# Patient Record
Sex: FEMALE | Race: WHITE | NOT HISPANIC OR LATINO | Employment: UNEMPLOYED | URBAN - METROPOLITAN AREA
[De-identification: names, ages, dates, MRNs, and addresses within clinical notes are randomized per-mention and may not be internally consistent; named-entity substitution may affect disease eponyms.]

---

## 2020-09-12 ENCOUNTER — COMMUNICATION - HEALTHEAST (OUTPATIENT)
Dept: SCHEDULING | Facility: CLINIC | Age: 35
End: 2020-09-12

## 2020-09-15 ENCOUNTER — COMMUNICATION - HEALTHEAST (OUTPATIENT)
Dept: SCHEDULING | Facility: CLINIC | Age: 35
End: 2020-09-15

## 2020-09-24 ENCOUNTER — TRANSCRIBE ORDERS (OUTPATIENT)
Dept: OTHER | Age: 35
End: 2020-09-24

## 2020-09-29 ENCOUNTER — TELEPHONE (OUTPATIENT)
Dept: GASTROENTEROLOGY | Facility: CLINIC | Age: 35
End: 2020-09-29

## 2020-09-29 NOTE — TELEPHONE ENCOUNTER
[facility]     Referring provider:  Yohana Garcai     Referred to: [facility]     Provider Requested:  Russell     Referral Received: 9/29/2020     Records received: in Care Everywhere     Images received: none    Evaluation for: recurrent pancreatitis      Clinical History (per RN review):     Admission [facility] pancreatitis 9/10-9/14/2020 (lipase 1,451 9/10), ER visit 9/16 for abd pain  Fabiola Raymond is a 35 y.o. female with history of alcoholism and previous pancreatitis presented for evaluation of abdominal pain and nausea found to have pancreatitis and elevated LFTs     #Acute alcoholic pancreatitis  Bile ducts looked okay on CT scan and ultrasound.   Treated conservatively with n.p.o. and IV fluid. Symptomatically much better and now tolerating lowfat diet and on oral pain meds.  Advised alcohol cessation     #Probable alcoholic hepatitis   #Fatty liver   Significant transaminitis with conjugated and unconjugated hyperbilirubinemia.  Suspect this is probably alcoholic hepatitis.  As above bile ducts looked okay on imaging.  She does have a fatty liver.  She may be drinking more than she discloses.  Labs have been improving since admission. Needs to stop drinking and I told her this.       Relapsed on alcohol. Hospitalized at [facility] on 6/14/2020- 6/18/2020 due to alcohol induced pancreatitis:  Acute pancreatitis with moderate peripancreatic inflammation and fluid. No   organized fluid collections/pseudocysts, parenchymal necrosis, or other   complication.      CT CAP 9/10/2020    IMPRESSION:     1.  Diffuse peripancreatic inflammatory stranding consistent with acute pancreatitis. No findings to suggest necrotizing pancreatitis or peripancreatic fluid collection.  2.  Severe hepatic steatosis.        MD review date: 9/29/2020  MD Decision for clinic consultation/Orders:            Referral updates/Patient contacted:

## 2020-09-29 NOTE — TELEPHONE ENCOUNTER
[facility] Intake form:    Referring/Requesting provider and Health care System: Yohana Garcia to Dr. Wells in [facility]  Clinic contact - Name, Phone and Fax number:  [phone]  Fax [phone]    Requested provider (if specified):     Has patient been evaluated in clinic or had a procedure [facility] provider in the last 5 years: No      Indication/Diagnosis for consultation: Recurring Pancreatitis    Has patient been evaluated by another Gastroenterologist? Unknown     Imaging completed:     CT scan   No   MRI   No      Procedures:     Upper Endoscopy/EGD unknown     Endoscopic Ultrasound/EUS unknown    ERCP  No    Colonoscopy No      Are images able/being pushed to our system? Yes       Is patient aware of request for clinc consultation and ok to be contacted to schedule? Yes

## 2020-09-29 NOTE — LETTER
October 8, 2020    Fabiola Raymond                              [address]  [place] [address]    Dear Fabiola,      Our office recently received a referral from Yohana Garcia  related to your healthcare. We have reviewed your records and imaging and have suggestions on how we would proceed. We have made several attempts to contact you and have not heard back. Your referral will be closed at this time and we will not make further attempts to contact you. If you wish to follow up on this referral please contact our office at [phone].     Sabra Jones RN Care Coordinator

## 2020-10-06 NOTE — TELEPHONE ENCOUNTER
Per Dr. Wells  Clinic     Called to schedule new patient virtual visit with Russell, left message.    ML

## 2020-10-08 NOTE — TELEPHONE ENCOUNTER
Called to invite patient to clinic with Dr. Wells. Left message, letter sent, referral closed.    ML

## 2020-10-21 ENCOUNTER — COMMUNICATION - HEALTHEAST (OUTPATIENT)
Dept: SCHEDULING | Facility: CLINIC | Age: 35
End: 2020-10-21

## 2021-06-02 ENCOUNTER — RECORDS - HEALTHEAST (OUTPATIENT)
Dept: ADMINISTRATIVE | Facility: CLINIC | Age: 36
End: 2021-06-02

## 2021-06-11 NOTE — TELEPHONE ENCOUNTER
Patient calling reporting she was seen for pancreatitis yesterday. States she is having upper abdominal pain and rates it at 6/10. States the pain is constant. Reports feeling weak also. Pain radiate from the upper abdomen to upper back. Vomited three time. Per guideline, advised patient to be seen at the emergency department. Verbalized understanding and will go [facility].     Charles Nassar RN  [facility] Nurse Advisors     COVID 19 Nurse Triage Plan/Patient Instructions    Please be aware that novel coronavirus (COVID-19) may be circulating in the community. If you develop symptoms such as fever, cough, or SOB or if you have concerns about the presence of another infection including coronavirus (COVID-19), please contact your health care provider or visit [email or web address].     Disposition/Instructions    ED Visit recommended. Follow protocol based instructions.      Bring Your Own Device:  Please also bring your smart device(s) (smart phones, tablets, laptops) and their charging cables for your personal use and to communicate with your care team during your visit.      Thank you for taking steps to prevent the spread of this virus.  o Limit your contact with others.  o Wear a simple mask to cover your cough.  o Wash your hands well and often.    Resources    [facility]: About COVID-19: [email or web address]    CDC: What to Do If You're Sick: www.cdc.gov/coronavirus/2019-ncov/about/steps-when-sick.html    CDC: Ending Home Isolation: www.cdc.gov/coronavirus/2019-ncov/hcp/disposition-in-home-patients.html     CDC: Caring for Someone: www.cdc.gov/coronavirus/2019-ncov/if-you-are-sick/care-for-someone.html     [facility]: Interim Guidance for Hospital Discharge to Home: www.health.[facility].[place]/diseases/coronavirus/hcp/hospdischarge.pdf    [facility] clinical trials (COVID-19 research studies): clinicalaffairs.[facility]/umn-clinical-trials     Below are the COVID-19 hotlines at the  "[place] Department of [facility] ([facility]). Interpreters are available.   o For health questions: Call 890-801-5406 or 1-272.906.2951 (7 a.m. to 7 p.m.)  o For questions about schools and childcare: Call 496-138-8128 or 1-871.482.9942 (7 a.m. to 7 p.m.)       Reason for Disposition    Patient sounds very sick or weak to the triager    Additional Information    Negative: Severe difficulty breathing (e.g., struggling for each breath, speaks in single words)    Negative: Shock suspected (e.g., cold/pale/clammy skin, too weak to stand, low BP, rapid pulse)    Negative: Difficult to awaken or acting confused (e.g., disoriented, slurred speech)    Negative: Passed out (i.e., lost consciousness, collapsed and was not responding)    Negative: Visible sweat on face or sweat dripping down face    Negative: Sounds like a life-threatening emergency to the triager    Negative: [1] SEVERE pain (e.g., excruciating) AND [2] present > 1 hour    Negative: [1] Pain lasts > 10 minutes AND [2] age > 50    Negative: [1] Pain lasts > 10 minutes AND [2] age > 40 AND [3] associated chest, arm, neck, upper back or jaw pain    Negative: [1] Pain lasts > 10 minutes AND [2] age > 35 AND [3] at least one cardiac risk factor (i.e., hypertension, diabetes, obesity, smoker or strong family history of heart disease)    Negative: [1] Pain lasts > 10 minutes AND [2] history of heart disease (i.e., heart attack, bypass surgery, angina, angioplasty, CHF; not just a heart murmur)    Negative: [1] Pain lasts > 10 minutes AND [2] difficulty breathing    Negative: [1] Vomiting AND [2] contains red blood  (Exception: few streaks and only occurred once)    Negative: [1] Vomiting AND [2] contains black (\"coffee ground\") material    Negative: Blood in bowel movements  (Exception: Blood on surface of BM with constipation)    Negative: Black or tarry bowel movements  (Exception: chronic-unchanged  black-grey bowel movements AND is taking iron pills or Pepto-bismol)    " Negative: [1] Pregnant > 24 weeks AND [2] hand or face swelling    Protocols used: ABDOMINAL PAIN - UPPER-A-AH

## 2021-06-16 PROBLEM — K85.20 ALCOHOL-INDUCED ACUTE PANCREATITIS, UNSPECIFIED COMPLICATION STATUS: Status: ACTIVE | Noted: 2020-09-10

## 2021-07-03 ENCOUNTER — HOSPITAL ENCOUNTER (EMERGENCY)
Dept: EMERGENCY MEDICINE | Facility: HOSPITAL | Age: 36
Discharge: HOME | End: 2021-07-03
Attending: EMERGENCY MEDICINE

## 2021-07-03 DIAGNOSIS — K85.90 ACUTE PANCREATITIS, UNSPECIFIED COMPLICATION STATUS, UNSPECIFIED PANCREATITIS TYPE: ICD-10-CM

## 2021-07-03 DIAGNOSIS — G43.909 MIGRAINE WITHOUT STATUS MIGRAINOSUS, NOT INTRACTABLE, UNSPECIFIED MIGRAINE TYPE: ICD-10-CM

## 2021-07-03 DIAGNOSIS — K70.10 ALCOHOLIC HEPATITIS, UNSPECIFIED WHETHER ASCITES PRESENT (H): ICD-10-CM

## 2021-07-03 LAB
ALBUMIN SERPL-MCNC: 4.5 G/DL (ref 3.5–5)
ALBUMIN UR-MCNC: NEGATIVE G/DL
ALP SERPL-CCNC: 78 U/L (ref 45–120)
ALT SERPL W P-5'-P-CCNC: 92 U/L (ref 0–45)
ANION GAP SERPL CALCULATED.3IONS-SCNC: 21 MMOL/L (ref 5–18)
APPEARANCE UR: CLEAR
AST SERPL W P-5'-P-CCNC: 158 U/L (ref 0–40)
BASOPHILS # BLD AUTO: 0 THOU/UL (ref 0–0.2)
BASOPHILS NFR BLD AUTO: 1 % (ref 0–2)
BILIRUB SERPL-MCNC: 0.8 MG/DL (ref 0–1)
BILIRUB UR QL STRIP: NEGATIVE
BUN SERPL-MCNC: 9 MG/DL (ref 8–22)
C REACTIVE PROTEIN LHE: 0.2 MG/DL (ref 0–0.8)
CALCIUM SERPL-MCNC: 8.9 MG/DL (ref 8.5–10.5)
CHLORIDE BLD-SCNC: 99 MMOL/L (ref 98–107)
CO2 SERPL-SCNC: 18 MMOL/L (ref 22–31)
COLOR UR AUTO: ABNORMAL
CREAT SERPL-MCNC: 0.69 MG/DL (ref 0.6–1.1)
EOSINOPHIL # BLD AUTO: 0 THOU/UL (ref 0–0.4)
EOSINOPHIL NFR BLD AUTO: 0 % (ref 0–6)
ERYTHROCYTE [DISTWIDTH] IN BLOOD BY AUTOMATED COUNT: 13.7 % (ref 11–14.5)
GFR SERPL CREATININE-BSD FRML MDRD: >60 ML/MIN/1.73M2
GLUCOSE BLD-MCNC: 68 MG/DL (ref 70–125)
GLUCOSE UR STRIP-MCNC: NEGATIVE MG/DL
HCG SERPL QL: NEGATIVE
HCT VFR BLD AUTO: 40.6 % (ref 35–47)
HGB BLD-MCNC: 14.2 G/DL (ref 12–16)
HGB UR QL STRIP: ABNORMAL
HYALINE CASTS: 4 LPF
IMM GRANULOCYTES # BLD: 0 THOU/UL
IMM GRANULOCYTES NFR BLD: 0 %
KETONES UR STRIP-MCNC: ABNORMAL MG/DL
LEUKOCYTE ESTERASE UR QL STRIP: NEGATIVE
LIPASE SERPL-CCNC: 194 U/L (ref 0–52)
LYMPHOCYTES # BLD AUTO: 1 THOU/UL (ref 0.8–4.4)
LYMPHOCYTES NFR BLD AUTO: 24 % (ref 20–40)
MAGNESIUM SERPL-MCNC: 2 MG/DL (ref 1.8–2.6)
MCH RBC QN AUTO: 33.1 PG (ref 27–34)
MCHC RBC AUTO-ENTMCNC: 35 G/DL (ref 32–36)
MCV RBC AUTO: 95 FL (ref 80–100)
MONOCYTES # BLD AUTO: 0.2 THOU/UL (ref 0–0.9)
MONOCYTES NFR BLD AUTO: 6 % (ref 2–10)
MUCOUS THREADS #/AREA URNS LPF: PRESENT LPF
NEUTROPHILS # BLD AUTO: 2.9 THOU/UL (ref 2–7.7)
NEUTROPHILS NFR BLD AUTO: 70 % (ref 50–70)
NITRATE UR QL: POSITIVE
PH UR STRIP: 5.5 [PH] (ref 5–8)
PLATELET # BLD AUTO: 151 THOU/UL (ref 140–440)
PMV BLD AUTO: 9.5 FL (ref 8.5–12.5)
POTASSIUM BLD-SCNC: 4.4 MMOL/L (ref 3.5–5)
PROT SERPL-MCNC: 8.1 G/DL (ref 6–8)
RBC # BLD AUTO: 4.29 MILL/UL (ref 3.8–5.4)
RBC URINE: 1 HPF
SODIUM SERPL-SCNC: 138 MMOL/L (ref 136–145)
SP GR UR STRIP: 1.01 (ref 1–1.03)
SQUAMOUS EPITHELIAL: 2 /HPF
UROBILINOGEN UR STRIP-ACNC: ABNORMAL
WBC URINE: 2 HPF
WBC: 4.2 THOU/UL (ref 4–11)

## 2021-07-03 ASSESSMENT — MIFFLIN-ST. JEOR: SCORE: 1181.23

## 2021-07-04 NOTE — ED TRIAGE NOTES
ED Triage Notes by Wilfredo Douglas RN at 7/3/2021  4:45 PM     Author: Wilfredo Douglas RN Service: -- Author Type: Registered Nurse    Filed: 7/3/2021  4:48 PM Date of Service: 7/3/2021  4:45 PM Status: Signed    : Wilfredo Douglas RN (Registered Nurse)       She started to have lower abdominal pain and upper gastric pain last night.

## 2021-07-04 NOTE — ED NOTES
ED Notes by Patti Hassan, RN at 7/3/2021  8:43 PM     Author: Patti Hassan RN Service: -- Author Type: Registered Nurse    Filed: 7/3/2021  8:44 PM Date of Service: 7/3/2021  8:43 PM Status: Signed    : Patti Hassan RN (Registered Nurse)       Pt reports medication that was earlier admin did not help much and pain is now 7/10 for headache and stomach provider notified

## 2021-07-06 ENCOUNTER — COMMUNICATION - HEALTHEAST (OUTPATIENT)
Dept: EMERGENCY MEDICINE | Facility: CLINIC | Age: 36
End: 2021-07-06

## 2021-07-06 VITALS — BODY MASS INDEX: 20.2 KG/M2 | WEIGHT: 114 LBS | HEIGHT: 63 IN

## 2021-07-06 LAB — BACTERIA SPEC CULT: ABNORMAL

## 2021-07-06 NOTE — TELEPHONE ENCOUNTER
Telephone Encounter by Vanessa Mota PA-C at 7/6/2021  3:52 PM     Author: Vanessa Mota PA-C Service: -- Author Type: Physician Assistant    Filed: 7/6/2021  3:54 PM Encounter Date: 7/6/2021 Status: Signed    : Vanessa Mota PA-C (Physician Assistant)       Left nonspecific VM regarding test results to discuss. Call back number given

## 2021-07-08 NOTE — ED PROVIDER NOTES
ED Provider Notes by Sergey Berumen MD at 7/3/2021  4:53 PM     Author: Sergey Berumen MD Service: -- Author Type: Physician    Filed: 7/8/2021  6:04 AM Date of Service: 7/3/2021  4:53 PM Status: Signed    : Sergey Berumen MD (Physician)       EMERGENCY DEPARTMENT ENCOUNTER      NAME: Fabiola Barrera  AGE: 36 y.o. female  YOB: 1985  MRN: [id number]  EVALUATION DATE & TIME: 7/3/2021  4:52 PM    PCP: Yohana Garcia CNP    ED PROVIDER: Sergey Berumen M.D.      Chief Complaint   Patient presents with   ? Abdominal Pain         FINAL IMPRESSION:  1. Acute pancreatitis, unspecified complication status, unspecified pancreatitis type    2. Alcoholic hepatitis, unspecified whether ascites present    3. Migraine without status migrainosus, not intractable, unspecified migraine type          ED COURSE & MEDICAL DECISION MAKING:    Pertinent Labs & Imaging studies reviewed. (See chart for details)  36 y.o. female presents to the Emergency Department for evaluation of abdominal pain.  The patient has a history of alcohol abuse and migraine headaches.  She says that she has had some right-sided abdominal pain that is intermittent and stabbing in nature.  She rates her pain 7-10.  Additionally she has had some headache pains as well that she feels in the back of her head and these are accompanied by nausea.  She says that she has tried ibuprofen, Diet Coke and Maxalt for them which typically work but she has not received any relief from them today.  She also says that she has infrequent menstrual periods since having a IUD placed 4 years ago.  but she did notice some bleeding about 2 weeks ago and also last night which would be unusual for her.  On exam she was in mild distress and did have some abdominal tenderness.  The patient's lipase was noted to be elevated and the pancreatitis is very likely explain her abdominal pain.  We did also check a urine but there was no significant sign of  infection in the urine.  Her pregnancy test was negative.  She was noted to have elevated LFTs which she has had before and are likely due to the alcohol use.  She did not have any tenderness over the gallbladder or right upper quadrant.  I do not think that the elevated lipase and elevated transaminases are due to a choledocholithiasis or cholecystitis.  She had a normal white blood cell count, CRP and a normal T bili.  The patient was comfortable after beginning the fluids, antiemetics and pain medications in the ER.  She will be discharged home with prescriptions for the pain medications and antiemetics to use at home.  We did discuss treatment of the pancreatitis at home and she is aware that if she gets worse that she needs to return as it may be necessary to admit her for IV fluids and n.p.o. status until it resolves.  She is aware that she needs to stop drinking alcohol or this may worsen the pancreatitis.    5:31 PM I met the patient and performed my initial interview and exam.   7:03 PM I rechecked and updated the patient.     At the conclusion of the encounter I discussed the results of all of the tests and the disposition. The questions were answered. The patient or family acknowledged understanding and was agreeable with the care plan.       MEDICATIONS GIVEN IN THE EMERGENCY:  Medications   sodium chloride flush 10 mL (NS) (has no administration in time range)   sodium chloride 0.9% 1,000 mL (0 mL Intravenous Stopped 7/3/21 1910)   ketorolac injection 30 mg (TORADOL) (30 mg Intravenous Given 7/3/21 1750)   diphenhydrAMINE injection 25 mg (BENADRYL) (25 mg Intravenous Given 7/3/21 1749)   prochlorperazine injection 10 mg (COMPAZINE) (10 mg Intravenous Given 7/3/21 1755)   HYDROmorphone injection 1 mg (DILAUDID) (1 mg Intravenous Given 7/3/21 2053)       NEW PRESCRIPTIONS STARTED AT TODAY'S ER VISIT  Current Discharge Medication List      START taking these medications    Details   prochlorperazine  (COMPAZINE) 5 MG tablet 1-2 tabs po q8 hours as needed for nausea  Qty: 15 tablet, Refills: 0    Associated Diagnoses: Acute pancreatitis, unspecified complication status, unspecified pancreatitis type; Migraine without status migrainosus, not intractable, unspecified migraine type         CONTINUE these medications which have CHANGED    Details   HYDROmorphone (DILAUDID) 2 MG tablet Take 1 tablet (2 mg total) by mouth every 4 (four) hours as needed for pain.  Qty: 10 tablet, Refills: 0    Associated Diagnoses: Acute pancreatitis, unspecified complication status, unspecified pancreatitis type; Migraine without status migrainosus, not intractable, unspecified migraine type         CONTINUE these medications which have NOT CHANGED    Details   acetaminophen-codeine (TYLENOL #3) 300-30 mg per tablet Take 1-2 tablets by mouth.      docusate sodium (COLACE) 100 MG capsule Take 1 capsule (100 mg total) by mouth 2 (two) times a day as needed for constipation.  Qty: 60 capsule, Refills: 0    Associated Diagnoses: Alcohol-induced acute pancreatitis, unspecified complication status      famotidine (PEPCID) 20 MG tablet Take 1 tablet (20 mg total) by mouth 2 (two) times a day as needed for heartburn.  Qty: 30 tablet, Refills: 0    Associated Diagnoses: Gastroesophageal reflux disease, esophagitis presence not specified      ondansetron (ZOFRAN-ODT) 4 MG disintegrating tablet Take 1 tablet (4 mg total) by mouth every 6 (six) hours as needed for nausea.  Qty: 12 tablet, Refills: 0    Associated Diagnoses: Alcohol-induced acute pancreatitis, unspecified complication status      polyethylene glycol (MIRALAX) 17 gram packet Take 1 packet (17 g total) by mouth daily as needed (constipation).  Qty: 14 each, Refills: 0    Associated Diagnoses: Generalized abdominal pain      promethazine (PHENERGAN) 25 MG suppository Insert 1 suppository (25 mg total) into the rectum every 6 (six) hours as needed for nausea.  Qty: 12 suppository,  Refills: 0    Associated Diagnoses: Generalized abdominal pain      propranoloL (INDERAL LA) 60 mg 24 hr capsule Take 60 mg by mouth daily.       rizatriptan (MAXALT-MLT) 10 MG disintegrating tablet Take 10 mg by mouth as needed for migraine (May repeat in 2 hr if needed up to 30mg in 24 hr & MAX use 5 days/month).       sertraline (ZOLOFT) 50 MG tablet Take 1 tablet (50 mg total) by mouth daily.  Qty: 30 tablet, Refills: 0    Associated Diagnoses: Depression, unspecified depression type                =================================================================    HPI    Patient information was obtained from: Patient    Use of Intrepreter: N/A       Fabiola Barrera is a 36 y.o. female with a pertinent history of nausea, pharyngitis, pancreatitis, lymphadenopathy,and alcohol abuse who presents to this ED by walk in for evaluation of abdominal pain.     Patient reports that she has had right sided abdominal pain which comes and goes as a stabbing pain rating a 7/10 and has a migraine which locates in the back of her head and radiates to her sides and some nausea. Patient notes she gets migraines and takes ibuprofen, diet coke, and Maxalt for them, but today has no relief from this. She notes that she had some menstrual bleeding two weeks ago and had some last night which is abnormal for her as she says after she got her IUD in place over 4 years ago she normally only has one or two menstrual cycles a year. She says she has been under a lot of stress lately as well and she feels exhausted. Her migraine gets worse with light. Patient has had two  sections in the past. Patient mentioned having some heart burn the last couple days but this is now resolved. Patient denies any chills, fever, urinary problems, vaginal discharge, dizziness, or any other complaints at this time.       REVIEW OF SYSTEMS   Constitutional:  Positive for migraine Denies fever, chills, weight loss or weakness, no loss of  appetite  Eyes:  No pain, diplopia, or vision loss  HENT:  Denies sore throat or ear pain.   Respiratory: No SOB, wheeze or cough  Cardiovascular:  No CP, MANLEY, palpitations, syncope  GI: Positive for abdominal pain and nausea Denies vomiting, or dark, bloody stools. No diarrhea    All other systems negative unless noted in HPI    PAST MEDICAL HISTORY:  Past Medical History:   Diagnosis Date   ? Depression    ? Migraine    ? Pancreatitis        PAST SURGICAL HISTORY:  Past Surgical History:   Procedure Laterality Date   ?  SECTION      x2           CURRENT MEDICATIONS:    No current facility-administered medications on file prior to encounter.      Current Outpatient Medications on File Prior to Encounter   Medication Sig   ? acetaminophen-codeine (TYLENOL #3) 300-30 mg per tablet Take 1-2 tablets by mouth.   ? docusate sodium (COLACE) 100 MG capsule Take 1 capsule (100 mg total) by mouth 2 (two) times a day as needed for constipation.   ? famotidine (PEPCID) 20 MG tablet Take 1 tablet (20 mg total) by mouth 2 (two) times a day as needed for heartburn.   ? ondansetron (ZOFRAN-ODT) 4 MG disintegrating tablet Take 1 tablet (4 mg total) by mouth every 6 (six) hours as needed for nausea.   ? polyethylene glycol (MIRALAX) 17 gram packet Take 1 packet (17 g total) by mouth daily as needed (constipation).   ? promethazine (PHENERGAN) 25 MG suppository Insert 1 suppository (25 mg total) into the rectum every 6 (six) hours as needed for nausea.   ? propranoloL (INDERAL LA) 60 mg 24 hr capsule Take 60 mg by mouth daily.    ? rizatriptan (MAXALT-MLT) 10 MG disintegrating tablet Take 10 mg by mouth as needed for migraine (May repeat in 2 hr if needed up to 30mg in 24 hr & MAX use 5 days/month).    ? sertraline (ZOLOFT) 50 MG tablet Take 1 tablet (50 mg total) by mouth daily.   ? [DISCONTINUED] HYDROmorphone (DILAUDID) 2 MG tablet Take 1-2 tablets (2-4 mg total) by mouth every 6 (six) hours as needed for pain.        ALLERGIES:  Allergies   Allergen Reactions   ? Oxycodone-Acetaminophen Rash       FAMILY HISTORY:  Family History   Problem Relation Age of Onset   ? Nephrolithiasis Maternal Grandfather        SOCIAL HISTORY:   Social History     Socioeconomic History   ? Marital status:      Spouse name: None   ? Number of children: None   ? Years of education: None   ? Highest education level: None   Occupational History   ? None   Social Needs   ? Financial resource strain: None   ? Food insecurity     Worry: None     Inability: None   ? Transportation needs     Medical: None     Non-medical: None   Tobacco Use   ? Smoking status: Current Some Day Smoker   ? Smokeless tobacco: Never Used   ? Tobacco comment: 1 cigarette every other week   Substance and Sexual Activity   ? Alcohol use: Yes     Alcohol/week: 0.0 - 10.0 standard drinks     Frequency: 2-3 times a week     Binge frequency: Weekly     Comment: varies from none to several drinks a day, drinks bi weekly   ? Drug use: Never   ? Sexual activity: None   Lifestyle   ? Physical activity     Days per week: None     Minutes per session: None   ? Stress: None   Relationships   ? Social connections     Talks on phone: None     Gets together: None     Attends Restorationism service: None     Active member of club or organization: None     Attends meetings of clubs or organizations: None     Relationship status: None   ? Intimate partner violence     Fear of current or ex partner: None     Emotionally abused: None     Physically abused: None     Forced sexual activity: None   Other Topics Concern   ? None   Social History Narrative   ? None       VITALS:  Patient Vitals for the past 24 hrs:   BP Temp Temp src Pulse Resp SpO2 Height Weight   07/03/21 2100 112/73 -- -- -- -- -- -- --   07/03/21 2059 -- -- -- 88 -- 95 % -- --   07/03/21 2039 118/68 -- -- 91 -- 97 % -- --   07/03/21 1900 110/73 -- -- 83 -- 97 % -- --   07/03/21 1830 116/75 -- -- 75 -- 98 % -- --   07/03/21  "1800 113/74 -- -- 91 -- 99 % -- --   07/03/21 1755 126/86 -- -- 80 -- 99 % -- --   07/03/21 1649 (!) 135/96 97.9  F (36.6  C) Oral 96 14 100 % 5' 3\" (1.6 m) 114 lb (51.7 kg)       PHYSICAL EXAM    General Appearance: Alert, cooperative,  appears stated age   Head:  Normocephalic, without obvious abnormality, atraumatic  Eyes:  PERRL, conjunctiva/corneas clear  ENT:  Lips, mucosa, and tongue normal; teeth and gums normal  Neck:  Supple, symmetrical, trachea midline, no adenopathy; no carotid  bruit or JVD  Chest:  No tenderness or deformity  Cardio: Regular rate and rhythm without murmur, full symmetric peripheral pulses  Pulm:  Clear to auscultation bilaterally, respirations unlabored, no wheezes, rales or rhonchi.  Back:  Symmetric, no curvature, ROM normal, no CVA tenderness  Abdomen:  Soft, bowel sounds active all four quadrants, no masses, no organomegaly, tenderness to palpation over Mcburney's point, no guarding, not worse with percussions  Extremities:  Extremities normal, atraumatic, no cyanosis or edema  Skin:  Skin color, texture, turgor normal, no rashes or lesions  Lymph:  Cervical, supraclavicular, and axillary nodes normal  Neuro:  AOx3, CNs grossly intact, motor and sensory intact throughout the extremities.  Bedside cerebellar testing normal.     LAB:  All pertinent labs reviewed and interpreted.  Results for orders placed or performed during the hospital encounter of 07/03/21   Urinalysis-UC if Indicated   Result Value Ref Range    Color, UA Light Yellow Light Yellow, Yellow    Clarity, UA Clear Clear    Glucose, UA Negative Negative    Protein, UA Negative Negative    Bilirubin, UA Negative Negative    Urobilinogen, UA <2.0 mg/dL <2.0 mg/dL    pH, UA 5.5 5.0 - 8.0    Blood, UA 0.2 mg/dL (!) Negative    Ketones, UA 20 mg/dL Negative    Nitrite, UA Positive (!) Negative    Leukocytes, UA Negative Negative    Specific Gravity, UA 1.009 1.001 - 1.030    RBC, UA 1 <=2 hpf    WBC UA 2 <=5 hpf    Squamous " Epithel, UA 2 <=5 /HPF    Mucus, UA Present (!) None Seen lpf    Hyaline Casts, UA 4 <=5 lpf   Beta-hCG, Qualitative, Serum   Result Value Ref Range    Beta hCG Qualitative Negative Negative   C-Reactive Protein   Result Value Ref Range    CRP 0.2 0.0 - 0.8 mg/dL   Comprehensive Metabolic Panel   Result Value Ref Range    Sodium 138 136 - 145 mmol/L    Potassium 4.4 3.5 - 5.0 mmol/L    Chloride 99 98 - 107 mmol/L    CO2 18 (L) 22 - 31 mmol/L    Anion Gap, Calculation 21 (H) 5 - 18 mmol/L    Glucose 68 (L) 70 - 125 mg/dL    BUN 9 8 - 22 mg/dL    Creatinine 0.69 0.60 - 1.10 mg/dL    GFR MDRD Af Amer >60 >60 mL/min/1.73m2    GFR MDRD Non Af Amer >60 >60 mL/min/1.73m2    Bilirubin, Total 0.8 0.0 - 1.0 mg/dL    Calcium 8.9 8.5 - 10.5 mg/dL    Protein, Total 8.1 (H) 6.0 - 8.0 g/dL    Albumin 4.5 3.5 - 5.0 g/dL    Alkaline Phosphatase 78 45 - 120 U/L     (H) 0 - 40 U/L    ALT 92 (H) 0 - 45 U/L   Lipase   Result Value Ref Range    Lipase 194 (H) 0 - 52 U/L   Magnesium   Result Value Ref Range    Magnesium 2.0 1.8 - 2.6 mg/dL   HM1 (CBC with Diff)   Result Value Ref Range    WBC 4.2 4.0 - 11.0 thou/uL    RBC 4.29 3.80 - 5.40 mill/uL    Hemoglobin 14.2 12.0 - 16.0 g/dL    Hematocrit 40.6 35.0 - 47.0 %    MCV 95 80 - 100 fL    MCH 33.1 27.0 - 34.0 pg    MCHC 35.0 32.0 - 36.0 g/dL    RDW 13.7 11.0 - 14.5 %    Platelets 151 140 - 440 thou/uL    MPV 9.5 8.5 - 12.5 fL    Neutrophils % 70 50 - 70 %    Lymphocytes % 24 20 - 40 %    Monocytes % 6 2 - 10 %    Eosinophils % 0 0 - 6 %    Basophils % 1 0 - 2 %    Immature Granulocyte % 0 <=0 %    Neutrophils Absolute 2.9 2.0 - 7.7 thou/uL    Lymphocytes Absolute 1.0 0.8 - 4.4 thou/uL    Monocytes Absolute 0.2 0.0 - 0.9 thou/uL    Eosinophils Absolute 0.0 0.0 - 0.4 thou/uL    Basophils Absolute 0.0 0.0 - 0.2 thou/uL    Immature Granulocyte Absolute 0.0 <=0.0 thou/uL       RADIOLOGY:  Reviewed all pertinent imaging. Please see official radiology report.  Us Pelvis With Transvaginal  Non Ob    Result Date: 7/3/2021  EXAM: US PELVIS WITH TRANSVAGINAL NON OB LOCATION: [facility] DATE/TIME: 7/3/2021 7:41 PM INDICATION: right lower abd/pelvic pain COMPARISON: None. TECHNIQUE: Transabdominal scans were performed. Endovaginal ultrasound was performed to better visualize the adnexa. FINDINGS: UTERUS: 9.4 x 3.6 x 4.8 cm. Normal in size and position with no masses. An IUD is positioned centrally in the uterine fundus. ENDOMETRIUM: 4 mm. Normal smooth endometrium. RIGHT OVARY: 2.4 x 2.1 x 2.9 cm. Normal with flow demonstrated. LEFT OVARY: 3.6 x 1.7 x 3.1 cm. Normal with flow demonstrated. No significant free fluid.     1.  Normal pelvic ultrasound.       EKG:        I have independently reviewed and interpreted the EKG(s) documented above.      I, Richar Dunne, am serving as a scribe to document services personally performed by Dr. Berumen based on my observation and the provider's statements to me. I,  Sergey Berumen MD attest that Richar Dunne is acting in a scribe capacity, has observed my performance of the services and has documented them in accordance with my direction.    Sergey Berumen M.D.  Emergency Medicine  [facility] EMERGENCY DEPARTMENT  [address]  [facility] [address]  Dept: [phone]  Loc: [phone]       Sergey Berumen MD  07/08/21 0604

## 2021-07-18 ENCOUNTER — HOSPITAL ENCOUNTER (INPATIENT)
Facility: HOSPITAL | Age: 36
LOS: 5 days | Discharge: HOME | End: 2021-07-24
Attending: EMERGENCY MEDICINE | Admitting: HOSPITALIST
Payer: COMMERCIAL

## 2021-07-18 DIAGNOSIS — F10.929 ALCOHOLIC INTOXICATION WITH COMPLICATION (H): ICD-10-CM

## 2021-07-18 DIAGNOSIS — K70.10 ALCOHOLIC HEPATITIS WITHOUT ASCITES (H): Primary | ICD-10-CM

## 2021-07-18 DIAGNOSIS — R10.13 ABDOMINAL PAIN, EPIGASTRIC: ICD-10-CM

## 2021-07-18 DIAGNOSIS — K85.90 ACUTE PANCREATITIS, UNSPECIFIED COMPLICATION STATUS, UNSPECIFIED PANCREATITIS TYPE: ICD-10-CM

## 2021-07-18 DIAGNOSIS — R03.0 ELEVATED BLOOD PRESSURE READING WITHOUT DIAGNOSIS OF HYPERTENSION: ICD-10-CM

## 2021-07-18 DIAGNOSIS — K59.00 CONSTIPATION, UNSPECIFIED CONSTIPATION TYPE: ICD-10-CM

## 2021-07-18 DIAGNOSIS — N30.00 ACUTE CYSTITIS WITHOUT HEMATURIA: ICD-10-CM

## 2021-07-18 DIAGNOSIS — T36.95XA ANTIBIOTIC-INDUCED COLITIS: ICD-10-CM

## 2021-07-18 DIAGNOSIS — F41.1 GENERALIZED ANXIETY DISORDER: ICD-10-CM

## 2021-07-18 DIAGNOSIS — F32.A DEPRESSION, UNSPECIFIED DEPRESSION TYPE: ICD-10-CM

## 2021-07-18 DIAGNOSIS — K52.1 ANTIBIOTIC-INDUCED COLITIS: ICD-10-CM

## 2021-07-18 LAB
ALBUMIN SERPL-MCNC: 3.9 G/DL (ref 3.5–5)
ALBUMIN UR-MCNC: NEGATIVE MG/DL
ALP SERPL-CCNC: 99 U/L (ref 45–120)
ALT SERPL W P-5'-P-CCNC: 147 U/L (ref 0–45)
ANION GAP SERPL CALCULATED.3IONS-SCNC: 13 MMOL/L (ref 5–18)
APPEARANCE UR: ABNORMAL
AST SERPL W P-5'-P-CCNC: 262 U/L (ref 0–40)
BACTERIA #/AREA URNS HPF: ABNORMAL /HPF
BILIRUB DIRECT SERPL-MCNC: 0.3 MG/DL
BILIRUB SERPL-MCNC: 0.8 MG/DL (ref 0–1)
BILIRUB UR QL STRIP: NEGATIVE
BUN SERPL-MCNC: 7 MG/DL (ref 8–22)
CALCIUM SERPL-MCNC: 9.3 MG/DL (ref 8.5–10.5)
CHLORIDE BLD-SCNC: 103 MMOL/L (ref 98–107)
CO2 SERPL-SCNC: 23 MMOL/L (ref 22–31)
COLOR UR AUTO: YELLOW
CREAT SERPL-MCNC: 0.62 MG/DL (ref 0.6–1.1)
ERYTHROCYTE [DISTWIDTH] IN BLOOD BY AUTOMATED COUNT: 15.1 % (ref 10–15)
ETHANOL SERPL-MCNC: 125 MG/DL
GFR SERPL CREATININE-BSD FRML MDRD: >90 ML/MIN/1.73M2
GLUCOSE BLD-MCNC: 76 MG/DL (ref 70–125)
GLUCOSE UR STRIP-MCNC: NEGATIVE MG/DL
HCG UR QL: NEGATIVE
HCT VFR BLD AUTO: 37.3 % (ref 35–47)
HGB BLD-MCNC: 12.9 G/DL (ref 11.7–15.7)
HGB UR QL STRIP: NEGATIVE
INTERNAL QC OK POCT: NORMAL
KETONES UR STRIP-MCNC: NEGATIVE MG/DL
LEUKOCYTE ESTERASE UR QL STRIP: ABNORMAL
LIPASE SERPL-CCNC: 292 U/L (ref 0–52)
MCH RBC QN AUTO: 33.9 PG (ref 26.5–33)
MCHC RBC AUTO-ENTMCNC: 34.6 G/DL (ref 31.5–36.5)
MCV RBC AUTO: 98 FL (ref 78–100)
MUCOUS THREADS #/AREA URNS LPF: PRESENT /LPF
NITRATE UR QL: NEGATIVE
PH UR STRIP: 6.5 [PH] (ref 5–7)
PLATELET # BLD AUTO: 91 10E3/UL (ref 150–450)
POTASSIUM BLD-SCNC: 4.1 MMOL/L (ref 3.5–5)
PROT SERPL-MCNC: 7.2 G/DL (ref 6–8)
RBC # BLD AUTO: 3.81 10E6/UL (ref 3.8–5.2)
RBC URINE: 1 /HPF
SODIUM SERPL-SCNC: 139 MMOL/L (ref 136–145)
SP GR UR STRIP: 1.01 (ref 1–1.03)
SQUAMOUS EPITHELIAL: 5 /HPF
UROBILINOGEN UR STRIP-MCNC: <2 MG/DL
WBC # BLD AUTO: 3.5 10E3/UL (ref 4–11)
WBC URINE: 15 /HPF

## 2021-07-18 PROCEDURE — 80053 COMPREHEN METABOLIC PANEL: CPT | Performed by: EMERGENCY MEDICINE

## 2021-07-18 PROCEDURE — 81025 URINE PREGNANCY TEST: CPT | Performed by: EMERGENCY MEDICINE

## 2021-07-18 PROCEDURE — 85027 COMPLETE CBC AUTOMATED: CPT | Performed by: EMERGENCY MEDICINE

## 2021-07-18 PROCEDURE — 258N000003 HC RX IP 258 OP 636: Performed by: EMERGENCY MEDICINE

## 2021-07-18 PROCEDURE — 250N000011 HC RX IP 250 OP 636: Performed by: EMERGENCY MEDICINE

## 2021-07-18 PROCEDURE — 36415 COLL VENOUS BLD VENIPUNCTURE: CPT | Performed by: EMERGENCY MEDICINE

## 2021-07-18 PROCEDURE — 81001 URINALYSIS AUTO W/SCOPE: CPT | Performed by: EMERGENCY MEDICINE

## 2021-07-18 PROCEDURE — 87086 URINE CULTURE/COLONY COUNT: CPT | Performed by: EMERGENCY MEDICINE

## 2021-07-18 PROCEDURE — 96374 THER/PROPH/DIAG INJ IV PUSH: CPT

## 2021-07-18 PROCEDURE — 99285 EMERGENCY DEPT VISIT HI MDM: CPT | Mod: 25

## 2021-07-18 PROCEDURE — 82077 ASSAY SPEC XCP UR&BREATH IA: CPT | Performed by: EMERGENCY MEDICINE

## 2021-07-18 PROCEDURE — 96375 TX/PRO/DX INJ NEW DRUG ADDON: CPT

## 2021-07-18 PROCEDURE — 83690 ASSAY OF LIPASE: CPT | Performed by: EMERGENCY MEDICINE

## 2021-07-18 PROCEDURE — 84100 ASSAY OF PHOSPHORUS: CPT | Performed by: HOSPITALIST

## 2021-07-18 PROCEDURE — 36592 COLLECT BLOOD FROM PICC: CPT | Performed by: EMERGENCY MEDICINE

## 2021-07-18 PROCEDURE — 250N000013 HC RX MED GY IP 250 OP 250 PS 637: Performed by: EMERGENCY MEDICINE

## 2021-07-18 PROCEDURE — 96361 HYDRATE IV INFUSION ADD-ON: CPT

## 2021-07-18 PROCEDURE — 83735 ASSAY OF MAGNESIUM: CPT | Performed by: HOSPITALIST

## 2021-07-18 PROCEDURE — HZ2ZZZZ DETOXIFICATION SERVICES FOR SUBSTANCE ABUSE TREATMENT: ICD-10-PCS | Performed by: FAMILY MEDICINE

## 2021-07-18 PROCEDURE — C9803 HOPD COVID-19 SPEC COLLECT: HCPCS

## 2021-07-18 RX ORDER — METOCLOPRAMIDE HYDROCHLORIDE 5 MG/ML
10 INJECTION INTRAMUSCULAR; INTRAVENOUS ONCE
Status: COMPLETED | OUTPATIENT
Start: 2021-07-18 | End: 2021-07-18

## 2021-07-18 RX ORDER — DIPHENHYDRAMINE HYDROCHLORIDE 50 MG/ML
25 INJECTION INTRAMUSCULAR; INTRAVENOUS ONCE
Status: COMPLETED | OUTPATIENT
Start: 2021-07-18 | End: 2021-07-18

## 2021-07-18 RX ADMIN — HYDROMORPHONE HYDROCHLORIDE 1 MG: 1 INJECTION, SOLUTION INTRAMUSCULAR; INTRAVENOUS; SUBCUTANEOUS at 23:01

## 2021-07-18 RX ADMIN — DIPHENHYDRAMINE HYDROCHLORIDE 25 MG: 50 INJECTION, SOLUTION INTRAMUSCULAR; INTRAVENOUS at 21:58

## 2021-07-18 RX ADMIN — METOCLOPRAMIDE HYDROCHLORIDE 10 MG: 5 INJECTION INTRAMUSCULAR; INTRAVENOUS at 21:58

## 2021-07-18 RX ADMIN — SODIUM CHLORIDE, POTASSIUM CHLORIDE, SODIUM LACTATE AND CALCIUM CHLORIDE 1000 ML: 600; 310; 30; 20 INJECTION, SOLUTION INTRAVENOUS at 21:57

## 2021-07-18 RX ADMIN — CEPHALEXIN 250 MG: 250 CAPSULE ORAL at 23:01

## 2021-07-18 NOTE — LETTER
[place] [address]  [address] [facility] [address]  Phone: [phone]  Fax: [phone]    July 24, 2021        Fabiola GONZÁLES Segundo  [address] [place] [id number]          To whom it may concern:    RE: Fabiola Loyaur    Patient can work from home 7/26/21-7/28/21. Thanks!    Please contact me for questions or concerns.      Sincerely,        Kevin Arthur MD

## 2021-07-18 NOTE — LETTER
[place] [address]  [address] [facility] [address]  Phone: [phone]  Fax: [phone]    July 24, 2021        Fabiola Raymond  [address] [place] [address]          To whom it may concern:    RE: Fabiola Raymond    {WORK NOTE CHOICES:740310}    Please contact me for questions or concerns.      Sincerely,        No name on file.

## 2021-07-18 NOTE — LETTER
[place] [address]  [address] [facility] [address]  Phone: [phone]  Fax: [phone]    July 24, 2021        Fabiola Raymond  [address] [place] [address]                    To whom it may concern:     RE: Fabiola Raymond     Patient can work from home 7/26/21-7/28/21. Thanks!     Please contact me for questions or concerns.        Sincerely,           Kevin Arthur MD    Please contact me for questions or concerns.

## 2021-07-19 ENCOUNTER — HOSPITAL ENCOUNTER (OUTPATIENT)
Dept: ULTRASOUND IMAGING | Facility: HOSPITAL | Age: 36
Setting detail: OBSERVATION
End: 2021-07-19
Attending: HOSPITALIST
Payer: COMMERCIAL

## 2021-07-19 LAB
ALBUMIN SERPL-MCNC: 3.5 G/DL (ref 3.5–5)
ALP SERPL-CCNC: 90 U/L (ref 45–120)
ALT SERPL W P-5'-P-CCNC: 122 U/L (ref 0–45)
ANION GAP SERPL CALCULATED.3IONS-SCNC: 12 MMOL/L (ref 5–18)
AST SERPL W P-5'-P-CCNC: 183 U/L (ref 0–40)
BILIRUB SERPL-MCNC: 0.9 MG/DL (ref 0–1)
BUN SERPL-MCNC: 9 MG/DL (ref 8–22)
CALCIUM SERPL-MCNC: 8.6 MG/DL (ref 8.5–10.5)
CHLORIDE BLD-SCNC: 102 MMOL/L (ref 98–107)
CO2 SERPL-SCNC: 23 MMOL/L (ref 22–31)
CREAT SERPL-MCNC: 0.67 MG/DL (ref 0.6–1.1)
ERYTHROCYTE [DISTWIDTH] IN BLOOD BY AUTOMATED COUNT: 14.9 % (ref 10–15)
GFR SERPL CREATININE-BSD FRML MDRD: >90 ML/MIN/1.73M2
GLUCOSE BLD-MCNC: 56 MG/DL (ref 70–125)
GLUCOSE BLDC GLUCOMTR-MCNC: 122 MG/DL (ref 70–125)
GLUCOSE BLDC GLUCOMTR-MCNC: 135 MG/DL (ref 70–125)
GLUCOSE BLDC GLUCOMTR-MCNC: 171 MG/DL (ref 70–125)
GLUCOSE BLDC GLUCOMTR-MCNC: 56 MG/DL (ref 70–125)
GLUCOSE BLDC GLUCOMTR-MCNC: 59 MG/DL (ref 70–125)
HCT VFR BLD AUTO: 32.9 % (ref 35–47)
HGB BLD-MCNC: 11 G/DL (ref 11.7–15.7)
INR PPP: 1.15 (ref 0.85–1.15)
LIPASE SERPL-CCNC: 340 U/L (ref 0–52)
MAGNESIUM SERPL-MCNC: 1.7 MG/DL (ref 1.8–2.6)
MAGNESIUM SERPL-MCNC: 1.8 MG/DL (ref 1.8–2.6)
MCH RBC QN AUTO: 33.5 PG (ref 26.5–33)
MCHC RBC AUTO-ENTMCNC: 33.4 G/DL (ref 31.5–36.5)
MCV RBC AUTO: 100 FL (ref 78–100)
PHOSPHATE SERPL-MCNC: 2.8 MG/DL (ref 2.5–4.5)
PLATELET # BLD AUTO: 75 10E3/UL (ref 150–450)
POTASSIUM BLD-SCNC: 4.8 MMOL/L (ref 3.5–5)
PROT SERPL-MCNC: 6.2 G/DL (ref 6–8)
RBC # BLD AUTO: 3.28 10E6/UL (ref 3.8–5.2)
SARS-COV-2 RNA RESP QL NAA+PROBE: NEGATIVE
SODIUM SERPL-SCNC: 137 MMOL/L (ref 136–145)
WBC # BLD AUTO: 4 10E3/UL (ref 4–11)

## 2021-07-19 PROCEDURE — 250N000011 HC RX IP 250 OP 636: Performed by: INTERNAL MEDICINE

## 2021-07-19 PROCEDURE — 83735 ASSAY OF MAGNESIUM: CPT | Performed by: INTERNAL MEDICINE

## 2021-07-19 PROCEDURE — 250N000013 HC RX MED GY IP 250 OP 250 PS 637: Performed by: INTERNAL MEDICINE

## 2021-07-19 PROCEDURE — 36415 COLL VENOUS BLD VENIPUNCTURE: CPT | Performed by: HOSPITALIST

## 2021-07-19 PROCEDURE — 96375 TX/PRO/DX INJ NEW DRUG ADDON: CPT

## 2021-07-19 PROCEDURE — 80053 COMPREHEN METABOLIC PANEL: CPT | Performed by: HOSPITALIST

## 2021-07-19 PROCEDURE — 250N000011 HC RX IP 250 OP 636: Performed by: HOSPITALIST

## 2021-07-19 PROCEDURE — C9803 HOPD COVID-19 SPEC COLLECT: HCPCS

## 2021-07-19 PROCEDURE — 250N000011 HC RX IP 250 OP 636: Performed by: EMERGENCY MEDICINE

## 2021-07-19 PROCEDURE — 96376 TX/PRO/DX INJ SAME DRUG ADON: CPT

## 2021-07-19 PROCEDURE — 258N000003 HC RX IP 258 OP 636: Performed by: INTERNAL MEDICINE

## 2021-07-19 PROCEDURE — 99222 1ST HOSP IP/OBS MODERATE 55: CPT | Performed by: HOSPITALIST

## 2021-07-19 PROCEDURE — 87635 SARS-COV-2 COVID-19 AMP PRB: CPT | Performed by: EMERGENCY MEDICINE

## 2021-07-19 PROCEDURE — 85014 HEMATOCRIT: CPT | Performed by: HOSPITALIST

## 2021-07-19 PROCEDURE — 76705 ECHO EXAM OF ABDOMEN: CPT

## 2021-07-19 PROCEDURE — 85610 PROTHROMBIN TIME: CPT | Performed by: HOSPITALIST

## 2021-07-19 PROCEDURE — 99207 PR NO CHARGE LOS: CPT | Performed by: INTERNAL MEDICINE

## 2021-07-19 PROCEDURE — 258N000003 HC RX IP 258 OP 636: Performed by: HOSPITALIST

## 2021-07-19 PROCEDURE — G0378 HOSPITAL OBSERVATION PER HR: HCPCS

## 2021-07-19 PROCEDURE — 258N000001 HC RX 258: Performed by: INTERNAL MEDICINE

## 2021-07-19 PROCEDURE — 250N000013 HC RX MED GY IP 250 OP 250 PS 637: Performed by: HOSPITALIST

## 2021-07-19 PROCEDURE — 120N000001 HC R&B MED SURG/OB

## 2021-07-19 PROCEDURE — 83690 ASSAY OF LIPASE: CPT | Performed by: HOSPITALIST

## 2021-07-19 PROCEDURE — 36415 COLL VENOUS BLD VENIPUNCTURE: CPT | Performed by: INTERNAL MEDICINE

## 2021-07-19 RX ORDER — OXYCODONE HYDROCHLORIDE 5 MG/1
5-10 TABLET ORAL EVERY 4 HOURS PRN
Status: DISCONTINUED | OUTPATIENT
Start: 2021-07-19 | End: 2021-07-22

## 2021-07-19 RX ORDER — CALCIUM CARBONATE 500 MG/1
500 TABLET, CHEWABLE ORAL DAILY PRN
Status: DISCONTINUED | OUTPATIENT
Start: 2021-07-19 | End: 2021-07-24 | Stop reason: HOSPADM

## 2021-07-19 RX ORDER — LORAZEPAM 2 MG/ML
1-2 INJECTION INTRAMUSCULAR EVERY 30 MIN PRN
Status: DISCONTINUED | OUTPATIENT
Start: 2021-07-19 | End: 2021-07-22

## 2021-07-19 RX ORDER — MULTIPLE VITAMINS W/ MINERALS TAB 9MG-400MCG
1 TAB ORAL DAILY
Status: DISCONTINUED | OUTPATIENT
Start: 2021-07-19 | End: 2021-07-24 | Stop reason: HOSPADM

## 2021-07-19 RX ORDER — NALOXONE HYDROCHLORIDE 0.4 MG/ML
0.2 INJECTION, SOLUTION INTRAMUSCULAR; INTRAVENOUS; SUBCUTANEOUS
Status: DISCONTINUED | OUTPATIENT
Start: 2021-07-19 | End: 2021-07-24 | Stop reason: HOSPADM

## 2021-07-19 RX ORDER — NALOXONE HYDROCHLORIDE 0.4 MG/ML
0.4 INJECTION, SOLUTION INTRAMUSCULAR; INTRAVENOUS; SUBCUTANEOUS
Status: DISCONTINUED | OUTPATIENT
Start: 2021-07-19 | End: 2021-07-24 | Stop reason: HOSPADM

## 2021-07-19 RX ORDER — HYDROMORPHONE HYDROCHLORIDE 1 MG/ML
.5-1 INJECTION, SOLUTION INTRAMUSCULAR; INTRAVENOUS; SUBCUTANEOUS
Status: DISCONTINUED | OUTPATIENT
Start: 2021-07-19 | End: 2021-07-22

## 2021-07-19 RX ORDER — ONDANSETRON 4 MG/1
4 TABLET, ORALLY DISINTEGRATING ORAL EVERY 6 HOURS PRN
Status: DISCONTINUED | OUTPATIENT
Start: 2021-07-19 | End: 2021-07-24 | Stop reason: HOSPADM

## 2021-07-19 RX ORDER — ONDANSETRON 2 MG/ML
4-8 INJECTION INTRAMUSCULAR; INTRAVENOUS EVERY 6 HOURS PRN
Status: DISCONTINUED | OUTPATIENT
Start: 2021-07-19 | End: 2021-07-24 | Stop reason: HOSPADM

## 2021-07-19 RX ORDER — FOLIC ACID 1 MG/1
1 TABLET ORAL DAILY
Status: DISCONTINUED | OUTPATIENT
Start: 2021-07-19 | End: 2021-07-24 | Stop reason: HOSPADM

## 2021-07-19 RX ORDER — HALOPERIDOL 5 MG/ML
2.5-5 INJECTION INTRAMUSCULAR EVERY 6 HOURS PRN
Status: DISCONTINUED | OUTPATIENT
Start: 2021-07-19 | End: 2021-07-24 | Stop reason: HOSPADM

## 2021-07-19 RX ORDER — HYDROMORPHONE HYDROCHLORIDE 1 MG/ML
0.5 INJECTION, SOLUTION INTRAMUSCULAR; INTRAVENOUS; SUBCUTANEOUS
Status: DISCONTINUED | OUTPATIENT
Start: 2021-07-19 | End: 2021-07-19 | Stop reason: ALTCHOICE

## 2021-07-19 RX ORDER — RIZATRIPTAN BENZOATE 10 MG/1
10 TABLET, ORALLY DISINTEGRATING ORAL
COMMUNITY

## 2021-07-19 RX ORDER — ONDANSETRON 2 MG/ML
4 INJECTION INTRAMUSCULAR; INTRAVENOUS EVERY 6 HOURS PRN
Status: DISCONTINUED | OUTPATIENT
Start: 2021-07-19 | End: 2021-07-19

## 2021-07-19 RX ORDER — MAGNESIUM HYDROXIDE/ALUMINUM HYDROXICE/SIMETHICONE 120; 1200; 1200 MG/30ML; MG/30ML; MG/30ML
30 SUSPENSION ORAL EVERY 4 HOURS PRN
Status: DISCONTINUED | OUTPATIENT
Start: 2021-07-19 | End: 2021-07-24 | Stop reason: HOSPADM

## 2021-07-19 RX ORDER — OLANZAPINE 5 MG/1
5-10 TABLET, ORALLY DISINTEGRATING ORAL EVERY 6 HOURS PRN
Status: DISCONTINUED | OUTPATIENT
Start: 2021-07-19 | End: 2021-07-24 | Stop reason: HOSPADM

## 2021-07-19 RX ORDER — AMOXICILLIN 250 MG
1 CAPSULE ORAL AT BEDTIME
Status: DISCONTINUED | OUTPATIENT
Start: 2021-07-19 | End: 2021-07-23

## 2021-07-19 RX ORDER — CEPHALEXIN 500 MG/1
500 CAPSULE ORAL 4 TIMES DAILY
Status: DISCONTINUED | OUTPATIENT
Start: 2021-07-19 | End: 2021-07-22

## 2021-07-19 RX ORDER — FLUMAZENIL 0.1 MG/ML
0.2 INJECTION, SOLUTION INTRAVENOUS
Status: DISCONTINUED | OUTPATIENT
Start: 2021-07-19 | End: 2021-07-24 | Stop reason: HOSPADM

## 2021-07-19 RX ORDER — CEPHALEXIN 500 MG/1
500 CAPSULE ORAL EVERY 12 HOURS SCHEDULED
Status: DISCONTINUED | OUTPATIENT
Start: 2021-07-19 | End: 2021-07-19

## 2021-07-19 RX ORDER — IBUPROFEN 200 MG
800 TABLET ORAL EVERY 6 HOURS PRN
Status: ON HOLD | COMMUNITY
End: 2021-07-22

## 2021-07-19 RX ORDER — DEXTROSE MONOHYDRATE 25 G/50ML
25 INJECTION, SOLUTION INTRAVENOUS ONCE
Status: COMPLETED | OUTPATIENT
Start: 2021-07-19 | End: 2021-07-19

## 2021-07-19 RX ORDER — SODIUM CHLORIDE 9 MG/ML
INJECTION, SOLUTION INTRAVENOUS CONTINUOUS
Status: DISCONTINUED | OUTPATIENT
Start: 2021-07-19 | End: 2021-07-19

## 2021-07-19 RX ORDER — SENNOSIDES 8.6 MG
2 TABLET ORAL AT BEDTIME
Status: DISCONTINUED | OUTPATIENT
Start: 2021-07-19 | End: 2021-07-19

## 2021-07-19 RX ORDER — LANOLIN ALCOHOL/MO/W.PET/CERES
100 CREAM (GRAM) TOPICAL DAILY
Status: COMPLETED | OUTPATIENT
Start: 2021-07-19 | End: 2021-07-23

## 2021-07-19 RX ORDER — LORAZEPAM 1 MG/1
1-2 TABLET ORAL EVERY 30 MIN PRN
Status: DISCONTINUED | OUTPATIENT
Start: 2021-07-19 | End: 2021-07-22

## 2021-07-19 RX ORDER — SUCRALFATE ORAL 1 G/10ML
1 SUSPENSION ORAL
Status: DISCONTINUED | OUTPATIENT
Start: 2021-07-19 | End: 2021-07-24 | Stop reason: HOSPADM

## 2021-07-19 RX ORDER — KETOROLAC TROMETHAMINE 15 MG/ML
15 INJECTION, SOLUTION INTRAMUSCULAR; INTRAVENOUS ONCE
Status: COMPLETED | OUTPATIENT
Start: 2021-07-19 | End: 2021-07-19

## 2021-07-19 RX ORDER — KETOROLAC TROMETHAMINE 15 MG/ML
15 INJECTION, SOLUTION INTRAMUSCULAR; INTRAVENOUS EVERY 6 HOURS PRN
Status: DISCONTINUED | OUTPATIENT
Start: 2021-07-19 | End: 2021-07-20

## 2021-07-19 RX ORDER — TRAZODONE HYDROCHLORIDE 50 MG/1
50 TABLET, FILM COATED ORAL
Status: DISCONTINUED | OUTPATIENT
Start: 2021-07-19 | End: 2021-07-24 | Stop reason: HOSPADM

## 2021-07-19 RX ORDER — ONDANSETRON 4 MG/1
4 TABLET, ORALLY DISINTEGRATING ORAL EVERY 6 HOURS PRN
Status: DISCONTINUED | OUTPATIENT
Start: 2021-07-19 | End: 2021-07-19

## 2021-07-19 RX ADMIN — ONDANSETRON 4 MG: 2 INJECTION INTRAMUSCULAR; INTRAVENOUS at 02:04

## 2021-07-19 RX ADMIN — LORAZEPAM 2 MG: 1 TABLET ORAL at 03:35

## 2021-07-19 RX ADMIN — HYDROMORPHONE HYDROCHLORIDE 0.5 MG: 1 INJECTION, SOLUTION INTRAMUSCULAR; INTRAVENOUS; SUBCUTANEOUS at 10:29

## 2021-07-19 RX ADMIN — PROCHLORPERAZINE EDISYLATE 10 MG: 5 INJECTION INTRAMUSCULAR; INTRAVENOUS at 20:12

## 2021-07-19 RX ADMIN — Medication 100 MG: at 08:16

## 2021-07-19 RX ADMIN — HYDROMORPHONE HYDROCHLORIDE 0.5 MG: 1 INJECTION, SOLUTION INTRAMUSCULAR; INTRAVENOUS; SUBCUTANEOUS at 12:46

## 2021-07-19 RX ADMIN — Medication 5 MG: at 02:05

## 2021-07-19 RX ADMIN — Medication 1 TABLET: at 08:16

## 2021-07-19 RX ADMIN — HYDROMORPHONE HYDROCHLORIDE 0.5 MG: 1 INJECTION, SOLUTION INTRAMUSCULAR; INTRAVENOUS; SUBCUTANEOUS at 04:58

## 2021-07-19 RX ADMIN — CEPHALEXIN 500 MG: 500 CAPSULE ORAL at 20:05

## 2021-07-19 RX ADMIN — FOLIC ACID 1 MG: 1 TABLET ORAL at 08:25

## 2021-07-19 RX ADMIN — HYDROMORPHONE HYDROCHLORIDE 1 MG: 1 INJECTION, SOLUTION INTRAMUSCULAR; INTRAVENOUS; SUBCUTANEOUS at 20:04

## 2021-07-19 RX ADMIN — KETOROLAC TROMETHAMINE 15 MG: 15 INJECTION, SOLUTION INTRAMUSCULAR; INTRAVENOUS at 22:13

## 2021-07-19 RX ADMIN — SUCRALFATE 1 G: 1 SUSPENSION ORAL at 12:14

## 2021-07-19 RX ADMIN — SUCRALFATE 1 G: 1 SUSPENSION ORAL at 08:16

## 2021-07-19 RX ADMIN — KETOROLAC TROMETHAMINE 15 MG: 15 INJECTION, SOLUTION INTRAMUSCULAR; INTRAVENOUS at 14:47

## 2021-07-19 RX ADMIN — ONDANSETRON 8 MG: 2 INJECTION INTRAMUSCULAR; INTRAVENOUS at 19:00

## 2021-07-19 RX ADMIN — DEXTROSE MONOHYDRATE 25 ML: 500 INJECTION PARENTERAL at 09:45

## 2021-07-19 RX ADMIN — SUCRALFATE 1 G: 1 SUSPENSION ORAL at 20:05

## 2021-07-19 RX ADMIN — DEXTROSE AND SODIUM CHLORIDE: 5; 900 INJECTION, SOLUTION INTRAVENOUS at 09:41

## 2021-07-19 RX ADMIN — HYDROMORPHONE HYDROCHLORIDE 0.5 MG: 1 INJECTION, SOLUTION INTRAMUSCULAR; INTRAVENOUS; SUBCUTANEOUS at 16:49

## 2021-07-19 RX ADMIN — SODIUM CHLORIDE: 9 INJECTION, SOLUTION INTRAVENOUS at 01:44

## 2021-07-19 RX ADMIN — HYDROMORPHONE HYDROCHLORIDE 0.5 MG: 1 INJECTION, SOLUTION INTRAMUSCULAR; INTRAVENOUS; SUBCUTANEOUS at 02:00

## 2021-07-19 RX ADMIN — ONDANSETRON 4 MG: 2 INJECTION INTRAMUSCULAR; INTRAVENOUS at 14:47

## 2021-07-19 RX ADMIN — KETOROLAC TROMETHAMINE 15 MG: 15 INJECTION, SOLUTION INTRAMUSCULAR; INTRAVENOUS at 00:55

## 2021-07-19 RX ADMIN — DEXTROSE AND SODIUM CHLORIDE: 5; 900 INJECTION, SOLUTION INTRAVENOUS at 19:00

## 2021-07-19 RX ADMIN — CEPHALEXIN 500 MG: 500 CAPSULE ORAL at 08:16

## 2021-07-19 RX ADMIN — DOCUSATE SODIUM 50 MG AND SENNOSIDES 8.6 MG 1 TABLET: 8.6; 5 TABLET, FILM COATED ORAL at 20:05

## 2021-07-19 RX ADMIN — CEPHALEXIN 500 MG: 500 CAPSULE ORAL at 17:38

## 2021-07-19 RX ADMIN — HYDROMORPHONE HYDROCHLORIDE 0.5 MG: 1 INJECTION, SOLUTION INTRAMUSCULAR; INTRAVENOUS; SUBCUTANEOUS at 08:16

## 2021-07-19 RX ADMIN — ONDANSETRON 4 MG: 2 INJECTION INTRAMUSCULAR; INTRAVENOUS at 10:29

## 2021-07-19 RX ADMIN — SUCRALFATE 1 G: 1 SUSPENSION ORAL at 16:50

## 2021-07-19 RX ADMIN — HYDROMORPHONE HYDROCHLORIDE 0.5 MG: 1 INJECTION, SOLUTION INTRAMUSCULAR; INTRAVENOUS; SUBCUTANEOUS at 15:46

## 2021-07-19 ASSESSMENT — ACTIVITIES OF DAILY LIVING (ADL): DEPENDENT_IADLS:: INDEPENDENT

## 2021-07-19 ASSESSMENT — MIFFLIN-ST. JEOR: SCORE: 1212.53

## 2021-07-19 NOTE — PROGRESS NOTES
"[facility]    Medicine Progress Note - Hospitalist Service     Summary: 36 year old female with a pertinent history of alcohol abuse, pancreatitis, alcoholic hepatitis and migraines, who presents to [facility] ED via walk-in for evaluation of abdominal pain and headache.     The patient reports epigastric abdominal pain that began yesterday and now radiates to her back. The pain feels like the \"worst heartburn ever\" and is exacerbated by \"everything\" but particularly with taking po. She reports associated nausea and had one episode of vomiting morning of admission. She has only been able to take sips of water and has not been able to eat. Patient reports a history of pancreatitis and feels like her symptoms today are similar to this. She has previously had to be admitted for pancreatitis. No diarrhea or constipation.      Patient reports gradual onset of a typical migraine yesterday for which she took a Maxalt. She believes this \"set off\" her abdominal pain. Patient developed another headache this afternoon when her abdominal pain started to get more severe. She reports throbbing pain at the base of her skull radiating to the left-side of her head.      Patient additionally believes she may have a UTI. She reports decreased urination and dysuria. Of note, she has an IUD in place. LMP was 1-2 months ago.    Date of Admission:  7/18/2021    Assessment & Plan         Acute alcohol pancreatitis - persistent, still active, lipase rising  Alcohol gastritis  Supportive management, pain and nausea control, IV hydration, sips of fluids    Alcohol abuse, intoxication  Patient was advised to completely quit drinking given transaminitis, hepatic steatosis and cytopenias   evaluation rule 25 assessment  Monitor with CIWA    Hypoglycemia  Likely due to Alcohol effects  D5 with normal saline  Check bs qid    Alcohol hepatitis  ,  -> lower today  Renal function within normal " range  Check INR  Abdominal ultrasound to reevaluate hepatic steatosis    Acute cystitis without hematuria  Continue cephalexin  Follow-up urine culture    Leukopenia, thrombocytopenia  Likely temporary bone marrow suppression secondary to alcohol abuse  Monitor counts    Migraines headache  Patient convinced that Maxalt exacerbated her pancreatitis. I advised her that it can cause some nausea and discomfort but less likely the culprit of pancreatitis.    8. Hypomagnesemia  Secondary to alcohol abuse  Replace per protocol       Diet: Clear Liquid Diet    DVT Prophylaxis: Low Risk/Ambulatory with no VTE prophylaxis indicated  Grullon Catheter: Not present  Central Lines: None  Code Status: Full Code      Disposition Plan   Expected discharge: 7/21/2021 recommended to prior living arrangement once adequate pain management/ tolerating PO medications and pancreatitis clinically resolving.     The patient's care was discussed with the Bedside Nurse and Patient.    Nakul Davis MD  Hospitalist Service  [facility]  Securely message with the Vocera Web Console (learn more here)  Text page via [employer] Paging/Directory    Interval History   Patient continues with nausea, significant persistent mid-epigastric pain. No BM yet. Urinating ok.    Data reviewed today: I reviewed all medications, new labs and imaging results over the last 24 hours. I personally reviewed no images or EKG's today.    Physical Exam   Vital Signs: Temp: 98.1  F (36.7  C) Temp src: Oral BP: (!) 137/99 Pulse: 77   Resp: 17 SpO2: 98 % O2 Device: None (Room air)    Weight: 120 lbs 14.4 oz  Constitutional: awake, alert and complaining of abd pain  ENT: normocepalic, without obvious abnormality, atramatic  Respiratory: No increased work of breathing, good air exchange, clear to auscultation bilaterally, no crackles or wheezing  Cardiovascular: Normal apical impulse, regular rate and rhythm, normal S1 and S2, no S3 or S4, and no murmur  noted  GI: , soft, non-distended and tenderness noted in the epigastric region    Data   Recent Labs   Lab 07/19/21  1006 07/19/21  0926 07/19/21  0859 07/19/21  0751 07/18/21  2156   WBC  --   --   --  4.0 3.5*   HGB  --   --   --  11.0* 12.9   MCV  --   --   --  100 98   PLT  --   --   --  75* 91*   INR  --   --   --  1.15  --    NA  --   --   --  137 139   POTASSIUM  --   --   --  4.8 4.1   CHLORIDE  --   --   --  102 103   CO2  --   --   --  23 23   BUN  --   --   --  9 7*   CR  --   --   --  0.67 0.62   ANIONGAP  --   --   --  12 13   LANNY  --   --   --  8.6 9.3   * 59* 56* 56* 76   ALBUMIN  --   --   --  3.5 3.9   PROTTOTAL  --   --   --  6.2 7.2   BILITOTAL  --   --   --  0.9 0.8   ALKPHOS  --   --   --  90 99   ALT  --   --   --  122* 147*   AST  --   --   --  183* 262*   LIPASE  --   --   --  340* 292*

## 2021-07-19 NOTE — PLAN OF CARE
Blood glucose was low this am at 56. Attempted to take in some apple juice but could not drink it fast. Bg only up to 59. D50 25 ml given.  BG within normal limits now.  Ivf changed to D5NS.  Started Carafate before meals today. Tolerating a few sips on clears.  Pain controlled with iv dilaudid. New orders in for po and pt was educated on how these work and suggested trying po vs the IV.  Voiding freely. No stool today.

## 2021-07-19 NOTE — ED TRIAGE NOTES
"Pt states \"I have pancreatitis\". C/o back pain, headache, abdominal pain and nausea. Pt states \"I cant take migraine medicine because it contributes to pancreatis\".   "

## 2021-07-19 NOTE — PLAN OF CARE
Problem: Pain (Pancreatitis)  Goal: Acceptable Pain Control  Outcome: No Change     Problem: Alcohol Withdrawal  Goal: Alcohol Withdrawal Symptom Control  Outcome: Declining     Patient on CIWA scored 18, complained of severe anxiety, tremors, nausea and mild headache. Some mild agitation noted. Given prn ativan 2 mg with good effect. Vital signs are stable. Given another prn iv Dilaudid for abdominal pain rated 7/10. Able to walk independently. Pt taken for abdominal ultrasound scan, waiting for results.

## 2021-07-19 NOTE — CONSULTS
Care Management Initial Consult    General Information  Assessment completed with: Patient,  (Fabiola Raymond)  Type of CM/SW Visit: Initial Assessment    Primary Care Provider verified and updated as needed: Yes   Readmission within the last 30 days: no previous admission in last 30 days      Reason for Consult: discharge planning  Advance Care Planning: Advance Care Planning Reviewed: no concerns identified          Communication Assessment  Patient's communication style: spoken language (English or Bilingual)             Cognitive  Cognitive/Neuro/Behavioral: motor response                      Living Environment:   People in home: child(enriqueta), dependent, parent(s)   (With 2 child 10 and 4 years old son  and parents )  Current living Arrangements: house        Able to return to prior arrangements: yes  Living Arrangement Comments:  (Live with 2 young  son and parents)    Family/Social Support:  Care provided by: self  Provides care for: no one  Marital Status:           Description of Support System: Parents  Supportive    Support Adequate social supports    Current Resources:   Patient receiving home care services: None     Community Resources: None  Equipment currently used at home: none  Supplies currently used at home: None    Employment/Financial:  Employment Status: employed full-time     Employment/ Comments:  (unknown)  Financial Concerns: No concerns identified   Referral to Financial Counselor: No  Finance Comments:  (none)    Lifestyle & Psychosocial Needs:  Social Determinants of Health     Tobacco Use: High Risk     Smoking Tobacco Use: Current Some Day Smoker     Smokeless Tobacco Use: Never Used   Alcohol Use:      Frequency of Alcohol Consumption:      Average Number of Drinks:      Frequency of Binge Drinking:    Financial Resource Strain:      Difficulty of Paying Living Expenses:    Food Insecurity:      Worried About Running Out of Food in the Last Year:      Ran Out of Food in the  Last Year:    Transportation Needs:      Lack of Transportation (Medical):      Lack of Transportation (Non-Medical):    Physical Activity:      Days of Exercise per Week:      Minutes of Exercise per Session:    Stress:      Feeling of Stress :    Social Connections:      Frequency of Communication with Friends and Family:      Frequency of Social Gatherings with Friends and Family:      Attends Adventism Services:      Active Member of Clubs or Organizations:      Attends Club or Organization Meetings:      Marital Status:    Intimate Partner Violence:      Fear of Current or Ex-Partner:      Emotionally Abused:      Physically Abused:      Sexually Abused:    Depression:      PHQ-2 Score:    Housing Stability:      Unable to Pay for Housing in the Last Year:      Number of Places Lived in the Last Year:      Unstable Housing in the Last Year:        Functional Status:  Prior to admission patient needed assistance:   Dependent ADLs:: Independent  Dependent IADLs:: Independent  Assesssment of Functional Status: At functional baseline    Mental Health Status:  Mental Health Status: Current Concern       Chemical Dependency Status:  Chemical Dependency Status: Current Concern  Alcohol abuse.           Values/Beliefs:  Spiritual, Cultural Beliefs, Adventism Practices, Values that affect care:           none      Additional Information:    XU NAVARRO met with patient  In her room and introduce self, CM role &  complete patient initial assessment & discussed her SUDS assessment & discuss her discharge goal.  Pt reports she resides from home with 2 child 10 years old and 4 years old son and parents & works full time & independent at baseline. Pt reports she drive, she does not have home health care, she does not uses any equipment, she reports she is very much independent and her parents are very helpful. Pt reports she does not want SUDS assessment today. Pt reports her discharge goal is to go home when she is medically to  ready. XU CM  keep following her SUDS assessment. Pt looks like very tired and she does not want to  share any more information. Pt reports her dad will be transport her when she is at discharge. CM to continue following as needed.    Carey Awan  MSW/NEO/MELANIE   7/19/21

## 2021-07-19 NOTE — ED NOTES
[facility] ED Handoff Report    ED Chief Complaint:  chief complaint    ED Diagnosis:  (K85.90) Acute pancreatitis, unspecified complication status, unspecified pancreatitis type  Comment: likely alcohol induced    (F10.929) Alcoholic intoxication with complication (H)  Comment: pancreatitis       PMH:    Past Medical History:   Diagnosis Date     Depression      Depression      Migraine      Migraine      Pancreatitis      Pancreatitis         Code Status:  No Order     Falls Risk: No    Current Living Situation/Residence: independent. Private residence.    Elimination Status:  Continent.    Activity Level:  Independent.    Patients Preferred Language:  English     Needed: No    Infection:  [unfilled]     Vital Signs:  /88   Pulse 83   Temp 98.1  F (36.7  C) (Temporal)   Resp 18   Wt 52.2 kg (115 lb)   SpO2 98%   BMI 20.37 kg/m       Pain Score:  5/10    Is the Patient Confused:  No    Last Food or Drink: today    Tests Performed:  Lab;imaging.    Abnormal Results:    Abnormal Labs Reviewed   CBC WITH PLATELETS - Abnormal; Notable for the following components:       Result Value    WBC Count 3.5 (*)     MCH 33.9 (*)     RDW 15.1 (*)     Platelet Count 91 (*)     All other components within normal limits   BASIC METABOLIC PANEL - Abnormal; Notable for the following components:    Urea Nitrogen 7 (*)     All other components within normal limits   HEPATIC FUNCTION PANEL - Abnormal; Notable for the following components:     (*)      (*)     All other components within normal limits   LIPASE - Abnormal; Notable for the following components:    Lipase 292 (*)     All other components within normal limits   ROUTINE UA WITH MICROSCOPIC REFLEX TO CULTURE - Abnormal; Notable for the following components:    Appearance Urine Turbid (*)     Leukocyte Esterase Urine 500 Lorrie/uL (*)     Bacteria Urine Moderate (*)     Mucus Urine Present (*)     WBC Urine 15 (*)     Squamous  Epithelials Urine 5 (*)     All other components within normal limits    Narrative:     Urine Culture ordered based on laboratory criteria   ETHYL ALCOHOL LEVEL - Abnormal; Notable for the following components:    Alcohol, Blood 125 (*)     All other components within normal limits       No orders to display        Treatments Provided:  See charting.    Family Dynamics/Concerns: No    Family Updated On Visitor Policy: No    Plan of Care Communicated to Family: No    Who Was Updated about Plan of Care: Pt declined updating.    Belongings Checklist Done and Signed by Patient: Yes    asymtomatic.    ED Medications:    Medications   lactated ringers BOLUS 1,000 mL (1,000 mLs Intravenous New Bag 7/18/21 2157)   metoclopramide (REGLAN) injection 10 mg (10 mg Intravenous Given 7/18/21 2158)   diphenhydrAMINE (BENADRYL) injection 25 mg (25 mg Intravenous Given 7/18/21 2158)   cephALEXin (KEFLEX) capsule 250 mg (250 mg Oral Given 7/18/21 2301)   HYDROmorphone (DILAUDID) injection 1 mg (1 mg Intravenous Given 7/18/21 2301)       @[facility]@ 7/18/2021 11:51 PM

## 2021-07-19 NOTE — UTILIZATION REVIEW
Admission Status; Secondary Review Determination   Under the authority of the Utilization Management Committee, the utilization review process indicated a secondary review on Fabiola Raymond. The review outcome is based on review of the medical records, discussions with staff, and applying clinical experience noted on the date of the review.   (x) Inpatient Status Appropriate - This patient's medical care is consistent with medical management for inpatient care and reasonable inpatient medical practice.     RATIONALE FOR DETERMINATION   36 yr old female presented with abdominal pain.  Acute alcohol pancreatitis with lipase rising, alcohol gastritis and intoxication on presentation.  Now on CIWA monitoring.  Having hypoglycemia and on D5.      At the time of admission with the information available to the attending physician more than 2 nights Hospital complex care was anticipated, based on patient risk of adverse outcome if treated as outpatient and complex care required. Inpatient admission is appropriate based on the Medicare guidelines.   The information on this document is developed by the utilization review team in order for the business office to ensure compliance. This only denotes the appropriateness of proper admission status and does not reflect the quality of care rendered.   The definitions of Inpatient Status and Observation Status used in making the determination above are those provided in the CMS Coverage Manual, Chapter 1 and Chapter 6, section 70.4.   Sincerely,   Isa Topete MD  Utilization Review  Physician Advisor  [facility]

## 2021-07-19 NOTE — ED PROVIDER NOTES
"  Emergency Department Encounter     Evaluation Date & Time:   2021  9:14 PM    CHIEF COMPLAINT:  Abdominal Pain, Back Pain, and Headache      Triage Note:Pt states \"I have pancreatitis\". C/o back pain, headache, abdominal pain and nausea. Pt states \"I cant take migraine medicine because it contributes to pancreatis\".         Impression and Plan       FINAL IMPRESSION:    ICD-10-CM    1. Acute pancreatitis, unspecified complication status, unspecified pancreatitis type  K85.90     likely alcohol induced   2. Alcoholic intoxication with complication (H)  F10.929     pancreatitis   3. Acute cystitis without hematuria  N30.00          ED COURSE & MEDICAL DECISION MAKIN:24 PM I met with the patient to gather history and perform an initial exam. The patient was masked and I was wearing PPE including gloves, N95 mask, surgical mask, and surgical cap.  11:43 [facility] Patient accepted for admission to observation.    36 year old female, history of alcohol abuse with alcohol hepatitis and pancreatitis and migraine headaches, who presents for evaluation of burning epigastric abdominal pain radiating to her back, worsening since onset yesterday and associated with nausea with one episode of vomiting.  She also reports dysuria.    On exam, abdomen is soft with mild tenderness to palpation of the epigastrium; no peritoneal signs.    IV access established, blood sent for labs and IVF initiated.    UPT negative.  UA suggestive of infection with 500 LE, 15 WBCs and moderate bacteria; urine culture pending patient given a dose of po Keflex.    Labs otherwise remarkable for leukopenia (WBC 3.5) and thrombocytopenia (PLT 91) - likely secondary to chronic alcohol abuse.  She does have hepatitis with  and , also likely secondary to alcohol abuse.  Lipase elevated to 292.  Blood alcohol level is 125. She has no significant electrolyte derangements or renal " impairment.    Exam and history most consistent with alcohol pancreatitis; I do not think emergent imaging studies are indicated.    She also reports gradual onset of throbbing headache yesterday that is consistent with her typical migraines. Neuro exam is normal.  I do not suspect ICH, mass lesion, SAH, meningitis, encephalitis or other neurosurgical emergency and do not think emergent imaging and / or LP are indicated.  Patient given IVF, IV Reglan and IV Benadryl with improvement.    Patient with ongoing abdominal pain and nausea, thus decision made to admit patient for further symptom management.  Patient hemodynamically stable throughout ED course.      At the conclusion of the encounter I discussed the results of all the tests and the disposition. The questions were answered. The patient acknowledged understanding and was agreeable with the care plan.      MEDICATIONS GIVEN IN THE EMERGENCY DEPARTMENT:  Medications   ondansetron (ZOFRAN-ODT) ODT tab 4 mg ( Oral See Alternative 7/19/21 1029)     Or   ondansetron (ZOFRAN) injection 4 mg (4 mg Intravenous Given 7/19/21 1029)   cephALEXin (KEFLEX) capsule 500 mg (500 mg Oral Given 7/19/21 0816)   alum & mag hydroxide-simethicone (MAALOX) suspension 30 mL (has no administration in time range)   calcium carbonate (TUMS) chewable tablet 500 mg (has no administration in time range)   OLANZapine zydis (zyPREXA) ODT tab 5-10 mg (has no administration in time range)     Or   haloperidol lactate (HALDOL) injection 2.5-5 mg (has no administration in time range)   flumazenil (ROMAZICON) injection 0.2 mg (has no administration in time range)   melatonin tablet 5 mg (5 mg Oral Given 7/19/21 0205)   LORazepam (ATIVAN) tablet 1-2 mg (2 mg Oral Given 7/19/21 0335)     Or   LORazepam (ATIVAN) injection 1-2 mg ( Intravenous See Alternative 7/19/21 0335)   thiamine (B-1) tablet 100 mg (100 mg Oral Given 7/19/21 0816)   folic acid (FOLVITE) tablet 1 mg (1 mg Oral Given 7/19/21 0825)  "  multivitamin w/minerals (THERA-VIT-M) tablet 1 tablet (1 tablet Oral Given 7/19/21 0816)   naloxone (NARCAN) injection 0.2 mg (has no administration in time range)     Or   naloxone (NARCAN) injection 0.4 mg (has no administration in time range)     Or   naloxone (NARCAN) injection 0.2 mg (has no administration in time range)     Or   naloxone (NARCAN) injection 0.4 mg (has no administration in time range)   sucralfate (CARAFATE) suspension 1 g (1 g Oral Given 7/19/21 1214)   dextrose 5% and 0.9% NaCl infusion ( Intravenous New Bag 7/19/21 0941)   oxyCODONE (ROXICODONE) tablet 5-10 mg (has no administration in time range)   ketorolac (TORADOL) injection 15 mg (has no administration in time range)   HYDROmorphone (PF) (DILAUDID) injection 0.5-1 mg (has no administration in time range)   lactated ringers BOLUS 1,000 mL (0 mLs Intravenous Stopped 7/19/21 0021)   metoclopramide (REGLAN) injection 10 mg (10 mg Intravenous Given 7/18/21 2158)   diphenhydrAMINE (BENADRYL) injection 25 mg (25 mg Intravenous Given 7/18/21 2158)   cephALEXin (KEFLEX) capsule 250 mg (250 mg Oral Given 7/18/21 2301)   HYDROmorphone (DILAUDID) injection 1 mg (1 mg Intravenous Given 7/18/21 2301)   ketorolac (TORADOL) injection 15 mg (15 mg Intravenous Given 7/19/21 0055)   dextrose 50 % injection 25 mL (25 mLs Intravenous Given 7/19/21 0945)       NEW PRESCRIPTIONS STARTED AT TODAY'S ED VISIT:  Current Discharge Medication List          HPI     HPI     Fabiola Raymond is a 36 year old female with a pertinent history of alcohol abuse, pancreatitis, alcoholic hepatitis and migraines, who presents to this ED via walk-in for evaluation of abdominal pain and headache.    The patient reports epigastric abdominal pain that began yesterday and now radiates to her back. The pain feels like the \"worst heartburn ever\" and is exacerbated by \"everything\" but particularly with taking po. She reports associated nausea and had one episode of vomiting this " "morning. She has only been able to take sips of water and has not been able to eat. Patient reports a history of pancreatitis and feels like her symptoms today are similar to this. She has previously had to be admitted for pancreatitis. No diarrhea or constipation.     Patient reports gradual onset of a typical migraine yesterday for which she took a Maxalt. She believes this \"set off\" her abdominal pain. Patient developed another headache this afternoon when her abdominal pain started to get more severe. She reports throbbing pain at the base of her skull radiating to the left-side of her head.     Patient additionally believes she may have a UTI. She reports decreased urination and dysuria. Of note, she has an IUD in place. LMP was 1-2 months ago.    Patient has otherwise been in her usual state of health and denies chest pain, sob, cough, fevers or other concerns.    REVIEW OF SYSTEMS:  All other systems reviewed and are negative.      Medical History     Past Medical History:   Diagnosis Date     Depression      Depression      Migraine      Migraine      Pancreatitis      Pancreatitis        Past Surgical History:   Procedure Laterality Date      SECTION      x2       Family History   Problem Relation Age of Onset     Nephrolithiasis Maternal Grandfather        Social History     Tobacco Use     Smoking status: Current Some Day Smoker     Smokeless tobacco: Never Used     Tobacco comment: 1 cigarette every other week   Substance Use Topics     Alcohol use: Yes     Alcohol/week: 0.0 - 10.0 standard drinks     Comment: Alcoholic Drinks/day: varies from none to several drinks a day, drinks bi weekly     Drug use: Never       No current outpatient medications on file.      Physical Exam     First Vitals:  Patient Vitals for the past 24 hrs:   BP Temp Temp src Pulse Resp SpO2 Height Weight   21 1211 (!) 137/99 98.1  F (36.7  C) Oral 77 17 98 % -- --   21 0800 119/85 98.5  F (36.9  C) Oral 66 17 " "97 % -- --   07/19/21 0400 132/88 98.5  F (36.9  C) Oral 62 16 99 % -- --   07/19/21 0330 135/88 98.1  F (36.7  C) Oral 65 22 99 % -- --   07/19/21 0105 121/85 98.1  F (36.7  C) Oral 62 18 -- 1.6 m (5' 3\") 54.8 kg (120 lb 14.4 oz)   07/18/21 2300 125/88 -- -- 83 -- 98 % -- --   07/18/21 2240 -- -- -- 89 -- 97 % -- --   07/18/21 2230 121/86 -- -- 81 -- 97 % -- --   07/18/21 2220 -- -- -- 83 -- 99 % -- --   07/18/21 2213 -- -- -- 92 -- 98 % -- --   07/18/21 2210 118/82 -- -- 95 -- 99 % -- --   07/18/21 2140 -- -- -- -- -- 98 % -- --   07/18/21 2105 (!) 137/94 98.1  F (36.7  C) Temporal 95 18 97 % -- 52.2 kg (115 lb)       PHYSICAL EXAM:   Physical Exam    GENERAL: Awake, alert.  In mild acute distress.   HEENT: Normocephalic, atraumatic. Pupils equal, round and reactive. Conjunctiva normal. EOMI without nystagmus.  NECK: No stridor.  PULMONARY: Symmetrical breath sounds without distress.  Lungs clear to auscultation bilaterally without wheezes, rhonchi or rales.  CARDIO: Borderline tachycardic rate with regular rhythm.  No significant murmur, rub or gallop.  Radial pulses strong and symmetrical.  ABDOMINAL: Abdomen soft, non-distended with mild tenderness to palpation of the epigastrium; no rebound tenderness or guarding.  No CVAT, BL.  EXTREMITIES: No lower extremity swelling or edema.      NEURO: Alert and oriented to person, place and time.  Cranial nerves III-XII intact.  Strength 5/5 BL upper and lower extremities with sensation to light touch grossly intact.  PSYCH: Normal mood and affect.  SKIN: No rashes.     Results     LAB:  All pertinent labs reviewed and interpreted  Labs Ordered and Resulted from Time of ED Arrival Up to the Time of Departure from the ED   CBC WITH PLATELETS - Abnormal; Notable for the following components:       Result Value    WBC Count 3.5 (*)     MCH 33.9 (*)     RDW 15.1 (*)     Platelet Count 91 (*)     All other components within normal limits   BASIC METABOLIC PANEL - Abnormal; " Notable for the following components:    Urea Nitrogen 7 (*)     All other components within normal limits   HEPATIC FUNCTION PANEL - Abnormal; Notable for the following components:     (*)      (*)     All other components within normal limits   LIPASE - Abnormal; Notable for the following components:    Lipase 292 (*)     All other components within normal limits   ROUTINE UA WITH MICROSCOPIC REFLEX TO CULTURE - Abnormal; Notable for the following components:    Appearance Urine Turbid (*)     Leukocyte Esterase Urine 500 Lorrie/uL (*)     Bacteria Urine Moderate (*)     Mucus Urine Present (*)     WBC Urine 15 (*)     Squamous Epithelials Urine 5 (*)     All other components within normal limits    Narrative:     Urine Culture ordered based on laboratory criteria   ETHYL ALCOHOL LEVEL - Abnormal; Notable for the following components:    Alcohol, Blood 125 (*)     All other components within normal limits   HCG QUALITATIVE URINE POCT - Normal   MAY SALINE LOCK IV   URINE CULTURE       RADIOLOGY:  US Abdomen Limited   Final Result   IMPRESSION:   1.  Sludge in the gallbladder. No specific evidence for cholecystitis.   2.  Hepatic steatosis.                [facility] Documentation         I, Myla Morel, am serving as a scribe to document services personally performed by Molly Parkinson MD based on my observation and the provider's statements to me. I, Molly Parkinson MD attest that Myla Morel is acting in a scribe capacity, has observed my performance of the services and has documented them in accordance with my direction.    Molly Parkinson MD  Emergency Medicine  [facility] EMERGENCY DEPARTMENT           Molly Parkinson MD  07/19/21 [id number]

## 2021-07-19 NOTE — H&P
[facility]    History and Physical - Hospitalist Service       Date of Admission:  7/18/2021    Assessment & Plan      Fabiola Raymond is a 36 year old female admitted on 7/18/2021.    1. Acute alcohol pancreatitis  2. Alcohol gastritis  Supportive management, pain and nausea control, IV hydration    3. Alcohol abuse, intoxication  Patient was advised to completely quit drinking given transaminitis, hepatic steatosis and cytopenias   evaluation rule 25 assessment  Monitor with CIWA    4. Alcohol hepatitis  ,   Renal function within normal range  Check INR  Abdominal ultrasound to reevaluate hepatic steatosis    5. Acute cystitis without hematuria  Continue cephalexin  Follow-up urine culture    6. Leukopenia, thrombocytopenia  Likely secondary to alcohol abuse  Monitor counts    7. Migraines headache  Patient was convinced that Maxalt exacerbated her pancreatitis. I advised her that it can cause some nausea and discomfort but less likely the culprit of pancreatitis.    8. Hypomagnesemia  Secondary to alcohol abuse  Replace per protocol       Diet: Clear Liquid Diet    DVT Prophylaxis: Low Risk/Ambulatory with no VTE prophylaxis indicated  Grullon Catheter: Not present  Central Lines: None  Code Status: Full Code      Risk Factors Present on Admission              # Thrombocytopenia: Plts = 91 10e3/uL (Ref range: 150 - 450 10e3/uL) on admission, will monitor for bleeding      Disposition Plan   Expected discharge: 1 day recommended to prior living arrangement once adequate pain management/ tolerating PO medications.     The patient's care was discussed with the Patient.    Florentino Blake MD  [facility]  Securely message with the Vocera Web Console (learn more here)  Text page via [employer] Paging/Directory      ______________________________________________________________________    Chief Complaint   Abdominal pain, nausea, vomiting    History is  obtained from the patient    History of Present Illness   Fabiola Raymond is a 36 year old female who came to the emergency department for evaluation of abdominal pain, nausea and vomiting. She has had abdominal pain since 4 July weekend when she drank heavily. She has been taking Tums with some relief and the pain has been intermittent. However, since 1 day prior to ED evaluation the pain has exacerbated with associated nausea and vomiting after she took a dose of Maxalt for migraine headache. She denies fevers, chills, hematemesis, melena or hematochezia. She states drinks alcohol three times per week and is trying to quit. She smokes cigarettes and wishes to quit as well and denies illicit drugs. She was admitted to this hospital back in 2020 with alcohol-related pancreatitis. She felt similar epigastric abdominal pain with radiation to the back. The pain exacerbated with movement and deep breathing. She also complains of decreased urination and dysuria.    Review of Systems    The 10 point Review of Systems is negative other than noted in the HPI or here.     Past Medical History    I have reviewed this patient's medical history and updated it with pertinent information if needed.   Past Medical History:   Diagnosis Date     Depression      Depression      Migraine      Migraine      Pancreatitis      Pancreatitis        Past Surgical History   I have reviewed this patient's surgical history and updated it with pertinent information if needed.  Past Surgical History:   Procedure Laterality Date      SECTION      x2       Social History   I have reviewed this patient's social history and updated it with pertinent information if needed.  Social History     Tobacco Use     Smoking status: Current Some Day Smoker     Smokeless tobacco: Never Used     Tobacco comment: 1 cigarette every other week   Substance Use Topics     Alcohol use: Yes     Alcohol/week: 0.0 - 10.0 standard drinks     Comment:  Alcoholic Drinks/day: varies from none to several drinks a day, drinks bi weekly     Drug use: Never       Family History   I have reviewed this patient's family history and updated it with pertinent information if needed.  Family History   Problem Relation Age of Onset     Nephrolithiasis Maternal Grandfather        Prior to Admission Medications   Prior to Admission Medications   Prescriptions Last Dose Informant Patient Reported? Taking?   FLAGYL 500 MG PO TABS   Yes No   Si TABLET ORALLY 2 TIMES A DAY   PRENATAL VITAMIN OR   Yes No   Si TABLET ORALLY DAILY      Facility-Administered Medications: None     Allergies   Allergies   Allergen Reactions     Oxycodone-Acetaminophen Rash       Physical Exam   Vital Signs: Temp: 98.1  F (36.7  C) Temp src: Oral BP: 121/85 Pulse: 62   Resp: 18 SpO2: 98 % O2 Device: None (Room air)    Weight: 120 lbs 14.4 oz    General: In no acute distress  Heart: Regular rhythm  Lungs: Clear to auscultation  Abdomen: Nondistended, epigastric tenderness, bowel sounds present  Extremities: No edema  Skin: Warm, dry  Neuro: Nonfocal  Psychiatric: Depressed mood    Data   Data reviewed today: I reviewed all medications, new labs and imaging results over the last 24 hours. I personally reviewed no images or EKG's today.    Recent Labs   Lab 21  2156   WBC 3.5*   HGB 12.9   MCV 98   PLT 91*      POTASSIUM 4.1   CHLORIDE 103   CO2 23   BUN 7*   CR 0.62   ANIONGAP 13   LANNY 9.3   GLC 76   ALBUMIN 3.9   PROTTOTAL 7.2   BILITOTAL 0.8   ALKPHOS 99   *   *   LIPASE 292*     No results found for this or any previous visit (from the past 24 hour(s)).

## 2021-07-19 NOTE — PHARMACY-ADMISSION MEDICATION HISTORY
Pharmacy Note - Admission Medication History    Pertinent Provider Information: None     ______________________________________________________________________    Prior To Admission (PTA) med list completed and updated in EMR.       Prior to Admission Medications   Prescriptions Last Dose Informant Patient Reported? Taking?   ibuprofen (ADVIL/MOTRIN) 200 MG tablet 7/18/2021  Yes Yes   Sig: Take 800 mg by mouth every 6 hours as needed for mild pain   rizatriptan (MAXALT-MLT) 10 MG ODT 7/17/2021  Yes Yes   Sig: Take 10 mg by mouth every 2 hours as needed for migraine      Facility-Administered Medications: None       Information source(s): Patient and CareEverywhere/SureScripts  Method of interview communication: in-person    Summary of Changes to PTA Med List  New: Maxalt, Ibuprofen  Discontinued: Prenatal vitamin, Flagyl  Changed: none    Patient was asked about OTC/herbal products specifically.  PTA med list reflects this.    Allergies were reviewed, assessed, and updated with the patient.      Patient does not use any multi-dose medications prior to admission.    The information provided in this note is only as accurate as the sources available at the time of the update(s).    Thank you for the opportunity to participate in the care of this patient.    Melony Cast [facility]  7/19/2021 7:59 AM

## 2021-07-19 NOTE — PLAN OF CARE
Patient admitted to [facility] at 0100 with alcoholic pancreatitis. She is alert and oriented x 4. On CIWA, scored 5. Noted to have mild hand tremors. C/o nausea, poor appetite and, mild headache, abdominal pain rated 7/10, given prn iv dilaudid x 1 and iv Zofran. On clear liquid diet. Given ice chips. Started on normal saline 100 ml/hr. Also given melatonin 5 mg for insomnia. On Mg protocol, 1.7 to be rechecked tomorrow.

## 2021-07-20 ENCOUNTER — HOSPITAL ENCOUNTER (INPATIENT)
Dept: CT IMAGING | Facility: HOSPITAL | Age: 36
End: 2021-07-20
Attending: PHYSICIAN ASSISTANT
Payer: COMMERCIAL

## 2021-07-20 ENCOUNTER — HOSPITAL ENCOUNTER (INPATIENT)
Dept: RADIOLOGY | Facility: HOSPITAL | Age: 36
End: 2021-07-20
Attending: INTERNAL MEDICINE
Payer: COMMERCIAL

## 2021-07-20 LAB
ALBUMIN SERPL-MCNC: 3.4 G/DL (ref 3.5–5)
ALP SERPL-CCNC: 84 U/L (ref 45–120)
ALT SERPL W P-5'-P-CCNC: 101 U/L (ref 0–45)
ANION GAP SERPL CALCULATED.3IONS-SCNC: 4 MMOL/L (ref 5–18)
AST SERPL W P-5'-P-CCNC: 127 U/L (ref 0–40)
BILIRUB DIRECT SERPL-MCNC: 0.4 MG/DL
BILIRUB SERPL-MCNC: 0.9 MG/DL (ref 0–1)
BUN SERPL-MCNC: 3 MG/DL (ref 8–22)
CALCIUM SERPL-MCNC: 8.4 MG/DL (ref 8.5–10.5)
CHLORIDE BLD-SCNC: 109 MMOL/L (ref 98–107)
CO2 SERPL-SCNC: 28 MMOL/L (ref 22–31)
CREAT SERPL-MCNC: 0.74 MG/DL (ref 0.6–1.1)
GFR SERPL CREATININE-BSD FRML MDRD: >90 ML/MIN/1.73M2
GLUCOSE BLD-MCNC: 106 MG/DL (ref 70–125)
GLUCOSE BLDC GLUCOMTR-MCNC: 106 MG/DL (ref 70–125)
GLUCOSE BLDC GLUCOMTR-MCNC: 127 MG/DL (ref 70–125)
LIPASE SERPL-CCNC: 417 U/L (ref 0–52)
MAGNESIUM SERPL-MCNC: 1.9 MG/DL (ref 1.8–2.6)
POTASSIUM BLD-SCNC: 4.4 MMOL/L (ref 3.5–5)
PROT SERPL-MCNC: 6.2 G/DL (ref 6–8)
SODIUM SERPL-SCNC: 141 MMOL/L (ref 136–145)

## 2021-07-20 PROCEDURE — 80076 HEPATIC FUNCTION PANEL: CPT | Performed by: INTERNAL MEDICINE

## 2021-07-20 PROCEDURE — 120N000001 HC R&B MED SURG/OB

## 2021-07-20 PROCEDURE — 250N000013 HC RX MED GY IP 250 OP 250 PS 637: Performed by: INTERNAL MEDICINE

## 2021-07-20 PROCEDURE — 250N000011 HC RX IP 250 OP 636: Performed by: INTERNAL MEDICINE

## 2021-07-20 PROCEDURE — 82310 ASSAY OF CALCIUM: CPT | Performed by: INTERNAL MEDICINE

## 2021-07-20 PROCEDURE — C9113 INJ PANTOPRAZOLE SODIUM, VIA: HCPCS | Performed by: PHYSICIAN ASSISTANT

## 2021-07-20 PROCEDURE — 250N000013 HC RX MED GY IP 250 OP 250 PS 637: Performed by: HOSPITALIST

## 2021-07-20 PROCEDURE — 74177 CT ABD & PELVIS W/CONTRAST: CPT

## 2021-07-20 PROCEDURE — 36415 COLL VENOUS BLD VENIPUNCTURE: CPT | Performed by: INTERNAL MEDICINE

## 2021-07-20 PROCEDURE — 83735 ASSAY OF MAGNESIUM: CPT | Performed by: HOSPITALIST

## 2021-07-20 PROCEDURE — 99233 SBSQ HOSP IP/OBS HIGH 50: CPT | Performed by: INTERNAL MEDICINE

## 2021-07-20 PROCEDURE — 83690 ASSAY OF LIPASE: CPT | Performed by: INTERNAL MEDICINE

## 2021-07-20 PROCEDURE — 250N000011 HC RX IP 250 OP 636: Performed by: PHYSICIAN ASSISTANT

## 2021-07-20 PROCEDURE — 71046 X-RAY EXAM CHEST 2 VIEWS: CPT

## 2021-07-20 RX ORDER — IOPAMIDOL 755 MG/ML
100 INJECTION, SOLUTION INTRAVASCULAR ONCE
Status: COMPLETED | OUTPATIENT
Start: 2021-07-20 | End: 2021-07-20

## 2021-07-20 RX ADMIN — OXYCODONE HYDROCHLORIDE 10 MG: 5 TABLET ORAL at 04:05

## 2021-07-20 RX ADMIN — OXYCODONE HYDROCHLORIDE 5 MG: 5 TABLET ORAL at 16:38

## 2021-07-20 RX ADMIN — HYDROMORPHONE HYDROCHLORIDE 0.5 MG: 1 INJECTION, SOLUTION INTRAMUSCULAR; INTRAVENOUS; SUBCUTANEOUS at 23:07

## 2021-07-20 RX ADMIN — SUCRALFATE 1 G: 1 SUSPENSION ORAL at 06:52

## 2021-07-20 RX ADMIN — CEPHALEXIN 500 MG: 500 CAPSULE ORAL at 16:03

## 2021-07-20 RX ADMIN — HYDROMORPHONE HYDROCHLORIDE 0.5 MG: 1 INJECTION, SOLUTION INTRAMUSCULAR; INTRAVENOUS; SUBCUTANEOUS at 19:13

## 2021-07-20 RX ADMIN — HYDROMORPHONE HYDROCHLORIDE 0.5 MG: 1 INJECTION, SOLUTION INTRAMUSCULAR; INTRAVENOUS; SUBCUTANEOUS at 10:35

## 2021-07-20 RX ADMIN — CEPHALEXIN 500 MG: 500 CAPSULE ORAL at 20:30

## 2021-07-20 RX ADMIN — SUCRALFATE 1 G: 1 SUSPENSION ORAL at 12:13

## 2021-07-20 RX ADMIN — CEPHALEXIN 500 MG: 500 CAPSULE ORAL at 08:48

## 2021-07-20 RX ADMIN — OXYCODONE HYDROCHLORIDE 5 MG: 5 TABLET ORAL at 12:18

## 2021-07-20 RX ADMIN — CALCIUM CARBONATE (ANTACID) CHEW TAB 500 MG 500 MG: 500 CHEW TAB at 08:46

## 2021-07-20 RX ADMIN — PROCHLORPERAZINE EDISYLATE 10 MG: 5 INJECTION INTRAMUSCULAR; INTRAVENOUS at 02:57

## 2021-07-20 RX ADMIN — HYDROMORPHONE HYDROCHLORIDE 0.5 MG: 1 INJECTION, SOLUTION INTRAMUSCULAR; INTRAVENOUS; SUBCUTANEOUS at 02:59

## 2021-07-20 RX ADMIN — PANTOPRAZOLE SODIUM 40 MG: 40 INJECTION, POWDER, FOR SOLUTION INTRAVENOUS at 13:35

## 2021-07-20 RX ADMIN — HYDROMORPHONE HYDROCHLORIDE 0.5 MG: 1 INJECTION, SOLUTION INTRAMUSCULAR; INTRAVENOUS; SUBCUTANEOUS at 06:52

## 2021-07-20 RX ADMIN — ONDANSETRON 8 MG: 2 INJECTION INTRAMUSCULAR; INTRAVENOUS at 00:11

## 2021-07-20 RX ADMIN — CEPHALEXIN 500 MG: 500 CAPSULE ORAL at 13:35

## 2021-07-20 RX ADMIN — IOPAMIDOL 100 ML: 755 INJECTION, SOLUTION INTRAVENOUS at 20:41

## 2021-07-20 RX ADMIN — OXYCODONE HYDROCHLORIDE 5 MG: 5 TABLET ORAL at 21:17

## 2021-07-20 RX ADMIN — Medication 1 TABLET: at 08:46

## 2021-07-20 RX ADMIN — FOLIC ACID 1 MG: 1 TABLET ORAL at 08:46

## 2021-07-20 RX ADMIN — OXYCODONE HYDROCHLORIDE 10 MG: 5 TABLET ORAL at 00:13

## 2021-07-20 RX ADMIN — HYDROMORPHONE HYDROCHLORIDE 0.5 MG: 1 INJECTION, SOLUTION INTRAMUSCULAR; INTRAVENOUS; SUBCUTANEOUS at 14:56

## 2021-07-20 RX ADMIN — DOCUSATE SODIUM 50 MG AND SENNOSIDES 8.6 MG 1 TABLET: 8.6; 5 TABLET, FILM COATED ORAL at 20:30

## 2021-07-20 RX ADMIN — Medication 100 MG: at 08:46

## 2021-07-20 RX ADMIN — SUCRALFATE 1 G: 1 SUSPENSION ORAL at 16:03

## 2021-07-20 RX ADMIN — SUCRALFATE 1 G: 1 SUSPENSION ORAL at 20:30

## 2021-07-20 NOTE — PROGRESS NOTES
"[facility]    Medicine Progress Note - Hospitalist Service       Date of Admission:  7/18/2021    Summary: 36 year old female with a pertinent history of alcohol abuse, pancreatitis, alcoholic hepatitis and migraines, who presents to [facility] ED via walk-in for evaluation of abdominal pain and headache.     The patient reports epigastric abdominal pain that began day prior to admission and now radiates to her back. The pain feels like the \"worst heartburn ever\" and is exacerbated by \"everything\" but particularly with taking po. She reports associated nausea and had one episode of vomiting morning of admission. She has only been able to take sips of water and has not been able to eat. Patient reports a history of pancreatitis and feels like her symptoms today are similar to this. She has previously had to be admitted for pancreatitis. No diarrhea or constipation.      Patient reports gradual onset of a typical migraine day prior to admission for which she took a Maxalt. She believes this \"set off\" her abdominal pain. Patient developed another headache this afternoon when her abdominal pain started to get more severe. She reports throbbing pain at the base of her skull radiating to the left-side of her head.      Patient additionally believes she may have a UTI. She reports decreased urination and dysuria. Of note, she has an IUD in place. LMP was 1-2 months ago.    Assessment & Plan            Acute alcohol pancreatitis - persistent, still active, lipase rising. Consult GI to assess in dx and management    Alcoholic gastritis  Supportive management, pain and nausea control, IV hydration, sips of fluids    Alcohol abuse, intoxication  Patient was advised to completely quit drinking given transaminitis, hepatic steatosis and cytopenias   evaluation rule 25 assessment - patient declines SUDS referral  Monitor with CIWA - today is scoring 0    Hypoglycemia  Likely due to Alcohol " effects  D5 with normal saline  Check bs qid    Alcohol hepatitis  ,  -> lower today  Renal function within normal range  Check INR  Abdominal ultrasound to reevaluate hepatic steatosis    Acute cystitis without hematuria  Continue cephalexin  urine culture > 100K E. Coli, sensitivities pending    Leukopenia, thrombocytopenia  Improving  Likely temporary bone marrow suppression secondary to alcohol abuse    Migraines headache  Maxalt prn    8. Hypomagnesemia  Secondary to alcohol abuse  Replace per protocol       Diet: Clear Liquid Diet    DVT Prophylaxis: Low Risk/Ambulatory with no VTE prophylaxis indicated  Grullon Catheter: Not present  Central Lines: None  Code Status: Full Code      Disposition Plan   Expected discharge: 07/21/2021 recommended to prior living arrangement once pancreatitis resolving.     The patient's care was discussed with the Bedside Nurse and Patient.    Nakul Davis MD  Hospitalist Service  [facility]  Securely message with the Vocera Web Console (learn more here)  Text page via [email or web address] Paging/Directory      Risk Factors Present on Admission          # Hypocalcemia: Ca = 8.4 mg/dL (Ref range: 8.5 - 10.5 mg/dL) and/or iCa = N/A on admission, will replace as needed     # Thrombocytopenia: Plts = 75 10e3/uL (Ref range: 150 - 450 10e3/uL) on admission, will monitor for bleeding      ______________________________________________________________________    Interval History   Patient continues to have significant abdominal pain. Nausea slightly improved.    Data reviewed today: I reviewed all medications, new labs and imaging results over the last 24 hours.CT imaging pending    Physical Exam   Vital Signs: Temp: 98.3  F (36.8  C) Temp src: Oral BP: 122/86 Pulse: 71   Resp: 18 SpO2: 96 % O2 Device: None (Room air)    Weight: 120 lbs 14.4 oz  Constitutional: awake, alert, cooperative, no apparent distress, and appears stated age  ENT: normocepalic, without  obvious abnormality, atramatic  Respiratory: No increased work of breathing, good air exchange, clear to auscultation bilaterally, no crackles or wheezing  Cardiovascular: Normal apical impulse, regular rate and rhythm, normal S1 and S2, no S3 or S4, and no murmur noted  GI: normal bowel sounds, soft, non-distended and tenderness noted in the epigastric region  Skin: no bruising or bleeding, normal skin color, texture, turgor and no rashes  Musculoskeletal: There is no redness, warmth, or swelling of the joints.  Full range of motion noted.  Motor strength is 5 out of 5 all extremities bilaterally.  Tone is normal.  Neurologic: Awake, alert, oriented to name, place and time.  Cranial nerves II-XII are grossly intact.  Motor is 5 out of 5 bilaterally.    Neuropsychiatric: General: normal, calm and normal eye contact  Level of consciousness: alert / normal  Affect: normal    Data    Recent Labs   Lab 07/20/21  0737 07/20/21  0519 07/19/21  2235 07/19/21  0751 07/18/21 2156 07/18/21 2156   WBC  --   --   --  4.0  --  3.5*   HGB  --   --   --  11.0*  --  12.9   MCV  --   --   --  100  --  98   PLT  --   --   --  75*  --  91*   INR  --   --   --  1.15  --   --      --   --  137  --  139   POTASSIUM 4.4  --   --  4.8  --  4.1   CHLORIDE 109*  --   --  102  --  103   CO2 28  --   --  23  --  23   BUN 3*  --   --  9  --  7*   CR 0.74  --   --  0.67  --  0.62   ANIONGAP 4*  --   --  12  --  13   LANNY 8.4*  --   --  8.6  --  9.3    106 122 56*   < > 76   ALBUMIN 3.4*  --   --  3.5  --  3.9   PROTTOTAL 6.2  --   --  6.2  --  7.2   BILITOTAL 0.9  --   --  0.9  --  0.8   ALKPHOS 84  --   --  90  --  99   *  --   --  122*  --  147*   *  --   --  183*  --  262*   LIPASE 417*  --   --  340*  --  292*    < > = values in this interval not displayed.
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
## 2021-07-20 NOTE — PLAN OF CARE
Ongoing fluctuation in nausea; moderate this shift.  Emesis x1 (~400ml clear fluid) this evening after drinking larger amounts of water.  Previously was taking sips slowly during observation.  Nausea r/t alcohol withdrawal versus acute pancreatitis?  Zofran dosing increased to 8mg IV q 6 PRN, along with Compazine IV as well.  Patient has noted relief from theses medications.  Encouraged continuing sips of CL as tolerated.  Okay for saltine crackers.     Reports epigastric pain 5/10.  Improvement noted towards goal of 3/10 with PRN Dilaudid 1mg q3 as needed.    VS are stable.  CIWA scores <5.  Last reported acholic drink yesterday morning (7/18)  Patient reports having a mimosa.  No hypoglycemia.  Blood sugars are stable.  Burning on urination improving.    Kim Lea RN

## 2021-07-20 NOTE — PROGRESS NOTES
Care Management Follow Up    Length of Stay (days): 1    Expected Discharge Date: 07/21/2021     Concerns to be Addressed:       Patient plan of care discussed at interdisciplinary rounds: Yes    Anticipated Discharge Disposition:  Home      Anticipated Discharge Services:  Pt declined a need for services at discharge  Anticipated Discharge DME:      Patient/family educated on Medicare website which has current facility and service quality ratings:    Education Provided on the Discharge Plan:  NA  Patient/Family in Agreement with the Plan:      Referrals Placed by CM/SW:  none  Private pay costs discussed: Not applicable    Additional Information:  SW met with pt in pt room, introduced self. AIDET completed. Pt agreeable to visit. Pt declined to discuss alcohol use, stating she had just been having fun for the 4th of July. Pt stated she has no need for resources for alcohol use, and she has many resources already and has support from friends to maintain sobriety.  CM will be available for pt to provide SUDS/referral or resources should she request.       GRACIELA Christy

## 2021-07-20 NOTE — PLAN OF CARE
Problem: Alcohol Withdrawal  Goal: Alcohol Withdrawal Symptom Control  Outcome: Improving   CIWA score this AM is zero. No signs or sx of withdrawal seen.    Problem: Acute Neurologic Deterioration (Alcohol Withdrawal)  Goal: Optimal Neurologic Function  Outcome: Improving   No deterioration. Pt is alert and oriented. Withdrawal has improved.     Problem: Adult Inpatient Plan of Care  Goal: Optimal Comfort and Wellbeing  Outcome: No Change   Pt states has had an improvement with nausea. Pain remains an issue. MD made pt NPO today and is consulting with GI. Writer did reinforce that if she has nausea to not take anything by mouth. MD again changed diet to NPO.     Problem: Nutrition Impaired (Pancreatitis)  Goal: Optimal Nutrition Intake  Outcome: Declining   Back to NPO status.    Problem: Adult Inpatient Plan of Care  Goal: Readiness for Transition of Care  Outcome: No Change   Pt is not ready for discharge. GI now consulted.

## 2021-07-20 NOTE — PLAN OF CARE
Problem: Adult Inpatient Plan of Care  Goal: Plan of Care Review  Outcome: Improving  Goal: Patient-Specific Goal (Individualized)  Outcome: Improving  Goal: Absence of Hospital-Acquired Illness or Injury  Outcome: Improving  Intervention: Identify and Manage Fall Risk  Recent Flowsheet Documentation  Taken 7/20/2021 0447 by Brenton Vyas RN  Safety Promotion/Fall Prevention: activity supervised  Taken 7/20/2021 0013 by Brenton Vyas RN  Safety Promotion/Fall Prevention: activity supervised  Goal: Optimal Comfort and Wellbeing  Outcome: Improving  Goal: Readiness for Transition of Care  Outcome: Improving     Problem: Adult Inpatient Plan of Care  Goal: Optimal Comfort and Wellbeing  Outcome: Improving    Pt is having pain in abdomen that is managed with iv dilaudid and oxycodone.  Pt states ketorolac make her have emesis.      Pt was given Zofran and compazine for nausea.    Pt is up walking independently in room with no difficulty.      Pt is on CIWA scoring 4 for nausea.

## 2021-07-21 LAB
ALBUMIN SERPL-MCNC: 3.1 G/DL (ref 3.5–5)
ALP SERPL-CCNC: 85 U/L (ref 45–120)
ALT SERPL W P-5'-P-CCNC: 125 U/L (ref 0–45)
AMPHETAMINES UR QL SCN: ABNORMAL
AST SERPL W P-5'-P-CCNC: 211 U/L (ref 0–40)
BACTERIA UR CULT: ABNORMAL
BARBITURATES UR QL: ABNORMAL
BASOPHILS # BLD AUTO: 0 10E3/UL (ref 0–0.2)
BASOPHILS NFR BLD AUTO: 0 %
BENZODIAZ UR QL: ABNORMAL
BILIRUB DIRECT SERPL-MCNC: 0.3 MG/DL
BILIRUB SERPL-MCNC: 0.7 MG/DL (ref 0–1)
CANNABINOIDS UR QL SCN: ABNORMAL
COCAINE UR QL: ABNORMAL
CREAT UR-MCNC: 24 MG/DL
EOSINOPHIL # BLD AUTO: 0.1 10E3/UL (ref 0–0.7)
EOSINOPHIL NFR BLD AUTO: 4 %
ERYTHROCYTE [DISTWIDTH] IN BLOOD BY AUTOMATED COUNT: 15.1 % (ref 10–15)
FOLATE SERPL-MCNC: >20 NG/ML
GLUCOSE BLDC GLUCOMTR-MCNC: 112 MG/DL (ref 70–125)
GLUCOSE BLDC GLUCOMTR-MCNC: 83 MG/DL (ref 70–125)
GLUCOSE BLDC GLUCOMTR-MCNC: 87 MG/DL (ref 70–125)
GLUCOSE BLDC GLUCOMTR-MCNC: 99 MG/DL (ref 70–125)
HCT VFR BLD AUTO: 32.4 % (ref 35–47)
HGB BLD-MCNC: 10.8 G/DL (ref 11.7–15.7)
IMM GRANULOCYTES # BLD: 0 10E3/UL
IMM GRANULOCYTES NFR BLD: 0 %
INR PPP: 1.02 (ref 0.9–1.15)
LYMPHOCYTES # BLD AUTO: 0.9 10E3/UL (ref 0.8–5.3)
LYMPHOCYTES NFR BLD AUTO: 34 %
MAGNESIUM SERPL-MCNC: 1.9 MG/DL (ref 1.8–2.6)
MCH RBC QN AUTO: 33.9 PG (ref 26.5–33)
MCHC RBC AUTO-ENTMCNC: 33.3 G/DL (ref 31.5–36.5)
MCV RBC AUTO: 102 FL (ref 78–100)
MONOCYTES # BLD AUTO: 0.3 10E3/UL (ref 0–1.3)
MONOCYTES NFR BLD AUTO: 11 %
NEUTROPHILS # BLD AUTO: 1.3 10E3/UL (ref 1.6–8.3)
NEUTROPHILS NFR BLD AUTO: 51 %
NRBC # BLD AUTO: 0 10E3/UL
NRBC BLD AUTO-RTO: 0 /100
OPIATES UR QL SCN: ABNORMAL
OXYCODONE UR QL: ABNORMAL
PCP UR QL SCN: ABNORMAL
PLATELET # BLD AUTO: 79 10E3/UL (ref 150–450)
PROT SERPL-MCNC: 5.8 G/DL (ref 6–8)
RBC # BLD AUTO: 3.19 10E6/UL (ref 3.8–5.2)
VIT B12 SERPL-MCNC: 521 PG/ML (ref 213–816)
WBC # BLD AUTO: 2.6 10E3/UL (ref 4–11)

## 2021-07-21 PROCEDURE — 250N000013 HC RX MED GY IP 250 OP 250 PS 637: Performed by: FAMILY MEDICINE

## 2021-07-21 PROCEDURE — 82607 VITAMIN B-12: CPT | Performed by: PHYSICIAN ASSISTANT

## 2021-07-21 PROCEDURE — 99233 SBSQ HOSP IP/OBS HIGH 50: CPT | Performed by: FAMILY MEDICINE

## 2021-07-21 PROCEDURE — 120N000001 HC R&B MED SURG/OB

## 2021-07-21 PROCEDURE — 83735 ASSAY OF MAGNESIUM: CPT | Performed by: HOSPITALIST

## 2021-07-21 PROCEDURE — 80076 HEPATIC FUNCTION PANEL: CPT | Performed by: PHYSICIAN ASSISTANT

## 2021-07-21 PROCEDURE — 250N000011 HC RX IP 250 OP 636: Performed by: INTERNAL MEDICINE

## 2021-07-21 PROCEDURE — 80307 DRUG TEST PRSMV CHEM ANLYZR: CPT | Performed by: PHYSICIAN ASSISTANT

## 2021-07-21 PROCEDURE — 36415 COLL VENOUS BLD VENIPUNCTURE: CPT | Performed by: PHYSICIAN ASSISTANT

## 2021-07-21 PROCEDURE — 85610 PROTHROMBIN TIME: CPT | Performed by: PHYSICIAN ASSISTANT

## 2021-07-21 PROCEDURE — 82040 ASSAY OF SERUM ALBUMIN: CPT | Performed by: PHYSICIAN ASSISTANT

## 2021-07-21 PROCEDURE — 82746 ASSAY OF FOLIC ACID SERUM: CPT | Performed by: PHYSICIAN ASSISTANT

## 2021-07-21 PROCEDURE — C9113 INJ PANTOPRAZOLE SODIUM, VIA: HCPCS | Performed by: PHYSICIAN ASSISTANT

## 2021-07-21 PROCEDURE — 250N000013 HC RX MED GY IP 250 OP 250 PS 637: Performed by: INTERNAL MEDICINE

## 2021-07-21 PROCEDURE — 250N000011 HC RX IP 250 OP 636: Performed by: PHYSICIAN ASSISTANT

## 2021-07-21 PROCEDURE — 258N000003 HC RX IP 258 OP 636: Performed by: INTERNAL MEDICINE

## 2021-07-21 PROCEDURE — 85025 COMPLETE CBC W/AUTO DIFF WBC: CPT | Performed by: INTERNAL MEDICINE

## 2021-07-21 PROCEDURE — 250N000013 HC RX MED GY IP 250 OP 250 PS 637: Performed by: HOSPITALIST

## 2021-07-21 RX ORDER — ACETAMINOPHEN 325 MG/1
650 TABLET ORAL ONCE
Status: COMPLETED | OUTPATIENT
Start: 2021-07-21 | End: 2021-07-21

## 2021-07-21 RX ADMIN — CEPHALEXIN 500 MG: 500 CAPSULE ORAL at 17:34

## 2021-07-21 RX ADMIN — TRAZODONE HYDROCHLORIDE 50 MG: 50 TABLET ORAL at 00:19

## 2021-07-21 RX ADMIN — CEPHALEXIN 500 MG: 500 CAPSULE ORAL at 13:40

## 2021-07-21 RX ADMIN — PANTOPRAZOLE SODIUM 40 MG: 40 INJECTION, POWDER, FOR SOLUTION INTRAVENOUS at 13:40

## 2021-07-21 RX ADMIN — CEPHALEXIN 500 MG: 500 CAPSULE ORAL at 08:15

## 2021-07-21 RX ADMIN — SUCRALFATE 1 G: 1 SUSPENSION ORAL at 17:33

## 2021-07-21 RX ADMIN — LORAZEPAM 1 MG: 1 TABLET ORAL at 20:00

## 2021-07-21 RX ADMIN — Medication 1 TABLET: at 08:15

## 2021-07-21 RX ADMIN — DOCUSATE SODIUM 50 MG AND SENNOSIDES 8.6 MG 1 TABLET: 8.6; 5 TABLET, FILM COATED ORAL at 20:00

## 2021-07-21 RX ADMIN — PROCHLORPERAZINE EDISYLATE 10 MG: 5 INJECTION INTRAMUSCULAR; INTRAVENOUS at 03:27

## 2021-07-21 RX ADMIN — DEXTROSE AND SODIUM CHLORIDE: 5; 900 INJECTION, SOLUTION INTRAVENOUS at 00:29

## 2021-07-21 RX ADMIN — HYDROMORPHONE HYDROCHLORIDE 0.5 MG: 1 INJECTION, SOLUTION INTRAMUSCULAR; INTRAVENOUS; SUBCUTANEOUS at 08:25

## 2021-07-21 RX ADMIN — OXYCODONE HYDROCHLORIDE 5 MG: 5 TABLET ORAL at 05:46

## 2021-07-21 RX ADMIN — Medication 100 MG: at 08:15

## 2021-07-21 RX ADMIN — SUCRALFATE 1 G: 1 SUSPENSION ORAL at 10:30

## 2021-07-21 RX ADMIN — SUCRALFATE 1 G: 1 SUSPENSION ORAL at 07:30

## 2021-07-21 RX ADMIN — OXYCODONE HYDROCHLORIDE 5 MG: 5 TABLET ORAL at 18:38

## 2021-07-21 RX ADMIN — CEPHALEXIN 500 MG: 500 CAPSULE ORAL at 20:00

## 2021-07-21 RX ADMIN — OXYCODONE HYDROCHLORIDE 5 MG: 5 TABLET ORAL at 10:20

## 2021-07-21 RX ADMIN — HYDROMORPHONE HYDROCHLORIDE 0.5 MG: 1 INJECTION, SOLUTION INTRAMUSCULAR; INTRAVENOUS; SUBCUTANEOUS at 12:07

## 2021-07-21 RX ADMIN — OXYCODONE HYDROCHLORIDE 5 MG: 5 TABLET ORAL at 22:50

## 2021-07-21 RX ADMIN — OXYCODONE HYDROCHLORIDE 5 MG: 5 TABLET ORAL at 01:29

## 2021-07-21 RX ADMIN — SUCRALFATE 1 G: 1 SUSPENSION ORAL at 20:00

## 2021-07-21 RX ADMIN — Medication 5 MG: at 00:19

## 2021-07-21 RX ADMIN — ACETAMINOPHEN 650 MG: 325 TABLET ORAL at 13:47

## 2021-07-21 RX ADMIN — HYDROMORPHONE HYDROCHLORIDE 0.5 MG: 1 INJECTION, SOLUTION INTRAMUSCULAR; INTRAVENOUS; SUBCUTANEOUS at 03:21

## 2021-07-21 RX ADMIN — OXYCODONE HYDROCHLORIDE 5 MG: 5 TABLET ORAL at 14:37

## 2021-07-21 RX ADMIN — FOLIC ACID 1 MG: 1 TABLET ORAL at 08:15

## 2021-07-21 NOTE — PLAN OF CARE
Problem: Infection (Pancreatitis)  Goal: Infection Symptom Resolution  Outcome: No Change  Afebrile. On PO ABX for UTI.      Problem: Pain (Pancreatitis)  Goal: Acceptable Pain Control  Outcome: No Change  Received PRN Oxycodone, IV Dilaudid x2, and Tylenol x1 for this shift. Still complaining of moderate pain. Will continue to assess and intervene per protocol.     Problem: Nutrition Impaired (Pancreatitis)  Goal: Optimal Nutrition Intake  Outcome: Improving  Diet advanced per order. Nothing for nausea.     Problem: Alcohol Withdrawal  Goal: Alcohol Withdrawal Symptom Control  Outcome: Improving  CIWA 2. No hallucinations. VSS.

## 2021-07-21 NOTE — PLAN OF CARE
Problem: Infection (Pancreatitis)  Goal: Infection Symptom Resolution  Outcome: Improving     Problem: Pain (Pancreatitis)  Goal: Acceptable Pain Control  Outcome: Improving  Intervention: Monitor and Manage Pain  Recent Flowsheet Documentation  Taken 7/21/2021 0129 by Charo Infante RN  Pain Management Interventions:    medication (see MAR)    emotional support   Problem: Alcohol Withdrawal  Goal: Alcohol Withdrawal Symptom Control  Outcome: Improving  Pt. Pain managed by prn oxycodone x 2 and dilaudid x1, rated her pain 4 and 5/10, with some relief. Had given PRN melatonin and trazodone for sleeping. Scoring for CIWA 3 and 1, given prn compazine for c/o mild nausea, effective. On clear liquid diet, D5 and NS run continues 100 ml/hr.  and 83. Takes adequate fluid intake. Had apple juice this am.

## 2021-07-21 NOTE — PROGRESS NOTES
"[facility] PROGRESS NOTE      ADMIT DATE: 7/18/2021     FACILITY: [facility]    PCP: Yohana Garcia, [phone]    ASSESSMENT AND PLAN:   36 year old female with a pertinent history of alcohol abuse, pancreatitis, alcoholic hepatitis and migraines, who presents to [facility] ED via walk-in for evaluation of abdominal pain and headache.     The patient reports epigastric abdominal pain that began day prior to admission and now radiates to her back. The pain feels like the \"worst heartburn ever\" and is exacerbated by \"everything\" but particularly with taking po. She reports associated nausea and had one episode of vomiting morning of admission. She has only been able to take sips of water and has not been able to eat. Patient reports a history of pancreatitis and feels like her symptoms today are similar to this. She has previously had to be admitted for pancreatitis. No diarrhea or constipation.      Patient reports gradual onset of a typical migraine day prior to admission for which she took a Maxalt. She believes this \"set off\" her abdominal pain. Patient developed another headache this afternoon when her abdominal pain started to get more severe. She reports throbbing pain at the base of her skull radiating to the left-side of her head.      Patient additionally believes she may have a UTI. She reports decreased urination and dysuria. Of note, she has an IUD in place. LMP was 1-2 months ago.    Principal Problem:    Acute pancreatitis, unspecified complication status, unspecified pancreatitis type  Active Problems:    Urinary Tract Infection    Transaminitis    Alcohol abuse    Alcoholic intoxication with complication (H)                Acute alcohol pancreatitis - persistent, still active, lipase rising. Consulted GI -->suspects alcohol pancreatitis.   ADAT     Alcoholic gastritis  Supportive management, pain and nausea control, IV hydration, on full liquids right now (ADAT)      Alcohol " abuse, intoxication  Patient was advised to completely quit drinking given transaminitis, hepatic steatosis and cytopenias   evaluation rule 25 assessment - patient declines SUDS referral  Monitor with CIWA -  is scoring 0-3 today      Hypoglycemia  Likely due to Alcohol effects  Improving  On PO diet and ADAT. discontinue IV fluids.   Check bs qid for now     Alcohol hepatitis  ALT and AST elevated but stable right now  Renal function within normal range   INR 1.02 7/21/2021  Abdominal ultrasound showed hepatic steatosis     Acute cystitis without hematuria  Continue cephalexin  urine culture > 100K pansensitive E. Coli-->c/w keflex     Leukopenia, thrombocytopenia  Likely temporary bone marrow suppression secondary to alcohol abuse     Migraines headache  Maxalt prn  Avoid NSAIDs per GI     Hypomagnesemia  Secondary to alcohol abuse  Replace per protocol        FEN/GI: full liquid diet for now, ADAT  DVT proph: ambulation, SCDs   Code status: Full Code      Discharge barriers:  -ADAT  -ADOD: tomorrow      SUBJECTIVE:    Patient states she was able to hold down the full liquids she had for breakfast but had some nausea afterwards. She would like to try full liquids for lunch again. She states her abdominal pain is improving. She is having regular BMs. She denies any other complaints at this time.       ROS:  12 Points review of systems reviewed and is negative except for what has already been mentioned above    OBJECTIVE:  Patient Vitals for the past 24 hrs:   BP Temp Temp src Pulse Resp SpO2   07/21/21 0400 137/84 98.4  F (36.9  C) Oral 70 18 97 %   07/21/21 0033 (!) 151/88 98.4  F (36.9  C) Oral 77 18 96 %   07/20/21 1744 (!) 142/96 98  F (36.7  C) Oral 74 18 94 %   07/20/21 0742 122/86 98.3  F (36.8  C) Oral 71 18 96 %          Intake/Output Summary (Last 24 hours) at 7/21/2021 0715  Last data filed at 7/20/2021 2120  Gross per 24 hour   Intake 4022 ml   Output 1625 ml   Net 2397 ml     GENRL: Alert  and oriented X 3. Not in acute distress. Satting at 98% on room air.   HEENT: NC/AT      Neck- supple      Sclera- anicteric      Mucous membrane- moist and pink  CHEST: Clear to auscultation bilaterally  HEART: S1S2 regular. No murmurs, rubs or gallops  ABDMN: Soft. Non-tender on mild palpation. No guarding or rigidity. Bowel sounds- active  EXTRM: Extremities are warm and non-tender. No pedal edema.   NEURO: No involuntary movements  INTGM: please see nursing assessment for full skin assessment  PULSES: 2+ and symmetric all extremities  PSYCH: normal affect, normal speech     DIAGNOSTIC DATA:          Recent Results (from the past 24 hour(s))   Magnesium    Collection Time: 07/20/21  7:37 AM   Result Value Ref Range    Magnesium 1.9 1.8 - 2.6 mg/dL   Hepatic panel    Collection Time: 07/20/21  7:37 AM   Result Value Ref Range    Bilirubin Total 0.9 0.0 - 1.0 mg/dL    Bilirubin Direct 0.4 <=0.5 mg/dL    Protein Total 6.2 6.0 - 8.0 g/dL    Albumin 3.4 (L) 3.5 - 5.0 g/dL    Alkaline Phosphatase 84 45 - 120 U/L     (H) 0 - 40 U/L     (H) 0 - 45 U/L   Basic metabolic panel    Collection Time: 07/20/21  7:37 AM   Result Value Ref Range    Sodium 141 136 - 145 mmol/L    Potassium 4.4 3.5 - 5.0 mmol/L    Chloride 109 (H) 98 - 107 mmol/L    Carbon Dioxide (CO2) 28 22 - 31 mmol/L    Anion Gap 4 (L) 5 - 18 mmol/L    Urea Nitrogen 3 (L) 8 - 22 mg/dL    Creatinine 0.74 0.60 - 1.10 mg/dL    Calcium 8.4 (L) 8.5 - 10.5 mg/dL    Glucose 106 70 - 125 mg/dL    GFR Estimate >90 >60 mL/min/1.73m2   Lipase    Collection Time: 07/20/21  7:37 AM   Result Value Ref Range    Lipase 417 (H) 0 - 52 U/L   Glucose by meter    Collection Time: 07/20/21  6:08 PM   Result Value Ref Range    GLUCOSE BY METER POCT 127 (H) 70 - 125 mg/dL   Glucose by meter    Collection Time: 07/21/21 12:22 AM   Result Value Ref Range    GLUCOSE BY METER POCT 112 70 - 125 mg/dL   Glucose by meter    Collection Time: 07/21/21  5:49 AM   Result Value Ref  Range    GLUCOSE BY METER POCT 83 70 - 125 mg/dL        Results for orders placed or performed during the hospital encounter of 07/18/21   US Abdomen Limited    Impression    IMPRESSION:  1.  Sludge in the gallbladder. No specific evidence for cholecystitis.  2.  Hepatic steatosis.       CT Abdomen Pelvis w Contrast    Impression    IMPRESSION:   1.  Subtle enhancement of right ureter raises question of ureteral inflammation, no CT evidence for pyelonephritis.  2.  Air-fluid level within the bladder, presumably from recent catheterization. Minimal bladder wall thickening.  3.  Resolution of peripancreatic edema.  4.  Prominent fatty infiltration of liver unchanged.   XR Chest 2 Views    Impression    IMPRESSION: Lungs are clear. No effusions. Heart and pulmonary vascularity are normal. No signs of acute disease.          All recent labs reviewed personally  Radiology report reviewed.         The total time spent in preparing this progress note is about 40 minutes, >50% time spent in care co-ordination that includes reviewing labs, images, discussing the plan of care with patient/family, consultants, and .      Kevin Arthur MD.   [facility]   [phone]   Pager [phone]

## 2021-07-21 NOTE — PROGRESS NOTES
Patient is getting Oxycodone and IV Dilaudid for pain. Discussed the importance of only getting PO pain medications if she has to go home today. Patient agreed with the plan. Will notify the provider.

## 2021-07-21 NOTE — PLAN OF CARE
CIWA scoring 1 and 0 for mild nausea. Pain is still moderate, PRNs provide relief. Pt up independently. CXR and Abd CT done. IVF infusing. Tolerating clears.

## 2021-07-21 NOTE — PROGRESS NOTES
"[facility] Progress Note       SUBJECTIVE:    Feels better today with less abdominal pain. Hopes to advance to full liquids. No diarrhea, nausea, or vomiting.   Reports urinary symptoms (dysuria) has improved since admission.   OBJECTIVE:  /89 (BP Location: Left arm)   Pulse 58   Temp 98  F (36.7  C) (Oral)   Resp 16   Ht 1.6 m (5' 3\")   Wt 54.8 kg (120 lb 14.4 oz)   SpO2 98%   BMI 21.42 kg/m    Temp (24hrs), Av.2  F (36.8  C), Min:98  F (36.7  C), Max:98.4  F (36.9  C)      PHYSICAL EXAM  GEN: NAD, responds appropriately  HRT: S1S2  LUNGS: Clear  ABD: +bs, soft, not tender  SKIN: No rash, jaundice      Additional Data:  I have reviewed the patient's new clinical lab results:     Recent Labs   Lab Test 21  0834 21  07521  0741   WBC 2.6* 4.0 3.5* 3.0*   HGB 10.8* 11.0* 12.9 10.2*   * 100 98 101*   PLT 79* 75* 91* 93*   INR 1.02 1.15  --  1.20*     Recent Labs   Lab Test 21   POTASSIUM 4.4 4.8 4.1   CHLORIDE 109* 102 103   CO2 28 23 23   BUN 3* 9 7*   ANIONGAP 4* 12 13     Recent Labs   Lab Test 21  0834 21  0737 21  1749 20  0741 20  1039   ALBUMIN 3.1* 3.4* 3.5 3.9  --   --   --    BILITOTAL 0.7 0.9 0.9 0.8  --   --   --    * 101* 122* 147*  --   --   --    * 127* 183* 262*  --   --   --    PROTEIN  --   --   --  Negative Negative  --  Negative   LIPASE  --  417* 340* 292*  --    < >  --     < > = values in this interval not displayed.     EXAM: CT ABDOMEN PELVIS W CONTRAST  LOCATION: [facility]   DATE/TIME: 2021 8:39 PM     INDICATION: Epigastric pain  COMPARISON: Ultrasound 2021. CT 9/10/2020  TECHNIQUE: CT scan of the abdomen and pelvis was performed following injection of IV contrast. Multiplanar reformats were obtained. Dose reduction techniques were used.  CONTRAST: isovue 370 " 100ml     FINDINGS:   LOWER CHEST: Normal.     HEPATOBILIARY: Prominent diffuse fatty infiltration of liver unchanged.     PANCREAS: Normal. Resolution of previous peripancreatic inflammation.     SPLEEN: Normal.     ADRENAL GLANDS: Normal.     KIDNEYS/BLADDER: Normal appearance of kidneys, no stones or hydronephrosis. There does appear to be subtle enhancement of the uroepithelium involving proximal right ureter, no ureteral stone.     There is a large air-fluid level within the bladder, presumably from recent catheterization. There does appear to be mild bladder wall thickening. No bladder stones.     BOWEL: No obstruction or inflammatory change. Appendix normal.     LYMPH NODES: Normal.     VASCULATURE: Unremarkable.     PELVIC ORGANS: IUD in good position. No adnexal lesions. Small volume of free pelvic fluid likely physiologic.     MUSCULOSKELETAL: Normal.                                                                      IMPRESSION:   1.  Subtle enhancement of right ureter raises question of ureteral inflammation, no CT evidence for pyelonephritis.  2.  Air-fluid level within the bladder, presumably from recent catheterization. Minimal bladder wall thickening.  3.  Resolution of peripancreatic edema.  4.  Prominent fatty infiltration of liver unchanged.    EXAM: US ABDOMEN LIMITED  LOCATION: [facility]  DATE/TIME: 7/19/2021 4:19 AM     INDICATION: abd pain, pancreatitis  COMPARISON: Abdominal ultrasound 09/10/2020. CT abdomen and pelvis 09/10/2020.  TECHNIQUE: Limited abdominal ultrasound.     FINDINGS:     GALLBLADDER: Small amount of sludge in the gallbladder. No significant wall thickening. No pericholecystic fluid. Negative ultrasound Hackett sign.     BILE DUCTS: No biliary dilatation. The common duct measures 4 mm.     LIVER: Echogenic parenchyma. No focal mass.     RIGHT KIDNEY: No hydronephrosis.     PANCREAS: The visualized portions are normal.     No  ascites.                                                                      IMPRESSION:  1.  Sludge in the gallbladder. No specific evidence for cholecystitis.  2.  Hepatic steatosis.  IMPRESSION:  Upper abdominal pain- suspected alcohol pancreatitis at admission with lipase in the 400s. CT abdomen and pelvis 7/20/21 did not show acute inflammation involving the pancreas.   Gastritis/PUD secondary to alcohol, frequent nsaids, and recent cigarette smoking (quit 7 days ago) is possible, but not likely to cause severe pain. No evidence to support gi bleeding. Suspect drop in Hgb is related to generous iv hydration. The patient's pain seems to be improving.       UTI- on abx per primary team. Urine cx with >100K e. Coli. Subtle enhancement of the right ureter on CT of ?significance. Defer to primary team.   Abnormal liver tests- Suspect alcohol hepatitis. US shows hepatic steatosis. Portal htn was considered given thrombocytopenia, but CT does not show findings of cirrhosis/portal htn. Abdominal ultrasound does not show findings of cholecystitis or cbd dilation to suggest biliary obstruction.   Acute alcohol intoxication at admission with long history of alcohol abuse- the patient is encouraged to stop drinking alcohol.  Pancytopenia- suspect Bone marrow sup. From alcohol abuse vs. ?Viral illness   Migraine headaches- recommend avoiding nsaids if possible      This 37yo female with history of migraine headaches, alcohol abuse, tobacco abuse (quit 7 days ago), and pancreatitis (presumed secondary to etoh) as documented by CT 6/2020 and 9/2020 who presented with a couple week history of upper abdominal pain and vomiting. Her symptoms coincide with frequent nsaid use as well as alcohol abuse. Gastritis/pud is certainly possible in setting of frequent nsaids, alcohol, and tobacco. Endoscopic eval not warranted as there is no report of wilner gi bleeding.   Lipase was elevated to 417 7/20/21 and I suspect acute pancreatitis  and especially given her previous bouts in 6/2020 and 9/2020.   Liver test abnormalities are noted and suspect this is consistent with alcohol abuse/alcohol hepatitis. Abdominal ultrasound shows hepatic steatosis without signs of cholecystitis or bile duct dilation.        RECOMMENDATIONS:  Advance to full liquid diet and further advance as tolerated (soft low fat).  UTI treatment per primary team.   Monitor for etoh withdrawal. Urine tox screen.   Continue ppi.   She is encouraged to avoid alcohol and tobacco.  Avoid NSAIDs.  I will check vitamin B12 and folate ().   I will arrange for outpatient follow up in hepatology clinic.   Hortencia Castillo PA-C  [facility]  7/21/2021 9:15 AM  [phone] (office)

## 2021-07-22 LAB
ALBUMIN SERPL-MCNC: 3.1 G/DL (ref 3.5–5)
ALP SERPL-CCNC: 95 U/L (ref 45–120)
ALT SERPL W P-5'-P-CCNC: 216 U/L (ref 0–45)
ALT SERPL W P-5'-P-CCNC: 234 U/L (ref 0–45)
ANION GAP SERPL CALCULATED.3IONS-SCNC: 7 MMOL/L (ref 5–18)
AST SERPL W P-5'-P-CCNC: 399 U/L (ref 0–40)
AST SERPL W P-5'-P-CCNC: 432 U/L (ref 0–40)
BILIRUB SERPL-MCNC: 0.8 MG/DL (ref 0–1)
BUN SERPL-MCNC: 3 MG/DL (ref 8–22)
CALCIUM SERPL-MCNC: 8.5 MG/DL (ref 8.5–10.5)
CHLORIDE BLD-SCNC: 108 MMOL/L (ref 98–107)
CO2 SERPL-SCNC: 25 MMOL/L (ref 22–31)
CREAT SERPL-MCNC: 0.63 MG/DL (ref 0.6–1.1)
ERYTHROCYTE [DISTWIDTH] IN BLOOD BY AUTOMATED COUNT: 14.6 % (ref 10–15)
GFR SERPL CREATININE-BSD FRML MDRD: >90 ML/MIN/1.73M2
GLUCOSE BLD-MCNC: 75 MG/DL (ref 70–125)
GLUCOSE BLDC GLUCOMTR-MCNC: 102 MG/DL (ref 70–125)
GLUCOSE BLDC GLUCOMTR-MCNC: 115 MG/DL (ref 70–125)
GLUCOSE BLDC GLUCOMTR-MCNC: 78 MG/DL (ref 70–125)
HCT VFR BLD AUTO: 33.6 % (ref 35–47)
HGB BLD-MCNC: 11.4 G/DL (ref 11.7–15.7)
LIPASE SERPL-CCNC: 110 U/L (ref 0–52)
MAGNESIUM SERPL-MCNC: 1.9 MG/DL (ref 1.8–2.6)
MCH RBC QN AUTO: 33.9 PG (ref 26.5–33)
MCHC RBC AUTO-ENTMCNC: 33.9 G/DL (ref 31.5–36.5)
MCV RBC AUTO: 100 FL (ref 78–100)
PLATELET # BLD AUTO: 89 10E3/UL (ref 150–450)
POTASSIUM BLD-SCNC: 4 MMOL/L (ref 3.5–5)
PROT SERPL-MCNC: 6 G/DL (ref 6–8)
RBC # BLD AUTO: 3.36 10E6/UL (ref 3.8–5.2)
SODIUM SERPL-SCNC: 140 MMOL/L (ref 136–145)
WBC # BLD AUTO: 3.4 10E3/UL (ref 4–11)

## 2021-07-22 PROCEDURE — 85027 COMPLETE CBC AUTOMATED: CPT | Performed by: FAMILY MEDICINE

## 2021-07-22 PROCEDURE — C9113 INJ PANTOPRAZOLE SODIUM, VIA: HCPCS | Performed by: PHYSICIAN ASSISTANT

## 2021-07-22 PROCEDURE — 250N000013 HC RX MED GY IP 250 OP 250 PS 637: Performed by: CLINICAL NURSE SPECIALIST

## 2021-07-22 PROCEDURE — 99222 1ST HOSP IP/OBS MODERATE 55: CPT | Performed by: CLINICAL NURSE SPECIALIST

## 2021-07-22 PROCEDURE — 250N000013 HC RX MED GY IP 250 OP 250 PS 637: Performed by: FAMILY MEDICINE

## 2021-07-22 PROCEDURE — 250N000011 HC RX IP 250 OP 636: Performed by: INTERNAL MEDICINE

## 2021-07-22 PROCEDURE — 80053 COMPREHEN METABOLIC PANEL: CPT | Performed by: FAMILY MEDICINE

## 2021-07-22 PROCEDURE — 36415 COLL VENOUS BLD VENIPUNCTURE: CPT | Performed by: FAMILY MEDICINE

## 2021-07-22 PROCEDURE — 250N000013 HC RX MED GY IP 250 OP 250 PS 637: Performed by: HOSPITALIST

## 2021-07-22 PROCEDURE — 84460 ALANINE AMINO (ALT) (SGPT): CPT | Performed by: FAMILY MEDICINE

## 2021-07-22 PROCEDURE — 120N000001 HC R&B MED SURG/OB

## 2021-07-22 PROCEDURE — 84450 TRANSFERASE (AST) (SGOT): CPT | Performed by: FAMILY MEDICINE

## 2021-07-22 PROCEDURE — 83690 ASSAY OF LIPASE: CPT | Performed by: FAMILY MEDICINE

## 2021-07-22 PROCEDURE — 99233 SBSQ HOSP IP/OBS HIGH 50: CPT | Performed by: FAMILY MEDICINE

## 2021-07-22 PROCEDURE — 250N000011 HC RX IP 250 OP 636: Performed by: PHYSICIAN ASSISTANT

## 2021-07-22 PROCEDURE — 250N000013 HC RX MED GY IP 250 OP 250 PS 637: Performed by: INTERNAL MEDICINE

## 2021-07-22 PROCEDURE — 83735 ASSAY OF MAGNESIUM: CPT | Performed by: FAMILY MEDICINE

## 2021-07-22 RX ORDER — OXYCODONE HYDROCHLORIDE 5 MG/1
5 TABLET ORAL EVERY 4 HOURS PRN
Status: DISCONTINUED | OUTPATIENT
Start: 2021-07-22 | End: 2021-07-22

## 2021-07-22 RX ORDER — POLYETHYLENE GLYCOL 3350 17 G/17G
17 POWDER, FOR SOLUTION ORAL DAILY
Status: DISCONTINUED | OUTPATIENT
Start: 2021-07-22 | End: 2021-07-22

## 2021-07-22 RX ORDER — MULTIPLE VITAMINS W/ MINERALS TAB 9MG-400MCG
1 TAB ORAL DAILY
Qty: 30 TABLET | Refills: 0 | Status: SHIPPED | OUTPATIENT
Start: 2021-07-23 | End: 2021-09-13

## 2021-07-22 RX ORDER — FOLIC ACID 1 MG/1
1 TABLET ORAL DAILY
Qty: 30 TABLET | Refills: 0 | Status: SHIPPED | OUTPATIENT
Start: 2021-07-23 | End: 2021-09-13

## 2021-07-22 RX ORDER — HYDROMORPHONE HYDROCHLORIDE 2 MG/1
2 TABLET ORAL EVERY 4 HOURS PRN
Status: DISCONTINUED | OUTPATIENT
Start: 2021-07-22 | End: 2021-07-24 | Stop reason: HOSPADM

## 2021-07-22 RX ORDER — TRAZODONE HYDROCHLORIDE 50 MG/1
50 TABLET, FILM COATED ORAL
Qty: 30 TABLET | Refills: 0 | Status: SHIPPED | OUTPATIENT
Start: 2021-07-22 | End: 2021-09-13

## 2021-07-22 RX ORDER — SUCRALFATE ORAL 1 G/10ML
1 SUSPENSION ORAL
Qty: 120 ML | Refills: 0 | Status: SHIPPED | OUTPATIENT
Start: 2021-07-22 | End: 2021-08-15

## 2021-07-22 RX ORDER — HYDROMORPHONE HYDROCHLORIDE 2 MG/1
2-4 TABLET ORAL EVERY 6 HOURS PRN
Qty: 10 TABLET | Refills: 0 | Status: SHIPPED | OUTPATIENT
Start: 2021-07-22 | End: 2021-07-24

## 2021-07-22 RX ORDER — MAGNESIUM HYDROXIDE/ALUMINUM HYDROXICE/SIMETHICONE 120; 1200; 1200 MG/30ML; MG/30ML; MG/30ML
30 SUSPENSION ORAL EVERY 4 HOURS PRN
Qty: 100 ML | Refills: 0 | Status: SHIPPED | OUTPATIENT
Start: 2021-07-22 | End: 2021-09-13

## 2021-07-22 RX ORDER — POLYETHYLENE GLYCOL 3350 17 G/17G
17 POWDER, FOR SOLUTION ORAL DAILY PRN
Status: DISCONTINUED | OUTPATIENT
Start: 2021-07-22 | End: 2021-07-23

## 2021-07-22 RX ORDER — CALCIUM CARBONATE 500 MG/1
1 TABLET, CHEWABLE ORAL DAILY PRN
Qty: 30 TABLET | Refills: 0 | Status: SHIPPED | OUTPATIENT
Start: 2021-07-22

## 2021-07-22 RX ORDER — HYDROXYZINE HYDROCHLORIDE 25 MG/1
25 TABLET, FILM COATED ORAL EVERY 6 HOURS PRN
Status: DISCONTINUED | OUTPATIENT
Start: 2021-07-22 | End: 2021-07-23

## 2021-07-22 RX ORDER — BISACODYL 10 MG
10 SUPPOSITORY, RECTAL RECTAL DAILY PRN
Status: DISCONTINUED | OUTPATIENT
Start: 2021-07-22 | End: 2021-07-24 | Stop reason: HOSPADM

## 2021-07-22 RX ORDER — ONDANSETRON 4 MG/1
4 TABLET, ORALLY DISINTEGRATING ORAL EVERY 6 HOURS PRN
Qty: 30 TABLET | Refills: 0 | Status: SHIPPED | OUTPATIENT
Start: 2021-07-22 | End: 2021-08-15

## 2021-07-22 RX ORDER — KETOROLAC TROMETHAMINE 30 MG/ML
30 INJECTION, SOLUTION INTRAMUSCULAR; INTRAVENOUS ONCE
Status: COMPLETED | OUTPATIENT
Start: 2021-07-22 | End: 2021-07-22

## 2021-07-22 RX ORDER — LANOLIN ALCOHOL/MO/W.PET/CERES
100 CREAM (GRAM) TOPICAL DAILY
Qty: 30 TABLET | Refills: 0 | Status: SHIPPED | OUTPATIENT
Start: 2021-07-23 | End: 2021-08-15

## 2021-07-22 RX ORDER — LEVOFLOXACIN 250 MG/1
250 TABLET, FILM COATED ORAL DAILY
Status: DISCONTINUED | OUTPATIENT
Start: 2021-07-22 | End: 2021-07-24 | Stop reason: HOSPADM

## 2021-07-22 RX ORDER — AMOXICILLIN 250 MG
1 CAPSULE ORAL AT BEDTIME
Qty: 30 TABLET | Refills: 0 | Status: SHIPPED | OUTPATIENT
Start: 2021-07-22 | End: 2021-08-15

## 2021-07-22 RX ORDER — HYDROMORPHONE HYDROCHLORIDE 2 MG/1
2-4 TABLET ORAL EVERY 6 HOURS PRN
Status: DISCONTINUED | OUTPATIENT
Start: 2021-07-22 | End: 2021-07-22

## 2021-07-22 RX ORDER — SACCHAROMYCES BOULARDII 250 MG
250 CAPSULE ORAL 2 TIMES DAILY
Qty: 30 CAPSULE | Refills: 0 | Status: SHIPPED | OUTPATIENT
Start: 2021-07-22 | End: 2021-07-24

## 2021-07-22 RX ORDER — CEPHALEXIN 500 MG/1
500 CAPSULE ORAL 4 TIMES DAILY
Qty: 28 CAPSULE | Refills: 0 | Status: SHIPPED | OUTPATIENT
Start: 2021-07-22 | End: 2021-07-24

## 2021-07-22 RX ADMIN — LEVOFLOXACIN 250 MG: 250 TABLET, FILM COATED ORAL at 17:42

## 2021-07-22 RX ADMIN — PANTOPRAZOLE SODIUM 40 MG: 40 INJECTION, POWDER, FOR SOLUTION INTRAVENOUS at 13:42

## 2021-07-22 RX ADMIN — Medication 5 MG: at 22:07

## 2021-07-22 RX ADMIN — CEPHALEXIN 500 MG: 500 CAPSULE ORAL at 08:45

## 2021-07-22 RX ADMIN — SUCRALFATE 1 G: 1 SUSPENSION ORAL at 17:10

## 2021-07-22 RX ADMIN — HYDROMORPHONE HYDROCHLORIDE 2 MG: 2 TABLET ORAL at 09:27

## 2021-07-22 RX ADMIN — SUCRALFATE 1 G: 1 SUSPENSION ORAL at 20:29

## 2021-07-22 RX ADMIN — Medication 100 MG: at 08:45

## 2021-07-22 RX ADMIN — Medication 5 MG: at 00:26

## 2021-07-22 RX ADMIN — SUCRALFATE 1 G: 1 SUSPENSION ORAL at 08:45

## 2021-07-22 RX ADMIN — BISACODYL SUPPOSITORY 10 MG: 10 SUPPOSITORY RECTAL at 21:21

## 2021-07-22 RX ADMIN — OXYCODONE HYDROCHLORIDE 5 MG: 5 TABLET ORAL at 13:42

## 2021-07-22 RX ADMIN — HYDROMORPHONE HYDROCHLORIDE 2 MG: 2 TABLET ORAL at 22:08

## 2021-07-22 RX ADMIN — CEPHALEXIN 500 MG: 500 CAPSULE ORAL at 12:53

## 2021-07-22 RX ADMIN — HYDROMORPHONE HYDROCHLORIDE 0.5 MG: 1 INJECTION, SOLUTION INTRAMUSCULAR; INTRAVENOUS; SUBCUTANEOUS at 00:25

## 2021-07-22 RX ADMIN — KETOROLAC TROMETHAMINE 30 MG: 30 INJECTION, SOLUTION INTRAMUSCULAR; INTRAVENOUS at 23:15

## 2021-07-22 RX ADMIN — Medication 1 TABLET: at 08:45

## 2021-07-22 RX ADMIN — TRAZODONE HYDROCHLORIDE 50 MG: 50 TABLET ORAL at 22:08

## 2021-07-22 RX ADMIN — DOCUSATE SODIUM 50 MG AND SENNOSIDES 8.6 MG 1 TABLET: 8.6; 5 TABLET, FILM COATED ORAL at 22:07

## 2021-07-22 RX ADMIN — TRAZODONE HYDROCHLORIDE 50 MG: 50 TABLET ORAL at 00:05

## 2021-07-22 RX ADMIN — HYDROXYZINE HYDROCHLORIDE 25 MG: 25 TABLET, FILM COATED ORAL at 20:29

## 2021-07-22 RX ADMIN — FOLIC ACID 1 MG: 1 TABLET ORAL at 08:45

## 2021-07-22 RX ADMIN — HYDROMORPHONE HYDROCHLORIDE 2 MG: 2 TABLET ORAL at 17:11

## 2021-07-22 RX ADMIN — HYDROXYZINE HYDROCHLORIDE 25 MG: 25 TABLET, FILM COATED ORAL at 13:42

## 2021-07-22 NOTE — PROGRESS NOTES
"[facility] Progress Note       SUBJECTIVE:    Feels okay. Ate a turkey sandwich last night without gi distress or return of pain. However, does report having return of left-sided abdominal pain this AM after eating.      OBJECTIVE:  BP (!) 114/98 (BP Location: Left arm)   Pulse 57   Temp 98.3  F (36.8  C) (Oral)   Resp 18   Ht 1.6 m (5' 3\")   Wt 54.8 kg (120 lb 14.4 oz)   SpO2 98%   BMI 21.42 kg/m    Temp (24hrs), Av.2  F (36.8  C), Min:98  F (36.7  C), Max:98.3  F (36.8  C)    PHYSICAL EXAM  GEN: Sleeping. Awakens and responds appropriately  HRT: S1S2  LUNGS: clear  ABD: +bs, soft, not tender  SKIN: No rash, jaundice      Additional Data:  I have reviewed the patient's new clinical lab results:     Recent Labs   Lab Test 21  0821  0834 21  07520  0719 20  0741   WBC 3.4* 2.6* 4.0   < > 3.0*   HGB 11.4* 10.8* 11.0*  --  10.2*    102* 100  --  101*   PLT 89* 79* 75*  --  93*   INR  --  1.02 1.15  --  1.20*    < > = values in this interval not displayed.     Recent Labs   Lab Test 21  0821  0737 21  0751   POTASSIUM 4.0 4.4 4.8   CHLORIDE 108* 109* 102   CO2 25 28 23   BUN 3* 3* 9   ANIONGAP 7 4* 12     Recent Labs   Lab Test 21  0812 21  0834 21  0737 21  0751 21  2156 21  1749 20  0741 20  1039   ALBUMIN 3.1* 3.1* 3.4* 3.5 3.9  --   --   --    BILITOTAL 0.8 0.7 0.9 0.9 0.8  --   --   --    * 125* 101* 122* 147*  --   --   --    * 211* 127* 183* 262*  --   --   --    PROTEIN  --   --   --   --  Negative Negative  --  Negative   LIPASE  --   --  417* 340* 292*  --    < >  --     < > = values in this interval not displayed.     Tox screen +opiates  Vitamin B12 and folate are within acceptable range.     EXAM: CT ABDOMEN PELVIS W CONTRAST  LOCATION: [facility]   DATE/TIME: 2021 8:39 PM     INDICATION: Epigastric pain  COMPARISON: Ultrasound " 7/19/2021. CT 9/10/2020  TECHNIQUE: CT scan of the abdomen and pelvis was performed following injection of IV contrast. Multiplanar reformats were obtained. Dose reduction techniques were used.  CONTRAST: isovue 370 100ml     FINDINGS:   LOWER CHEST: Normal.     HEPATOBILIARY: Prominent diffuse fatty infiltration of liver unchanged.     PANCREAS: Normal. Resolution of previous peripancreatic inflammation.     SPLEEN: Normal.     ADRENAL GLANDS: Normal.     KIDNEYS/BLADDER: Normal appearance of kidneys, no stones or hydronephrosis. There does appear to be subtle enhancement of the uroepithelium involving proximal right ureter, no ureteral stone.     There is a large air-fluid level within the bladder, presumably from recent catheterization. There does appear to be mild bladder wall thickening. No bladder stones.     BOWEL: No obstruction or inflammatory change. Appendix normal.     LYMPH NODES: Normal.     VASCULATURE: Unremarkable.     PELVIC ORGANS: IUD in good position. No adnexal lesions. Small volume of free pelvic fluid likely physiologic.     MUSCULOSKELETAL: Normal.                                                                      IMPRESSION:   1.  Subtle enhancement of right ureter raises question of ureteral inflammation, no CT evidence for pyelonephritis.  2.  Air-fluid level within the bladder, presumably from recent catheterization. Minimal bladder wall thickening.  3.  Resolution of peripancreatic edema.  4.  Prominent fatty infiltration of liver unchanged.     EXAM: US ABDOMEN LIMITED  LOCATION: [facility]  DATE/TIME: 7/19/2021 4:19 AM     INDICATION: abd pain, pancreatitis  COMPARISON: Abdominal ultrasound 09/10/2020. CT abdomen and pelvis 09/10/2020.  TECHNIQUE: Limited abdominal ultrasound.     FINDINGS:     GALLBLADDER: Small amount of sludge in the gallbladder. No significant wall thickening. No pericholecystic fluid. Negative ultrasound Hackett sign.     BILE DUCTS: No biliary  dilatation. The common duct measures 4 mm.     LIVER: Echogenic parenchyma. No focal mass.     RIGHT KIDNEY: No hydronephrosis.     PANCREAS: The visualized portions are normal.     No ascites.                                                                      IMPRESSION:  1.  Sludge in the gallbladder. No specific evidence for cholecystitis.  2.  Hepatic steatosis.    IMPRESSION:  Upper abdominal pain- suspected alcohol pancreatitis at admission with lipase in the 400s. CT abdomen and pelvis 7/20/21 did not show acute inflammation involving the pancreas.   Gastritis/PUD secondary to alcohol, frequent nsaids, and recent cigarette smoking (quit 7 days ago) is possible, but not likely to cause severe pain. No evidence to support gi bleeding. Suspect drop in Hgb is related to generous iv hydration (hgb is up today). The patient's pain seems to be improving as she tolerated a soft diet last night.         UTI- on abx per primary team. Urine cx with >100K e. Coli. Subtle enhancement of the right ureter on CT of ?significance. Defer to primary team.   Abnormal liver tests- Suspect alcohol hepatitis. US shows hepatic steatosis. Portal htn was considered given thrombocytopenia, but CT does not show findings of cirrhosis/portal htn. Abdominal ultrasound does not show findings of cholecystitis or cbd dilation to suggest biliary obstruction.   Acute alcohol intoxication at admission with long history of alcohol abuse- the patient is encouraged to stop drinking alcohol.  Pancytopenia- suspect Bone marrow sup. From alcohol abuse vs. ?Viral illness. WBC and platelets are up slightly today   Migraine headaches- recommend avoiding nsaids if possible      This 35yo female with history of migraine headaches, alcohol abuse, tobacco abuse (quit 7 days ago), and pancreatitis (presumed secondary to etoh) as documented by CT 6/2020 and 9/2020 who presented with a couple week history of upper abdominal pain and vomiting. Her symptoms  coincide with frequent nsaid use as well as alcohol abuse. Gastritis/pud is certainly possible in setting of frequent nsaids, alcohol, and tobacco. Endoscopic eval not warranted as there is no report of wilner gi bleeding.   Lipase was elevated to 417 7/20/21 and I suspect acute pancreatitis and especially given her previous bouts in 6/2020 and 9/2020.   Liver test abnormalities are noted and suspect this is consistent with alcohol abuse/alcohol hepatitis. Abdominal ultrasound shows hepatic steatosis without signs of cholecystitis or bile duct dilation.      Plan:  The patient is encouraged to avoid alcohol, tobacco, and nsaids.   Continue oral ppi at discharge.   I will arrange for f/up at [facility] with Dr Quiroz (urgent slot within the next 2-3 weeks), but the patient should have follow up with her pcp within 5-7days for repeat LFTs.   If the patient tolerates diet advances, no objection to discharge to home.   Miralx 1 capful daily as needed for constipation.     UTI treatment per primary team.   [facility] will sign off. Call with questions.   Hortencia Castillo PA-C  [facility]  7/22/2021 11:22 AM  [phone] (office)

## 2021-07-22 NOTE — PLAN OF CARE
Patient c/o pain unresolved by oxycodone, 1 dose of Dilaudid was requested lower 0.5 mg dose given.  Discussed that this will not be an option when she goes home.  Melatonin and trazodone were also given and she slept well overnight.    Problem: Adult Inpatient Plan of Care  Goal: Patient-Specific Goal (Individualized)  Outcome: Improving  Goal: Absence of Hospital-Acquired Illness or Injury  Outcome: Improving  Intervention: Prevent Skin Injury  Recent Flowsheet Documentation  Taken 7/22/2021 0400 by Levon Zepeda II, RN  Body Position: position changed independently  Taken 7/22/2021 0000 by Levon Zepeda II, RN  Body Position: position changed independently  Goal: Optimal Comfort and Wellbeing  Outcome: Improving  Goal: Readiness for Transition of Care  Outcome: Improving     Problem: Fluid Imbalance (Pancreatitis)  Goal: Fluid Balance  Outcome: Improving     Problem: Infection (Pancreatitis)  Goal: Infection Symptom Resolution  Outcome: Improving     Problem: Nutrition Impaired (Pancreatitis)  Goal: Optimal Nutrition Intake  Outcome: Improving     Problem: Pain (Pancreatitis)  Goal: Acceptable Pain Control  Outcome: Improving     Problem: Respiratory Compromise (Pancreatitis)  Goal: Effective Oxygenation and Ventilation  Outcome: Improving  Intervention: Optimize Oxygenation and Ventilation  Recent Flowsheet Documentation  Taken 7/22/2021 0400 by Levon Zepeda II, RN  Activity Management: activity adjusted per tolerance  Taken 7/22/2021 0000 by Levon Zepeda II, RN  Activity Management: activity adjusted per tolerance     Problem: Alcohol Withdrawal  Goal: Alcohol Withdrawal Symptom Control  Outcome: Improving     Problem: Acute Neurologic Deterioration (Alcohol Withdrawal)  Goal: Optimal Neurologic Function  Outcome: Improving     Problem: Substance Misuse (Alcohol Withdrawal)  Goal: Readiness for Change Identified  Outcome: Improving

## 2021-07-22 NOTE — PROGRESS NOTES
Care Management Discharge Note    Discharge Date: 07/22/2021       Discharge Disposition:  Home    Discharge Services:  None    Discharge DME:  N/A    Discharge Transportation: Patient call uber or cab    Private pay costs discussed:     PAS Confirmation Code:      Patient/family educated on Medicare website which has current facility and service quality ratings:      Education Provided on the Discharge Plan:     Persons Notified of Discharge Plans: Yes    Patient/Family in Agreement with the Plan:  Yes    Handoff Referral Completed:    Additional Information:    XU NAVARRO  met with patient in her room and introduce self, CM role  and discussed patient  discharge goal and need any resources. Pt reports her discharge goal is to go home. Pt  declined to discussed any resources   and patient  stated  she has many resources already and support from friends to maintain sobriety. Pt reports her dad will be not give her ride today  at discharge  because she has two young children at home  and patient dad is taking care her two young children.  Pt reports she will be call uber or any cab between 100 pm to 200 pm.  Likely no need CM anticipated at discharge. CM to continue following as needed.    Carey OWENS/NEO/MELANIE   7/22/21

## 2021-07-22 NOTE — PROGRESS NOTES
"[facility] PROGRESS NOTE      ADMIT DATE: 7/18/2021     FACILITY: [facility]    PCP: Yohana Garcia, [phone]    ASSESSMENT AND PLAN:   36 year old female with a pertinent history of alcohol abuse, pancreatitis, alcoholic hepatitis and migraines, who presents to [facility] ED via walk-in for evaluation of abdominal pain and headache.     The patient reports epigastric abdominal pain that began day prior to admission and now radiates to her back. The pain feels like the \"worst heartburn ever\" and is exacerbated by \"everything\" but particularly with taking po. She reports associated nausea and had one episode of vomiting morning of admission. She has only been able to take sips of water and has not been able to eat. Patient reports a history of pancreatitis and feels like her symptoms today are similar to this. She has previously had to be admitted for pancreatitis. No diarrhea or constipation.      Patient reports gradual onset of a typical migraine day prior to admission for which she took a Maxalt. She believes this \"set off\" her abdominal pain. Patient developed another headache this afternoon when her abdominal pain started to get more severe. She reports throbbing pain at the base of her skull radiating to the left-side of her head.      Patient additionally believes she may have a UTI. She reports decreased urination and dysuria. Of note, she has an IUD in place. LMP was 1-2 months ago.    Principal Problem:    Acute pancreatitis, unspecified complication status, unspecified pancreatitis type  Active Problems:    Urinary Tract Infection    Transaminitis    Alcohol abuse    Alcoholic intoxication with complication (H)                Acute alcohol pancreatitis - lipase improving.  Consulted GI -->suspects alcohol pancreatitis.   Diet was advanced to solid diet     Alcoholic gastritis  Supportive management, pain and nausea control, IV hydration, on full liquids right now (ADAT)   Diluadid " was contributing to pain control; however, diluadid could be increasing LFTs. Holding diluadid and consulted pain team for pain control.      Alcohol abuse, intoxication  Patient was advised to completely quit drinking given transaminitis, hepatic steatosis and cytopenias   evaluation rule 25 assessment - patient declines SUDS referral  Monitor with CIWA -  is scoring 1-4 today      Hypoglycemia  Likely due to Alcohol effects  Improving  On PO solid diet now  Blood sugars have been stable. Will discontinue blood sugar checks.        Alcohol hepatitis/Elevated LFTs           Abdominal ultrasound showed hepatic steatosis  ALT and AST elevated but were stable--->however, on 7/22, they are trending up.-->most likely from dilaudid use?  Ordered acute pain team consult to find alternatives to pain control        Acute cystitis without hematuria  Continue cephalexin  urine culture > 100K pansensitive E. Coli-->c/w keflex     Leukopenia, thrombocytopenia  Likely temporary bone marrow suppression secondary to alcohol abuse     Migraines headache  Maxalt prn  Avoid NSAIDs per GI     Hypomagnesemia  Secondary to alcohol abuse  Replace per protocol        FEN/GI: low fat diet  DVT proph: ambulation, SCDs   Code status: Full Code      Discharge barriers:  -elevated LFTs, pain control  -ADOD: tomorrow      SUBJECTIVE:    Patient states her N/V is improving. She states she has abdominal pain that is not controlled with oxycodone but is controlled with diluadid. She denies any other complaints at this time.       ROS:  12 Points review of systems reviewed and is negative except for what has already been mentioned above    OBJECTIVE:  Patient Vitals for the past 24 hrs:   BP Temp Temp src Pulse Resp SpO2   07/22/21 0800 (!) 114/98 98.3  F (36.8  C) Oral 57 18 --   07/22/21 0435 (!) 150/94 -- Oral 60 24 98 %   07/21/21 2326 (!) 165/109 98  F (36.7  C) Oral 60 22 99 %   07/21/21 1948 (!) 155/107 98  F (36.7  C) Oral 62 20  98 %   07/21/21 1521 (!) 127/97 98.3  F (36.8  C) Oral 56 20 98 %          Intake/Output Summary (Last 24 hours) at 7/21/2021 0715  Last data filed at 7/20/2021 2120  Gross per 24 hour   Intake 4022 ml   Output 1625 ml   Net 2397 ml     GENRL: Alert and oriented X 3. Not in acute distress. Satting at 98% on room air.   HEENT: NC/AT      Neck- supple      Sclera- anicteric      Mucous membrane- moist and pink  CHEST: Clear to auscultation bilaterally  HEART: S1S2 regular. No murmurs, rubs or gallops  ABDMN: Soft. Non-tender on mild palpation of all quadrants. No guarding or rigidity. Bowel sounds- active  EXTRM: Extremities are warm and non-tender. No pedal edema.   NEURO: No involuntary movements  INTGM: please see nursing assessment for full skin assessment  PULSES: 2+ and symmetric all extremities  PSYCH: normal affect, normal speech     DIAGNOSTIC DATA:          Recent Results (from the past 24 hour(s))   Vitamin B12    Collection Time: 07/21/21  2:24 PM   Result Value Ref Range    Vitamin B12 521 213 - 816 pg/mL   Folate    Collection Time: 07/21/21  2:24 PM   Result Value Ref Range    Folic Acid >20.0 >=3.5 ng/mL   Glucose by meter    Collection Time: 07/21/21  5:34 PM   Result Value Ref Range    GLUCOSE BY METER POCT 87 70 - 125 mg/dL   Magnesium    Collection Time: 07/22/21  8:12 AM   Result Value Ref Range    Magnesium 1.9 1.8 - 2.6 mg/dL   CBC with platelets    Collection Time: 07/22/21  8:12 AM   Result Value Ref Range    WBC Count 3.4 (L) 4.0 - 11.0 10e3/uL    RBC Count 3.36 (L) 3.80 - 5.20 10e6/uL    Hemoglobin 11.4 (L) 11.7 - 15.7 g/dL    Hematocrit 33.6 (L) 35.0 - 47.0 %     78 - 100 fL    MCH 33.9 (H) 26.5 - 33.0 pg    MCHC 33.9 31.5 - 36.5 g/dL    RDW 14.6 10.0 - 15.0 %    Platelet Count 89 (L) 150 - 450 10e3/uL   Comprehensive metabolic panel    Collection Time: 07/22/21  8:12 AM   Result Value Ref Range    Sodium 140 136 - 145 mmol/L    Potassium 4.0 3.5 - 5.0 mmol/L    Chloride 108 (H) 98 -  107 mmol/L    Carbon Dioxide (CO2) 25 22 - 31 mmol/L    Anion Gap 7 5 - 18 mmol/L    Urea Nitrogen 3 (L) 8 - 22 mg/dL    Creatinine 0.63 0.60 - 1.10 mg/dL    Calcium 8.5 8.5 - 10.5 mg/dL    Glucose 75 70 - 125 mg/dL    Alkaline Phosphatase 95 45 - 120 U/L     (H) 0 - 40 U/L     (H) 0 - 45 U/L    Protein Total 6.0 6.0 - 8.0 g/dL    Albumin 3.1 (L) 3.5 - 5.0 g/dL    Bilirubin Total 0.8 0.0 - 1.0 mg/dL    GFR Estimate >90 >60 mL/min/1.73m2   Glucose by meter    Collection Time: 07/22/21  9:03 AM   Result Value Ref Range    GLUCOSE BY METER POCT 78 70 - 125 mg/dL   ALT    Collection Time: 07/22/21 11:46 AM   Result Value Ref Range     (H) 0 - 45 U/L   AST    Collection Time: 07/22/21 11:46 AM   Result Value Ref Range     (H) 0 - 40 U/L   Lipase    Collection Time: 07/22/21 11:46 AM   Result Value Ref Range    Lipase 110 (H) 0 - 52 U/L        Results for orders placed or performed during the hospital encounter of 07/18/21   US Abdomen Limited    Impression    IMPRESSION:  1.  Sludge in the gallbladder. No specific evidence for cholecystitis.  2.  Hepatic steatosis.       CT Abdomen Pelvis w Contrast    Impression    IMPRESSION:   1.  Subtle enhancement of right ureter raises question of ureteral inflammation, no CT evidence for pyelonephritis.  2.  Air-fluid level within the bladder, presumably from recent catheterization. Minimal bladder wall thickening.  3.  Resolution of peripancreatic edema.  4.  Prominent fatty infiltration of liver unchanged.   XR Chest 2 Views    Impression    IMPRESSION: Lungs are clear. No effusions. Heart and pulmonary vascularity are normal. No signs of acute disease.          All recent labs reviewed personally  Radiology report reviewed.         The total time spent in preparing this progress note is about 40 minutes, >50% time spent in care co-ordination that includes reviewing labs, images, discussing the plan of care with patient/family, consultants, and social  services.      Kevin Arthur MD.   [facility] Medicine Service   [phone]   Pager [phone]

## 2021-07-22 NOTE — CONSULTS
[facility] ACUTE PAIN SERVICE    ([facility], [facility], [facility])   Consult Note    Date of Admission:  7/18/2021  Date of Consult: 07/22/21    Physician requesting consult: Kevin Arthur MD   Reason for consult: pain control for epigastric pain. Patient has liver dysfunction and diluadid could be contributing to elevated LFTs.     Assessment/Plan:     Fabiola Raymond is a 36 year old female who was admitted on 7/18/2021.   Pain Service is asked to see the patient for epigastric pain, elevated LFTs concern that dilaudid is contributing.  Admitted for abdominal, back pain, headache and nausea.  History of migraines headaches, pancreatitis. Patient identifies abdominal symptoms were triggered by migraine.  Abdominal pain since July 4th weekend when she drank heavily, pain worsened with nausea and vomiting after she too a dose of Maxalt for migraine headache.    She also had complaint of decreased urine output and dysuria.  Drinks alcohol about 3 times/week, ongoing tobaco use.  Social Work Consulted for Rule 25 assessment which patient declined need for any further assistance with abstaining from alcohol.    GI Consulted:    Upper abdominal pain- suspected alcohol pancreatitis at admission with lipase in the 400s. CT abdomen and pelvis 7/20/21 did not show acute inflammation involving the pancreas.   Gastritis/PUD secondary to alcohol, frequent nsaids, and recent cigarette smoking (quit 7 days ago) is possible, but not likely to cause severe pain. No evidence to support gi bleeding. Suspect drop in Hgb is related to generous iv hydration (hgb is up today). The patient's pain seems to be improving as she tolerated a soft diet last night.   Avoid NSAIDs, PPI.   F/U with [facility] in 2-3 weeks  F/u with PCP 5-7 days repeat LFTS  Miralax for constipation.  Encourage the patient to avoid alcohol/maintain sobriety.    Over the past 24 hours patient has received one 0.5 mg IV hydromorphone, 3(5mg  oxycodone) and 1(2mg) hydromorphone MME is 40 mg.    Describes pain right upper quadrant, increased after having scrambled eggs with cheese this AM, feeling better this afternoon.  Getting relief from pain medication feels that the hydromorphone IV and pills helps better than the oxycodone.   The patient does smoke and regular alcohol use.    PLAN:   1) Pain is consistent with acute abdominal associated with alcohol induced pancreatitis.  Symptoms are improving.   Reviewed LFTs discussed with Hospitalist at this time would recommend resuming hydromorphone orally for acute pain associated with pancreatitis  2)Multimodal Medication Therapy  Topical: none  NSAID'S:none due to gastritis  Adjuvants: vistaril 25 mg every 6 hours prn   Antidepressants/anxiolytics:melatonin 5 mg every evening prn   Opioids:  Hydromorphone 2mg every four hours prn  IV Pain medication: none  3)Non-medication interventions  Pharmacy consult- appreciate recommendations   Acupuncture consult- as available Mon and Thursday   Integrative consult - called referral to [phone]   4)Constipation Prophylaxis  Daily prn  5) Follow up   -Opioid prescriber has been none  -Discharge Recommendations - We recommend prescribing the following at the time of discharge: hydromorphone 2mg every four hours prn #12 tablets          History of Present Illness (HPI):       Fabiola Raymond is a 36 year old old female with acute abdominal pain due to pancreatitis.  The pain is reported to be acute denies history of chronic pain.         MN  pulled from system on 07/22/21. Last refill on 7/4/21 hydromorphone 2 mg 10 tablets for 2 days, previously 9/12/20 hydromorphone 2 mg tablets 15 for 3 days.  Alprazolam 0.25 mg tablets 4 prescriptions since 9/22/20, 12/6/20 1/8/20 and 2/5/21.  Small prescriptions 5-15 quanity. This indicated 2 opioid prescriptions over the past year.    Most common prescriber is Yohana Garcia CNP, Primary Provider        Medical History  Patient  Active Problem List    Diagnosis Date Noted     Alcoholic intoxication with complication (H) 2021     Priority: Medium     Acute pancreatitis, unspecified complication status, unspecified pancreatitis type 2021     Priority: Medium     Alcoholic hepatitis without ascites      Priority: Medium     Alcohol-induced acute pancreatitis, unspecified complication status 09/10/2020     Priority: Medium     Transaminitis      Priority: Medium     History of migraine      Priority: Medium     Alcohol abuse      Priority: Medium     Depression, unspecified depression type      Priority: Medium     Lymphadenopathy      Priority: Medium     Created by Conversion  Replacement Utility updated for latest IMO load         Skin: A Rash      Priority: Medium     Created by Conversion         Fatigue      Priority: Medium     Created by Conversion         Generalized Anxiety Disorder      Priority: Medium     Created by Conversion         Urinary Tract Infection      Priority: Medium     Created by Conversion         Pain During Urination (Dysuria)      Priority: Medium     Created by Conversion         Acute Pharyngitis      Priority: Medium     Created by Conversion            Surgical History  She  has a past surgical history that includes  Section.     Past Surgical History:   Procedure Laterality Date      SECTION      x2       Allergies  Allergies   Allergen Reactions     Oxycodone-Acetaminophen Rash     Tolerates hydrocodone-APAP       Prior to Admission Medications   Medications Prior to Admission   Medication Sig Dispense Refill Last Dose     rizatriptan (MAXALT-MLT) 10 MG ODT Take 10 mg by mouth every 2 hours as needed for migraine   2021     [DISCONTINUED] ibuprofen (ADVIL/MOTRIN) 200 MG tablet Take 800 mg by mouth every 6 hours as needed for mild pain   2021       Social History  Reviewed, and she  reports that she has been smoking. She has never used smokeless tobacco. She reports  "current alcohol use. She reports that she does not use drugs.  Social History     Tobacco Use     Smoking status: Current Some Day Smoker     Smokeless tobacco: Never Used     Tobacco comment: 1 cigarette every other week   Substance Use Topics     Alcohol use: Yes     Alcohol/week: 0.0 - 10.0 standard drinks     Comment: Alcoholic Drinks/day: varies from none to several drinks a day, drinks bi weekly       Family History  Reviewed, and family history includes Nephrolithiasis in her maternal grandfather.    Review of Systems  Complete ROS reviewed, unless noted, all other systems reviewed and found to be negative.        Objective:     Physical Exam:  BP (!) 114/98 (BP Location: Left arm)   Pulse 57   Temp 98.3  F (36.8  C) (Oral)   Resp 18   Ht 1.6 m (5' 3\")   Wt 54.8 kg (120 lb 14.4 oz)   SpO2 98%   BMI 21.42 kg/m    Weight:   Weight change:   Body mass index is 21.42 kg/m .      General Appearance:  Alert, cooperative, pale, pleasant, sitting up in bed   Head:  Normocephalic, without obvious abnormality, atraumatic   Eyes:  PERRL, conjunctiva/corneas clear, EOM's intact   ENT/Throat: Lips, mucosa, and tongue normal; teeth and gums normal   Lymph/Neck: Supple, symmetrical, trachea midline   Lungs:   respirations unlabored   Chest Wall:  No tenderness or deformity   Cardiovascular/Heart:  Regular rate and rhyth. Edema: none   Abdomen:   Soft, tender, non distended appearance   Musculoskeletal: Spine and extremities normal, atraumatic   Skin: Skin color, texture, turgor normal, no rashes or lesions   Neurologic: Reflexes intact, cooridanated movement   Alert and oriented X 3, Moves all 4 extremities       Psych: Affect is limited range, mild anxiety            Imaging Reviewed  XR Chest 2 Views    Result Date: 7/20/2021  EXAM: XR CHEST 2 VW LOCATION: [facility] DATE/TIME: 7/20/2021 8:44 PM INDICATION: breathlessness in pt with pancreatitis, evaluate for inflammation in lower lung " fields COMPARISON: Chest CT 09/10/2020, Abdomen CT earlier today     IMPRESSION: Lungs are clear. No effusions. Heart and pulmonary vascularity are normal. No signs of acute disease.    US Abdomen Limited    Result Date: 7/19/2021  EXAM: US ABDOMEN LIMITED LOCATION: [facility] DATE/TIME: 7/19/2021 4:19 AM INDICATION: abd pain, pancreatitis COMPARISON: Abdominal ultrasound 09/10/2020. CT abdomen and pelvis 09/10/2020. TECHNIQUE: Limited abdominal ultrasound. FINDINGS: GALLBLADDER: Small amount of sludge in the gallbladder. No significant wall thickening. No pericholecystic fluid. Negative ultrasound Hackett sign. BILE DUCTS: No biliary dilatation. The common duct measures 4 mm. LIVER: Echogenic parenchyma. No focal mass. RIGHT KIDNEY: No hydronephrosis. PANCREAS: The visualized portions are normal. No ascites.     IMPRESSION: 1.  Sludge in the gallbladder. No specific evidence for cholecystitis. 2.  Hepatic steatosis.     US Pelvic Complete with Transvaginal    Result Date: 7/3/2021  EXAM: US PELVIS WITH TRANSVAGINAL NON OB LOCATION: [facility] DATE/TIME: 7/3/2021 7:41 PM INDICATION: right lower abd/pelvic pain COMPARISON: None. TECHNIQUE: Transabdominal scans were performed. Endovaginal ultrasound was performed to better visualize the adnexa. FINDINGS: UTERUS: 9.4 x 3.6 x 4.8 cm. Normal in size and position with no masses. An IUD is positioned centrally in the uterine fundus. ENDOMETRIUM: 4 mm. Normal smooth endometrium. RIGHT OVARY: 2.4 x 2.1 x 2.9 cm. Normal with flow demonstrated. LEFT OVARY: 3.6 x 1.7 x 3.1 cm. Normal with flow demonstrated. No significant free fluid.     1.  Normal pelvic ultrasound.     CT Abdomen Pelvis w Contrast    Result Date: 7/20/2021  EXAM: CT ABDOMEN PELVIS W CONTRAST LOCATION: [facility] DATE/TIME: 7/20/2021 8:39 PM INDICATION: Epigastric pain COMPARISON: Ultrasound 7/19/2021. CT 9/10/2020 TECHNIQUE: CT scan of the  abdomen and pelvis was performed following injection of IV contrast. Multiplanar reformats were obtained. Dose reduction techniques were used. CONTRAST: isovue 370 100ml FINDINGS: LOWER CHEST: Normal. HEPATOBILIARY: Prominent diffuse fatty infiltration of liver unchanged. PANCREAS: Normal. Resolution of previous peripancreatic inflammation. SPLEEN: Normal. ADRENAL GLANDS: Normal. KIDNEYS/BLADDER: Normal appearance of kidneys, no stones or hydronephrosis. There does appear to be subtle enhancement of the uroepithelium involving proximal right ureter, no ureteral stone. There is a large air-fluid level within the bladder, presumably from recent catheterization. There does appear to be mild bladder wall thickening. No bladder stones. BOWEL: No obstruction or inflammatory change. Appendix normal. LYMPH NODES: Normal. VASCULATURE: Unremarkable. PELVIC ORGANS: IUD in good position. No adnexal lesions. Small volume of free pelvic fluid likely physiologic. MUSCULOSKELETAL: Normal.     IMPRESSION: 1.  Subtle enhancement of right ureter raises question of ureteral inflammation, no CT evidence for pyelonephritis. 2.  Air-fluid level within the bladder, presumably from recent catheterization. Minimal bladder wall thickening. 3.  Resolution of peripancreatic edema. 4.  Prominent fatty infiltration of liver unchanged.      Labs Reviewed Personally By Myself  Results for orders placed or performed during the hospital encounter of 07/18/21   US Abdomen Limited     Status: None    Narrative    EXAM: US ABDOMEN LIMITED  LOCATION: [facility]  DATE/TIME: 7/19/2021 4:19 AM    INDICATION: abd pain, pancreatitis  COMPARISON: Abdominal ultrasound 09/10/2020. CT abdomen and pelvis 09/10/2020.  TECHNIQUE: Limited abdominal ultrasound.    FINDINGS:    GALLBLADDER: Small amount of sludge in the gallbladder. No significant wall thickening. No pericholecystic fluid. Negative ultrasound Hackett sign.    BILE DUCTS: No biliary  dilatation. The common duct measures 4 mm.    LIVER: Echogenic parenchyma. No focal mass.    RIGHT KIDNEY: No hydronephrosis.    PANCREAS: The visualized portions are normal.    No ascites.      Impression    IMPRESSION:  1.  Sludge in the gallbladder. No specific evidence for cholecystitis.  2.  Hepatic steatosis.       CT Abdomen Pelvis w Contrast     Status: None    Narrative    EXAM: CT ABDOMEN PELVIS W CONTRAST  LOCATION: [facility]   DATE/TIME: 7/20/2021 8:39 PM    INDICATION: Epigastric pain  COMPARISON: Ultrasound 7/19/2021. CT 9/10/2020  TECHNIQUE: CT scan of the abdomen and pelvis was performed following injection of IV contrast. Multiplanar reformats were obtained. Dose reduction techniques were used.  CONTRAST: isovue 370 100ml    FINDINGS:   LOWER CHEST: Normal.    HEPATOBILIARY: Prominent diffuse fatty infiltration of liver unchanged.    PANCREAS: Normal. Resolution of previous peripancreatic inflammation.    SPLEEN: Normal.    ADRENAL GLANDS: Normal.    KIDNEYS/BLADDER: Normal appearance of kidneys, no stones or hydronephrosis. There does appear to be subtle enhancement of the uroepithelium involving proximal right ureter, no ureteral stone.    There is a large air-fluid level within the bladder, presumably from recent catheterization. There does appear to be mild bladder wall thickening. No bladder stones.    BOWEL: No obstruction or inflammatory change. Appendix normal.    LYMPH NODES: Normal.    VASCULATURE: Unremarkable.    PELVIC ORGANS: IUD in good position. No adnexal lesions. Small volume of free pelvic fluid likely physiologic.    MUSCULOSKELETAL: Normal.      Impression    IMPRESSION:   1.  Subtle enhancement of right ureter raises question of ureteral inflammation, no CT evidence for pyelonephritis.  2.  Air-fluid level within the bladder, presumably from recent catheterization. Minimal bladder wall thickening.  3.  Resolution of peripancreatic edema.  4.   Prominent fatty infiltration of liver unchanged.   XR Chest 2 Views     Status: None    Narrative    EXAM: XR CHEST 2 VW  LOCATION: [facility]   DATE/TIME: 7/20/2021 8:44 PM    INDICATION: breathlessness in pt with pancreatitis, evaluate for inflammation in lower lung fields  COMPARISON: Chest CT 09/10/2020, Abdomen CT earlier today      Impression    IMPRESSION: Lungs are clear. No effusions. Heart and pulmonary vascularity are normal. No signs of acute disease.   CBC (+ platelets, no diff)     Status: Abnormal   Result Value Ref Range    WBC Count 3.5 (L) 4.0 - 11.0 10e3/uL    RBC Count 3.81 3.80 - 5.20 10e6/uL    Hemoglobin 12.9 11.7 - 15.7 g/dL    Hematocrit 37.3 35.0 - 47.0 %    MCV 98 78 - 100 fL    MCH 33.9 (H) 26.5 - 33.0 pg    MCHC 34.6 31.5 - 36.5 g/dL    RDW 15.1 (H) 10.0 - 15.0 %    Platelet Count 91 (L) 150 - 450 10e3/uL   Basic metabolic panel     Status: Abnormal   Result Value Ref Range    Sodium 139 136 - 145 mmol/L    Potassium 4.1 3.5 - 5.0 mmol/L    Chloride 103 98 - 107 mmol/L    Carbon Dioxide (CO2) 23 22 - 31 mmol/L    Anion Gap 13 5 - 18 mmol/L    Urea Nitrogen 7 (L) 8 - 22 mg/dL    Creatinine 0.62 0.60 - 1.10 mg/dL    Calcium 9.3 8.5 - 10.5 mg/dL    Glucose 76 70 - 125 mg/dL    GFR Estimate >90 >60 mL/min/1.73m2   Hepatic function panel     Status: Abnormal   Result Value Ref Range    Bilirubin Total 0.8 0.0 - 1.0 mg/dL    Bilirubin Direct 0.3 <=0.5 mg/dL    Protein Total 7.2 6.0 - 8.0 g/dL    Albumin 3.9 3.5 - 5.0 g/dL    Alkaline Phosphatase 99 45 - 120 U/L     (H) 0 - 40 U/L     (H) 0 - 45 U/L   Lipase     Status: Abnormal   Result Value Ref Range    Lipase 292 (H) 0 - 52 U/L   UA with Microscopic reflex to Culture     Status: Abnormal    Specimen: Urine, Midstream   Result Value Ref Range    Color Urine Yellow Colorless, Straw, Light Yellow, Yellow    Appearance Urine Turbid (A) Clear    Glucose Urine Negative Negative mg/dL    Bilirubin Urine  Negative Negative    Ketones Urine Negative Negative mg/dL    Specific Gravity Urine 1.011 1.001 - 1.030    Blood Urine Negative Negative    pH Urine 6.5 5.0 - 7.0    Protein Albumin Urine Negative Negative mg/dL    Urobilinogen Urine <2.0 <2.0 mg/dL    Nitrite Urine Negative Negative    Leukocyte Esterase Urine 500 Lorrie/uL (A) Negative    Bacteria Urine Moderate (A) None Seen /HPF    Mucus Urine Present (A) None Seen /LPF    RBC Urine 1 <=2 /HPF    WBC Urine 15 (H) <=5 /HPF    Squamous Epithelials Urine 5 (H) <=1 /HPF    Narrative    Urine Culture ordered based on laboratory criteria   Alcohol level blood     Status: Abnormal   Result Value Ref Range    Alcohol, Blood 125 (H) None detected mg/dL   SARS-COV2 (COVID-19) Virus RT-PCR     Status: Normal    Specimen: Nasopharyngeal; Swab   Result Value Ref Range    SARS CoV2 PCR Negative Negative    Narrative    Testing was performed using the [employer]  SARS-CoV-2 & Influenza A/B Assay on the [employer]  Sumi  System.  This test should be ordered for the detection of SARS-COV-2 in individuals who meet SARS-CoV-2 clinical and/or epidemiological criteria. Test performance is unknown in asymptomatic patients.  This test is for in vitro diagnostic use under the FDA EUA for laboratories certified under CLIA to perform moderate and/or high complexity testing. This test has not been FDA cleared or approved.  A negative test does not rule out the presence of PCR inhibitors in the specimen or target RNA in concentration below the limit of detection for the assay. The possibility of a false negative should be considered if the patient's recent exposure or clinical presentation suggests COVID-19.  [facility] are certified under the Clinical Laboratory Improvement Amendments of 1988 (CLIA-88) as qualified to perform moderate and/or high complexity laboratory testing.   Lipase     Status: Abnormal   Result Value Ref Range    Lipase 340 (H) 0 - 52 U/L   Comprehensive  metabolic panel     Status: Abnormal   Result Value Ref Range    Sodium 137 136 - 145 mmol/L    Potassium 4.8 3.5 - 5.0 mmol/L    Chloride 102 98 - 107 mmol/L    Carbon Dioxide (CO2) 23 22 - 31 mmol/L    Anion Gap 12 5 - 18 mmol/L    Urea Nitrogen 9 8 - 22 mg/dL    Creatinine 0.67 0.60 - 1.10 mg/dL    Calcium 8.6 8.5 - 10.5 mg/dL    Glucose 56 (LL) 70 - 125 mg/dL    Alkaline Phosphatase 90 45 - 120 U/L     (H) 0 - 40 U/L     (H) 0 - 45 U/L    Protein Total 6.2 6.0 - 8.0 g/dL    Albumin 3.5 3.5 - 5.0 g/dL    Bilirubin Total 0.9 0.0 - 1.0 mg/dL    GFR Estimate >90 >60 mL/min/1.73m2   Magnesium     Status: Abnormal   Result Value Ref Range    Magnesium 1.7 (L) 1.8 - 2.6 mg/dL   Phosphorus     Status: Normal   Result Value Ref Range    Phosphorus 2.8 2.5 - 4.5 mg/dL   INR     Status: Normal   Result Value Ref Range    INR 1.15 0.85 - 1.15   CBC with platelets     Status: Abnormal   Result Value Ref Range    WBC Count 4.0 4.0 - 11.0 10e3/uL    RBC Count 3.28 (L) 3.80 - 5.20 10e6/uL    Hemoglobin 11.0 (L) 11.7 - 15.7 g/dL    Hematocrit 32.9 (L) 35.0 - 47.0 %     78 - 100 fL    MCH 33.5 (H) 26.5 - 33.0 pg    MCHC 33.4 31.5 - 36.5 g/dL    RDW 14.9 10.0 - 15.0 %    Platelet Count 75 (L) 150 - 450 10e3/uL   Glucose by meter     Status: Abnormal   Result Value Ref Range    GLUCOSE BY METER POCT 56 (LL) 70 - 125 mg/dL   Glucose by meter     Status: Abnormal   Result Value Ref Range    GLUCOSE BY METER POCT 59 (LL) 70 - 125 mg/dL   Glucose by meter     Status: Abnormal   Result Value Ref Range    GLUCOSE BY METER POCT 171 (H) 70 - 125 mg/dL   Magnesium     Status: Normal   Result Value Ref Range    Magnesium 1.8 1.8 - 2.6 mg/dL   Glucose by meter     Status: Abnormal   Result Value Ref Range    GLUCOSE BY METER POCT 135 (H) 70 - 125 mg/dL   Glucose by meter     Status: Normal   Result Value Ref Range    GLUCOSE BY METER POCT 122 70 - 125 mg/dL   Magnesium     Status: Normal   Result Value Ref Range     Magnesium 1.9 1.8 - 2.6 mg/dL   Hepatic panel     Status: Abnormal   Result Value Ref Range    Bilirubin Total 0.9 0.0 - 1.0 mg/dL    Bilirubin Direct 0.4 <=0.5 mg/dL    Protein Total 6.2 6.0 - 8.0 g/dL    Albumin 3.4 (L) 3.5 - 5.0 g/dL    Alkaline Phosphatase 84 45 - 120 U/L     (H) 0 - 40 U/L     (H) 0 - 45 U/L   Basic metabolic panel     Status: Abnormal   Result Value Ref Range    Sodium 141 136 - 145 mmol/L    Potassium 4.4 3.5 - 5.0 mmol/L    Chloride 109 (H) 98 - 107 mmol/L    Carbon Dioxide (CO2) 28 22 - 31 mmol/L    Anion Gap 4 (L) 5 - 18 mmol/L    Urea Nitrogen 3 (L) 8 - 22 mg/dL    Creatinine 0.74 0.60 - 1.10 mg/dL    Calcium 8.4 (L) 8.5 - 10.5 mg/dL    Glucose 106 70 - 125 mg/dL    GFR Estimate >90 >60 mL/min/1.73m2   Lipase     Status: Abnormal   Result Value Ref Range    Lipase 417 (H) 0 - 52 U/L   Glucose by meter     Status: Normal   Result Value Ref Range    GLUCOSE BY METER POCT 106 70 - 125 mg/dL   Glucose by meter     Status: Abnormal   Result Value Ref Range    GLUCOSE BY METER POCT 127 (H) 70 - 125 mg/dL   Magnesium     Status: Normal   Result Value Ref Range    Magnesium 1.9 1.8 - 2.6 mg/dL   Hepatic panel     Status: Abnormal   Result Value Ref Range    Bilirubin Total 0.7 0.0 - 1.0 mg/dL    Bilirubin Direct 0.3 <=0.5 mg/dL    Protein Total 5.8 (L) 6.0 - 8.0 g/dL    Albumin 3.1 (L) 3.5 - 5.0 g/dL    Alkaline Phosphatase 85 45 - 120 U/L     (H) 0 - 40 U/L     (H) 0 - 45 U/L   INR     Status: Normal   Result Value Ref Range    INR 1.02 0.90 - 1.15   Glucose by meter     Status: Normal   Result Value Ref Range    GLUCOSE BY METER POCT 112 70 - 125 mg/dL   Glucose by meter     Status: Normal   Result Value Ref Range    GLUCOSE BY METER POCT 83 70 - 125 mg/dL   CBC with platelets and differential     Status: Abnormal   Result Value Ref Range    WBC Count 2.6 (L) 4.0 - 11.0 10e3/uL    RBC Count 3.19 (L) 3.80 - 5.20 10e6/uL    Hemoglobin 10.8 (L) 11.7 - 15.7 g/dL     Hematocrit 32.4 (L) 35.0 - 47.0 %     (H) 78 - 100 fL    MCH 33.9 (H) 26.5 - 33.0 pg    MCHC 33.3 31.5 - 36.5 g/dL    RDW 15.1 (H) 10.0 - 15.0 %    Platelet Count 79 (L) 150 - 450 10e3/uL    % Neutrophils 51 %    % Lymphocytes 34 %    % Monocytes 11 %    % Eosinophils 4 %    % Basophils 0 %    % Immature Granulocytes 0 %    NRBCs per 100 WBC 0 <1 /100    Absolute Neutrophils 1.3 (L) 1.6 - 8.3 10e3/uL    Absolute Lymphocytes 0.9 0.8 - 5.3 10e3/uL    Absolute Monocytes 0.3 0.0 - 1.3 10e3/uL    Absolute Eosinophils 0.1 0.0 - 0.7 10e3/uL    Absolute Basophils 0.0 0.0 - 0.2 10e3/uL    Absolute Immature Granulocytes 0.0 <=0.0 10e3/uL    Absolute NRBCs 0.0 10e3/uL   Drugs of Abuse 1 Panel, Urine ([facility])     Status: Abnormal   Result Value Ref Range    Amphetamines Urine Screen Negative Screen Negative    Benzodiazepines Urine Screen Negative Screen Negative    Opiates Urine Screen Negative Screen Negative    PCP Urine Screen Negative Screen Negative    Cannabinoids Urine Screen Negative Screen Negative    Barbiturates Urine Screen Negative Screen Negative    Cocaine Urine Screen Negative Screen Negative    Oxycodone Urine Screen Positive (A) Screen Negative    Creatinine Urine mg/dL 24 mg/dL    Narrative    Drug                  Screening Threshold    Amphetamines           1000 ng/mL  Benzodiazepine          200 ng/mL  Opiates                 300 ng/mL  Phencyclidine            25 ng/mL  THC Metabolite           50 ng/mL  Barbiturates            200 ng/mL  Cocaine Metabolite      150 ng/mL  Oxycodone               100 ng/mL    Screening results are to be used only for medical purposes.  Unconfirmed screening results must not be used for non-  medical purposes.   Glucose by meter     Status: Normal   Result Value Ref Range    GLUCOSE BY METER POCT 99 70 - 125 mg/dL   Vitamin B12     Status: Normal   Result Value Ref Range    Vitamin B12 521 213 - 816 pg/mL   Folate     Status: Normal   Result Value Ref Range     Folic Acid >20.0 >=3.5 ng/mL   Glucose by meter     Status: Normal   Result Value Ref Range    GLUCOSE BY METER POCT 87 70 - 125 mg/dL   Magnesium     Status: Normal   Result Value Ref Range    Magnesium 1.9 1.8 - 2.6 mg/dL   CBC with platelets     Status: Abnormal   Result Value Ref Range    WBC Count 3.4 (L) 4.0 - 11.0 10e3/uL    RBC Count 3.36 (L) 3.80 - 5.20 10e6/uL    Hemoglobin 11.4 (L) 11.7 - 15.7 g/dL    Hematocrit 33.6 (L) 35.0 - 47.0 %     78 - 100 fL    MCH 33.9 (H) 26.5 - 33.0 pg    MCHC 33.9 31.5 - 36.5 g/dL    RDW 14.6 10.0 - 15.0 %    Platelet Count 89 (L) 150 - 450 10e3/uL   Comprehensive metabolic panel     Status: Abnormal   Result Value Ref Range    Sodium 140 136 - 145 mmol/L    Potassium 4.0 3.5 - 5.0 mmol/L    Chloride 108 (H) 98 - 107 mmol/L    Carbon Dioxide (CO2) 25 22 - 31 mmol/L    Anion Gap 7 5 - 18 mmol/L    Urea Nitrogen 3 (L) 8 - 22 mg/dL    Creatinine 0.63 0.60 - 1.10 mg/dL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

## 2021-07-22 NOTE — PLAN OF CARE
PRN ativan given for 2000 CIWA assessment, pt anxious and crying. PRN Oxycodone given. Anticipated discharge home tomorrow. Plans on taking Lyft.

## 2021-07-22 NOTE — SIGNIFICANT EVENT
Discussed elevated LFTs with pharmacy who recommended discontinuing protonix and switching to pepcid and discontinuing keflex and switching to a fluoroquinolone such as levaquin. Changes made. Patient updated on plan.

## 2021-07-22 NOTE — PROGRESS NOTES
Care Management Follow Up    Length of Stay (days): 3    Expected Discharge Date:  7/24/21     Concerns to be Addressed:       Patient plan of care discussed at interdisciplinary rounds:   Yes    Anticipated Discharge Disposition:  home     Anticipated Discharge Services: none     Anticipated Discharge DME:  N/A    Patient/family educated on Medicare website which has current facility and service quality ratings:    Education Provided on the Discharge Plan:  Yes  Patient/Family in Agreement with the Plan:  Yes    Referrals Placed by CM/SW:    Private pay costs discussed: N/A    Additional Information:    CM following medical progression . Current discharge plan is to discharge home once medically cleared.    Carey OWENS/NEO/MELANIE   7/22/21

## 2021-07-22 NOTE — PLAN OF CARE
Problem: Pain (Pancreatitis)  Goal: Acceptable Pain Control  Outcome: No Change  Rating pain 5/10. Pain team following. PRN PO Dilaudid ordered. Will continue assess and intervene per protocol.      Problem: Infection (Pancreatitis)  Goal: Infection Symptom Resolution  Outcome: Improving  Afebrile. No s/s of infection at this time. On ABX for UTI.      Problem: Nutrition Impaired (Pancreatitis)  Goal: Optimal Nutrition Intake  Outcome: Improving  Tolerating low fat diet.      Problem: Alcohol Withdrawal  Goal: Alcohol Withdrawal Symptom Control  Outcome: Improving  CIWA 1 this morning. No hallucination.     Discharge orders discontinued due to elevated LFTs. Patient unhappy about this.  Emotional support provided. Plan is discharge tomorrow.

## 2021-07-23 LAB
ALBUMIN SERPL-MCNC: 3 G/DL (ref 3.5–5)
ALP SERPL-CCNC: 96 U/L (ref 45–120)
ALT SERPL W P-5'-P-CCNC: 208 U/L (ref 0–45)
ALT SERPL W P-5'-P-CCNC: 228 U/L (ref 0–45)
ANION GAP SERPL CALCULATED.3IONS-SCNC: 5 MMOL/L (ref 5–18)
AST SERPL W P-5'-P-CCNC: 274 U/L (ref 0–40)
AST SERPL W P-5'-P-CCNC: 303 U/L (ref 0–40)
BILIRUB SERPL-MCNC: 0.6 MG/DL (ref 0–1)
BUN SERPL-MCNC: 6 MG/DL (ref 8–22)
CALCIUM SERPL-MCNC: 8.5 MG/DL (ref 8.5–10.5)
CHLORIDE BLD-SCNC: 108 MMOL/L (ref 98–107)
CO2 SERPL-SCNC: 26 MMOL/L (ref 22–31)
CREAT SERPL-MCNC: 0.58 MG/DL (ref 0.6–1.1)
ERYTHROCYTE [DISTWIDTH] IN BLOOD BY AUTOMATED COUNT: 14.4 % (ref 10–15)
GFR SERPL CREATININE-BSD FRML MDRD: >90 ML/MIN/1.73M2
GLUCOSE BLD-MCNC: 85 MG/DL (ref 70–125)
HCT VFR BLD AUTO: 32.9 % (ref 35–47)
HGB BLD-MCNC: 11.2 G/DL (ref 11.7–15.7)
MAGNESIUM SERPL-MCNC: 1.8 MG/DL (ref 1.8–2.6)
MCH RBC QN AUTO: 33.6 PG (ref 26.5–33)
MCHC RBC AUTO-ENTMCNC: 34 G/DL (ref 31.5–36.5)
MCV RBC AUTO: 99 FL (ref 78–100)
PLATELET # BLD AUTO: 103 10E3/UL (ref 150–450)
POTASSIUM BLD-SCNC: 3.7 MMOL/L (ref 3.5–5)
PROT SERPL-MCNC: 5.7 G/DL (ref 6–8)
RBC # BLD AUTO: 3.33 10E6/UL (ref 3.8–5.2)
SODIUM SERPL-SCNC: 139 MMOL/L (ref 136–145)
WBC # BLD AUTO: 3.1 10E3/UL (ref 4–11)

## 2021-07-23 PROCEDURE — 82040 ASSAY OF SERUM ALBUMIN: CPT | Performed by: FAMILY MEDICINE

## 2021-07-23 PROCEDURE — 250N000013 HC RX MED GY IP 250 OP 250 PS 637: Performed by: HOSPITALIST

## 2021-07-23 PROCEDURE — 250N000011 HC RX IP 250 OP 636: Performed by: INTERNAL MEDICINE

## 2021-07-23 PROCEDURE — 250N000011 HC RX IP 250 OP 636: Performed by: FAMILY MEDICINE

## 2021-07-23 PROCEDURE — 36415 COLL VENOUS BLD VENIPUNCTURE: CPT | Performed by: FAMILY MEDICINE

## 2021-07-23 PROCEDURE — 120N000001 HC R&B MED SURG/OB

## 2021-07-23 PROCEDURE — 250N000013 HC RX MED GY IP 250 OP 250 PS 637: Performed by: CLINICAL NURSE SPECIALIST

## 2021-07-23 PROCEDURE — 99233 SBSQ HOSP IP/OBS HIGH 50: CPT | Performed by: FAMILY MEDICINE

## 2021-07-23 PROCEDURE — 85027 COMPLETE CBC AUTOMATED: CPT | Performed by: FAMILY MEDICINE

## 2021-07-23 PROCEDURE — 250N000009 HC RX 250: Performed by: FAMILY MEDICINE

## 2021-07-23 PROCEDURE — 84460 ALANINE AMINO (ALT) (SGPT): CPT | Performed by: FAMILY MEDICINE

## 2021-07-23 PROCEDURE — 250N000013 HC RX MED GY IP 250 OP 250 PS 637: Performed by: FAMILY MEDICINE

## 2021-07-23 PROCEDURE — 84450 TRANSFERASE (AST) (SGOT): CPT | Performed by: FAMILY MEDICINE

## 2021-07-23 PROCEDURE — 83735 ASSAY OF MAGNESIUM: CPT | Performed by: FAMILY MEDICINE

## 2021-07-23 RX ORDER — AMLODIPINE BESYLATE 2.5 MG/1
2.5 TABLET ORAL DAILY
Status: DISCONTINUED | OUTPATIENT
Start: 2021-07-23 | End: 2021-07-23

## 2021-07-23 RX ORDER — HYDRALAZINE HYDROCHLORIDE 20 MG/ML
5 INJECTION INTRAMUSCULAR; INTRAVENOUS EVERY 6 HOURS PRN
Status: DISCONTINUED | OUTPATIENT
Start: 2021-07-23 | End: 2021-07-23

## 2021-07-23 RX ORDER — KETOROLAC TROMETHAMINE 30 MG/ML
30 INJECTION, SOLUTION INTRAMUSCULAR; INTRAVENOUS ONCE
Status: COMPLETED | OUTPATIENT
Start: 2021-07-23 | End: 2021-07-23

## 2021-07-23 RX ORDER — AMLODIPINE BESYLATE 2.5 MG/1
2.5 TABLET ORAL ONCE
Status: COMPLETED | OUTPATIENT
Start: 2021-07-23 | End: 2021-07-23

## 2021-07-23 RX ORDER — PHENAZOPYRIDINE HYDROCHLORIDE 100 MG/1
200 TABLET, FILM COATED ORAL
Status: DISCONTINUED | OUTPATIENT
Start: 2021-07-23 | End: 2021-07-23

## 2021-07-23 RX ORDER — SENNOSIDES 8.6 MG
2 TABLET ORAL 2 TIMES DAILY
Status: DISCONTINUED | OUTPATIENT
Start: 2021-07-23 | End: 2021-07-24 | Stop reason: HOSPADM

## 2021-07-23 RX ORDER — ALPRAZOLAM 0.25 MG
0.25 TABLET ORAL DAILY PRN
Status: DISCONTINUED | OUTPATIENT
Start: 2021-07-23 | End: 2021-07-23

## 2021-07-23 RX ORDER — HYDRALAZINE HYDROCHLORIDE 20 MG/ML
5 INJECTION INTRAMUSCULAR; INTRAVENOUS EVERY 6 HOURS PRN
Status: DISCONTINUED | OUTPATIENT
Start: 2021-07-23 | End: 2021-07-24 | Stop reason: HOSPADM

## 2021-07-23 RX ORDER — HYDROXYZINE HYDROCHLORIDE 25 MG/1
50 TABLET, FILM COATED ORAL EVERY 6 HOURS PRN
Status: DISCONTINUED | OUTPATIENT
Start: 2021-07-23 | End: 2021-07-24 | Stop reason: HOSPADM

## 2021-07-23 RX ORDER — POLYETHYLENE GLYCOL 3350 17 G/17G
17 POWDER, FOR SOLUTION ORAL DAILY
Status: DISCONTINUED | OUTPATIENT
Start: 2021-07-23 | End: 2021-07-24 | Stop reason: HOSPADM

## 2021-07-23 RX ORDER — ALPRAZOLAM 0.5 MG/1
0.12 TABLET, EXTENDED RELEASE ORAL DAILY PRN
Status: DISCONTINUED | OUTPATIENT
Start: 2021-07-23 | End: 2021-07-23

## 2021-07-23 RX ORDER — ALPRAZOLAM 0.5 MG/1
0.12 TABLET, EXTENDED RELEASE ORAL 2 TIMES DAILY PRN
Status: DISCONTINUED | OUTPATIENT
Start: 2021-07-23 | End: 2021-07-24 | Stop reason: HOSPADM

## 2021-07-23 RX ORDER — AMLODIPINE BESYLATE 5 MG/1
5 TABLET ORAL DAILY
Status: DISCONTINUED | OUTPATIENT
Start: 2021-07-24 | End: 2021-07-24 | Stop reason: HOSPADM

## 2021-07-23 RX ADMIN — HYDROMORPHONE HYDROCHLORIDE 2 MG: 2 TABLET ORAL at 16:16

## 2021-07-23 RX ADMIN — SUCRALFATE 1 G: 1 SUSPENSION ORAL at 21:41

## 2021-07-23 RX ADMIN — ALPRAZOLAM 0.12 MG: 0.25 TABLET ORAL at 16:46

## 2021-07-23 RX ADMIN — HYDROXYZINE HYDROCHLORIDE 50 MG: 25 TABLET, FILM COATED ORAL at 08:39

## 2021-07-23 RX ADMIN — AMLODIPINE BESYLATE 2.5 MG: 2.5 TABLET ORAL at 14:12

## 2021-07-23 RX ADMIN — FOLIC ACID 1 MG: 1 TABLET ORAL at 08:22

## 2021-07-23 RX ADMIN — HYDRALAZINE HYDROCHLORIDE 5 MG: 20 INJECTION INTRAMUSCULAR; INTRAVENOUS at 08:23

## 2021-07-23 RX ADMIN — KETOROLAC TROMETHAMINE 30 MG: 30 INJECTION, SOLUTION INTRAMUSCULAR; INTRAVENOUS at 18:55

## 2021-07-23 RX ADMIN — FAMOTIDINE 20 MG: 10 INJECTION, SOLUTION INTRAVENOUS at 08:23

## 2021-07-23 RX ADMIN — SENNOSIDES 2 TABLET: 8.6 TABLET, FILM COATED ORAL at 08:22

## 2021-07-23 RX ADMIN — LEVOFLOXACIN 250 MG: 250 TABLET, FILM COATED ORAL at 08:22

## 2021-07-23 RX ADMIN — HYDROMORPHONE HYDROCHLORIDE 2 MG: 2 TABLET ORAL at 07:34

## 2021-07-23 RX ADMIN — AMLODIPINE BESYLATE 2.5 MG: 2.5 TABLET ORAL at 12:42

## 2021-07-23 RX ADMIN — SUCRALFATE 1 G: 1 SUSPENSION ORAL at 07:34

## 2021-07-23 RX ADMIN — SENNOSIDES 2 TABLET: 8.6 TABLET, FILM COATED ORAL at 21:41

## 2021-07-23 RX ADMIN — ONDANSETRON 4 MG: 2 INJECTION INTRAMUSCULAR; INTRAVENOUS at 21:41

## 2021-07-23 RX ADMIN — HYDROXYZINE HYDROCHLORIDE 50 MG: 25 TABLET, FILM COATED ORAL at 21:41

## 2021-07-23 RX ADMIN — Medication 1 TABLET: at 08:22

## 2021-07-23 RX ADMIN — Medication 100 MG: at 08:22

## 2021-07-23 RX ADMIN — HYDROXYZINE HYDROCHLORIDE 50 MG: 25 TABLET, FILM COATED ORAL at 15:05

## 2021-07-23 RX ADMIN — FAMOTIDINE 20 MG: 10 INJECTION, SOLUTION INTRAVENOUS at 21:40

## 2021-07-23 RX ADMIN — HYDROXYZINE HYDROCHLORIDE 25 MG: 25 TABLET, FILM COATED ORAL at 02:35

## 2021-07-23 RX ADMIN — HYDRALAZINE HYDROCHLORIDE 5 MG: 20 INJECTION INTRAMUSCULAR; INTRAVENOUS at 17:23

## 2021-07-23 RX ADMIN — HYDROMORPHONE HYDROCHLORIDE 2 MG: 2 TABLET ORAL at 20:22

## 2021-07-23 RX ADMIN — POLYETHYLENE GLYCOL 3350 17 G: 17 POWDER, FOR SOLUTION ORAL at 08:22

## 2021-07-23 RX ADMIN — HYDROMORPHONE HYDROCHLORIDE 2 MG: 2 TABLET ORAL at 02:35

## 2021-07-23 RX ADMIN — ALPRAZOLAM 0.12 MG: 0.25 TABLET ORAL at 12:42

## 2021-07-23 RX ADMIN — SUCRALFATE 1 G: 1 SUSPENSION ORAL at 18:55

## 2021-07-23 RX ADMIN — BISACODYL SUPPOSITORY 10 MG: 10 SUPPOSITORY RECTAL at 11:10

## 2021-07-23 RX ADMIN — HYDROMORPHONE HYDROCHLORIDE 2 MG: 2 TABLET ORAL at 11:58

## 2021-07-23 RX ADMIN — SUCRALFATE 1 G: 1 SUSPENSION ORAL at 12:42
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
## 2021-07-23 NOTE — PROVIDER NOTIFICATION
Patient requesting suppository for constipation.  Thinks it's bad enough that oral medications won't be enough.

## 2021-07-23 NOTE — PLAN OF CARE
CIWA scored 2, 4 for anxiety & tremors.  Not being able to discharge and concern about elevated liver function labs increased anxiety.  Anxiety greatly improved once plan implemented for addressing liver function values.  PRN hydroxyzine 25 mg given at 2024.  Pain tolerable most of shift.  Received PRN hydromorphone 2 mg PO at 1711 and 2208.  Pain and anxiety worsened by constipation.  Patient requested suppository, which was administered.  Reported having small, hard BM afterward.  Some improvement but still feeling constipated.  BP trending high (2032 164/107, 2212 171/116).  On-call hospitalist notified.  Will continue to try alleviating patient's discomfort.    Problem: Fluid Imbalance (Pancreatitis)  Goal: Fluid Balance  Outcome: Improving     Problem: Alcohol Withdrawal  Goal: Alcohol Withdrawal Symptom Control  Outcome: Improving     Problem: Pain (Pancreatitis)  Goal: Acceptable Pain Control  Outcome: Improving

## 2021-07-23 NOTE — PROGRESS NOTES
Will not see today:  PAIN MANAGEMENT SERVICE CHART CHECK    This patient's chart has been reviewed by the Pain Service. No change in current pain management plan.  Pain Service with no additional recommendations at this time.  We will sign off.  Please contact us if you have any additional recommendations.    Thank you!    Corina GRAYSON, CNS-BC, DNP  Acute Care Pain Management Program  [facility] ([place], [place], [place])   With questions call [phone]  Preference if for [facility]  Click HERE to page [place]

## 2021-07-23 NOTE — PLAN OF CARE
Problem: Pain (Pancreatitis)  Goal: Acceptable Pain Control  Outcome: Improving     Problem: Alcohol Withdrawal  Goal: Alcohol Withdrawal Symptom Control  Outcome: Improving    Pt is A&Ox4, Independent, /104 and 143/93, VS otherwise stable. C/O mid/left-sided abdominal pain 3/10, PRN Dilaudid given x1 and effective. CIWA 2 (for anxiety) and 0, Prn Atarax given x1 for anxiety and effective. Continues to c/o constipation, had small, hard BM yesterday, MD ordered scheduled stool softeners. Call light within reach, able to verbalize needs.

## 2021-07-23 NOTE — PROGRESS NOTES
"[facility] [place]GRESS [place]      ADMIT DATE: 7/18/2021     FACILITY: [facility]    PCP: Yohana Garcia, [phone]    ASSESSMENT AND PLAN:   36 year old female with a pertinent history of alcohol abuse, pancreatitis, alcoholic hepatitis and migraines, who presents to [facility] ED via walk-in for evaluation of abdominal pain and headache.     The patient reports epigastric abdominal pain that began day prior to admission and now radiates to her back. The pain feels like the \"worst heartburn ever\" and is exacerbated by \"everything\" but particularly with taking po. She reports associated nausea and had one episode of vomiting morning of admission. She has only been able to take sips of water and has not been able to eat. Patient reports a history of pancreatitis and feels like her symptoms today are similar to this. She has previously had to be admitted for pancreatitis. No diarrhea or constipation.      Patient reports gradual onset of a typical migraine day prior to admission for which she took a Maxalt. She believes this \"set off\" her abdominal pain. Patient developed another headache this afternoon when her abdominal pain started to get more severe. She reports throbbing pain at the base of her skull radiating to the left-side of her head.      Patient additionally believes she may have a UTI. She reports decreased urination and dysuria. Of note, she has an IUD in place. LMP was 1-2 months ago.    Principal Problem:    Acute pancreatitis, unspecified complication status, unspecified pancreatitis type  Active Problems:    Urinary Tract Infection    Transaminitis    Alcohol abuse    Alcoholic intoxication with complication (H)                Elevated BP          -related to anxiety per patient          -start norvasc 5 mg PO every day          -started PO xanax 0.125 mg PO BID          -monitor BP and adjust norvasc dosage and xanax dosage as needed.        Acute alcohol pancreatitis - lipase " improving.  Consulted GI -->suspects alcohol pancreatitis.   Diet was advanced to solid diet     Alcoholic gastritis  Supportive management, pain and nausea control, patient now on solid diet  C/w PO diluadid for pain control      Alcohol abuse, intoxication  Patient was advised to completely quit drinking given transaminitis, hepatic steatosis and cytopenias   evaluation rule 25 assessment - patient declines SUDS referral  Monitor with CIWA -  is scoring 1-4 today      Hypoglycemia  Likely due to Alcohol effects  Improving  On PO solid diet now  Blood sugars have been stable.        Alcohol hepatitis/Elevated LFTs           Abdominal ultrasound showed hepatic steatosis  ALT and AST elevated but were stable--->however, on 7/22, they are trending up.-->on 7/22, discussed elevated LFTs with pharmacy who recommended discontinuing protonix and switching to pepcid and discontinuing keflex and switching to a fluoroquinolone such as levaquin. Changes made. LFTs are now trending down        Acute cystitis without hematuria  Continue cephalexin  urine culture > 100K pansensitive E. Coli-->c/w keflex     Leukopenia, thrombocytopenia  Likely temporary bone marrow suppression secondary to alcohol abuse     Migraines headache  Maxalt prn  Avoid NSAIDs per GI     Hypomagnesemia  Secondary to alcohol abuse  Replace per protocol        FEN/GI: low fat diet  DVT proph: ambulation, SCDs   Code status: Full Code      Discharge barriers:  -BP control  -ADOD: tomorrow      SUBJECTIVE:    Patient denies any complaints at this time. She would like to go home.       ROS:  12 Points review of systems reviewed and is negative except for what has already been mentioned above    OBJECTIVE:  Patient Vitals for the past 24 hrs:   BP Temp Temp src Pulse Resp SpO2   07/23/21 1602 (!) 157/102 98  F (36.7  C) Oral 77 18 100 %   07/23/21 1412 122/83 -- -- -- -- --   07/23/21 1254 (!) 149/100 -- -- -- -- --   07/23/21 1141 (!) 134/98 98   F (36.7  C) Oral 61 20 99 %   07/23/21 0930 132/86 -- -- 77 -- --   07/23/21 0745 (!) 168/106 97.8  F (36.6  C) Oral 61 18 99 %   07/23/21 0443 (!) 143/93 98.7  F (37.1  C) Oral 54 20 97 %   07/22/21 2322 (!) 164/104 98.7  F (37.1  C) Oral 54 18 98 %   07/22/21 2212 (!) 171/116 -- -- 60 -- --   07/22/21 2032 (!) 164/107 97.6  F (36.4  C) Oral 58 18 99 %          Intake/Output Summary (Last 24 hours) at 7/21/2021 0715  Last data filed at 7/20/2021 2120  Gross per 24 hour   Intake 4022 ml   Output 1625 ml   Net 2397 ml     GENRL: Alert and oriented X 3. Not in acute distress. Satting at 99% on room air.   HEENT: NC/AT      Neck- supple      Sclera- anicteric      Mucous membrane- moist and pink  CHEST: Clear to auscultation bilaterally  HEART: S1S2 regular. No murmurs, rubs or gallops  ABDMN: Soft. Non-tender on mild palpation of all quadrants. No guarding or rigidity. Bowel sounds- active  EXTRM: No pedal edema.   NEURO: No involuntary movements  INTGM: please see nursing assessment for full skin assessment  PULSES: 2+ and symmetric all extremities  PSYCH: normal affect, normal speech     DIAGNOSTIC DATA:          Recent Results (from the past 24 hour(s))   Glucose by meter    Collection Time: 07/22/21  5:05 PM   Result Value Ref Range    GLUCOSE BY METER POCT 115 70 - 125 mg/dL   Glucose by meter    Collection Time: 07/22/21  9:14 PM   Result Value Ref Range    GLUCOSE BY METER POCT 102 70 - 125 mg/dL   CBC with platelets    Collection Time: 07/23/21  7:29 AM   Result Value Ref Range    WBC Count 3.1 (L) 4.0 - 11.0 10e3/uL    RBC Count 3.33 (L) 3.80 - 5.20 10e6/uL    Hemoglobin 11.2 (L) 11.7 - 15.7 g/dL    Hematocrit 32.9 (L) 35.0 - 47.0 %    MCV 99 78 - 100 fL    MCH 33.6 (H) 26.5 - 33.0 pg    MCHC 34.0 31.5 - 36.5 g/dL    RDW 14.4 10.0 - 15.0 %    Platelet Count 103 (L) 150 - 450 10e3/uL   Comprehensive metabolic panel    Collection Time: 07/23/21  7:29 AM   Result Value Ref Range    Sodium 139 136 - 145 mmol/L     Potassium 3.7 3.5 - 5.0 mmol/L    Chloride 108 (H) 98 - 107 mmol/L    Carbon Dioxide (CO2) 26 22 - 31 mmol/L    Anion Gap 5 5 - 18 mmol/L    Urea Nitrogen 6 (L) 8 - 22 mg/dL    Creatinine 0.58 (L) 0.60 - 1.10 mg/dL    Calcium 8.5 8.5 - 10.5 mg/dL    Glucose 85 70 - 125 mg/dL    Alkaline Phosphatase 96 45 - 120 U/L     (H) 0 - 40 U/L     (H) 0 - 45 U/L    Protein Total 5.7 (L) 6.0 - 8.0 g/dL    Albumin 3.0 (L) 3.5 - 5.0 g/dL    Bilirubin Total 0.6 0.0 - 1.0 mg/dL    GFR Estimate >90 >60 mL/min/1.73m2   Magnesium    Collection Time: 07/23/21  7:29 AM   Result Value Ref Range    Magnesium 1.8 1.8 - 2.6 mg/dL   AST    Collection Time: 07/23/21 11:25 AM   Result Value Ref Range     (H) 0 - 40 U/L   ALT    Collection Time: 07/23/21 11:25 AM   Result Value Ref Range     (H) 0 - 45 U/L        Results for orders placed or performed during the hospital encounter of 07/18/21   US Abdomen Limited    Impression    IMPRESSION:  1.  Sludge in the gallbladder. No specific evidence for cholecystitis.  2.  Hepatic steatosis.       CT Abdomen Pelvis w Contrast    Impression    IMPRESSION:   1.  Subtle enhancement of right ureter raises question of ureteral inflammation, no CT evidence for pyelonephritis.  2.  Air-fluid level within the bladder, presumably from recent catheterization. Minimal bladder wall thickening.  3.  Resolution of peripancreatic edema.  4.  Prominent fatty infiltration of liver unchanged.   XR Chest 2 Views    Impression    IMPRESSION: Lungs are clear. No effusions. Heart and pulmonary vascularity are normal. No signs of acute disease.          All recent labs reviewed personally  Radiology report reviewed.         The total time spent in preparing this progress note is about 40 minutes, >50% time spent in care co-ordination that includes reviewing labs, images, discussing the plan of care with patient/family, consultants, and .      Kevin Arthur MD.   [facility]  [facility] Medicine Service   [phone]   Pager [phone]

## 2021-07-24 VITALS
TEMPERATURE: 98.2 F | HEART RATE: 67 BPM | OXYGEN SATURATION: 98 % | HEIGHT: 63 IN | SYSTOLIC BLOOD PRESSURE: 139 MMHG | RESPIRATION RATE: 16 BRPM | BODY MASS INDEX: 21.42 KG/M2 | WEIGHT: 120.9 LBS | DIASTOLIC BLOOD PRESSURE: 94 MMHG

## 2021-07-24 LAB
ALT SERPL W P-5'-P-CCNC: 217 U/L (ref 0–45)
AST SERPL W P-5'-P-CCNC: 247 U/L (ref 0–40)
HOLD SPECIMEN: NORMAL

## 2021-07-24 PROCEDURE — 84450 TRANSFERASE (AST) (SGOT): CPT | Performed by: FAMILY MEDICINE

## 2021-07-24 PROCEDURE — 250N000011 HC RX IP 250 OP 636: Performed by: INTERNAL MEDICINE

## 2021-07-24 PROCEDURE — 36415 COLL VENOUS BLD VENIPUNCTURE: CPT | Performed by: FAMILY MEDICINE

## 2021-07-24 PROCEDURE — 250N000013 HC RX MED GY IP 250 OP 250 PS 637: Performed by: HOSPITALIST

## 2021-07-24 PROCEDURE — 250N000013 HC RX MED GY IP 250 OP 250 PS 637: Performed by: INTERNAL MEDICINE

## 2021-07-24 PROCEDURE — 250N000013 HC RX MED GY IP 250 OP 250 PS 637: Performed by: CLINICAL NURSE SPECIALIST

## 2021-07-24 PROCEDURE — 84460 ALANINE AMINO (ALT) (SGPT): CPT | Performed by: FAMILY MEDICINE

## 2021-07-24 PROCEDURE — G0378 HOSPITAL OBSERVATION PER HR: HCPCS

## 2021-07-24 PROCEDURE — 250N000009 HC RX 250: Performed by: FAMILY MEDICINE

## 2021-07-24 PROCEDURE — 250N000013 HC RX MED GY IP 250 OP 250 PS 637: Performed by: FAMILY MEDICINE

## 2021-07-24 PROCEDURE — 99239 HOSP IP/OBS DSCHRG MGMT >30: CPT | Performed by: FAMILY MEDICINE

## 2021-07-24 RX ORDER — POLYETHYLENE GLYCOL 3350 17 G/17G
17 POWDER, FOR SOLUTION ORAL DAILY
Qty: 510 G | Refills: 0 | Status: SHIPPED | OUTPATIENT
Start: 2021-07-24 | End: 2021-09-13

## 2021-07-24 RX ORDER — FAMOTIDINE 40 MG/1
40 TABLET, FILM COATED ORAL DAILY
Qty: 30 TABLET | Refills: 0 | Status: SHIPPED | OUTPATIENT
Start: 2021-07-24 | End: 2021-09-13

## 2021-07-24 RX ORDER — AMLODIPINE BESYLATE 5 MG/1
5 TABLET ORAL DAILY
Qty: 15 TABLET | Refills: 0 | Status: SHIPPED | OUTPATIENT
Start: 2021-07-24 | End: 2021-08-15

## 2021-07-24 RX ORDER — LEVOFLOXACIN 250 MG/1
250 TABLET, FILM COATED ORAL DAILY
Qty: 1 TABLET | Refills: 0 | Status: SHIPPED | OUTPATIENT
Start: 2021-07-24 | End: 2021-08-15

## 2021-07-24 RX ORDER — ALPRAZOLAM 0.25 MG
0.12 TABLET ORAL 2 TIMES DAILY PRN
Qty: 6 TABLET | Refills: 0 | Status: SHIPPED | OUTPATIENT
Start: 2021-07-24 | End: 2021-08-15

## 2021-07-24 RX ORDER — HYDROMORPHONE HYDROCHLORIDE 2 MG/1
2 TABLET ORAL EVERY 4 HOURS PRN
Qty: 12 TABLET | Refills: 0 | Status: SHIPPED | OUTPATIENT
Start: 2021-07-24 | End: 2021-08-15

## 2021-07-24 RX ORDER — HYDROXYZINE HYDROCHLORIDE 50 MG/1
50 TABLET, FILM COATED ORAL EVERY 6 HOURS PRN
Qty: 120 TABLET | Refills: 0 | Status: SHIPPED | OUTPATIENT
Start: 2021-07-24 | End: 2021-09-13

## 2021-07-24 RX ADMIN — HYDROMORPHONE HYDROCHLORIDE 2 MG: 2 TABLET ORAL at 04:25

## 2021-07-24 RX ADMIN — TRAZODONE HYDROCHLORIDE 50 MG: 50 TABLET ORAL at 00:22

## 2021-07-24 RX ADMIN — HYDROXYZINE HYDROCHLORIDE 50 MG: 25 TABLET, FILM COATED ORAL at 04:25

## 2021-07-24 RX ADMIN — FAMOTIDINE 20 MG: 10 INJECTION, SOLUTION INTRAVENOUS at 08:22

## 2021-07-24 RX ADMIN — SUCRALFATE 1 G: 1 SUSPENSION ORAL at 08:21

## 2021-07-24 RX ADMIN — POLYETHYLENE GLYCOL 3350 17 G: 17 POWDER, FOR SOLUTION ORAL at 08:22

## 2021-07-24 RX ADMIN — HYDROMORPHONE HYDROCHLORIDE 2 MG: 2 TABLET ORAL at 08:48

## 2021-07-24 RX ADMIN — Medication 5 MG: at 00:22

## 2021-07-24 RX ADMIN — ALPRAZOLAM 0.12 MG: 0.25 TABLET ORAL at 08:50

## 2021-07-24 RX ADMIN — FOLIC ACID 1 MG: 1 TABLET ORAL at 08:22

## 2021-07-24 RX ADMIN — ONDANSETRON 8 MG: 2 INJECTION INTRAMUSCULAR; INTRAVENOUS at 08:21

## 2021-07-24 RX ADMIN — LEVOFLOXACIN 250 MG: 250 TABLET, FILM COATED ORAL at 08:48

## 2021-07-24 RX ADMIN — SENNOSIDES 2 TABLET: 8.6 TABLET, FILM COATED ORAL at 08:22

## 2021-07-24 RX ADMIN — Medication 1 TABLET: at 08:22

## 2021-07-24 RX ADMIN — AMLODIPINE BESYLATE 5 MG: 5 TABLET ORAL at 08:22

## 2021-07-24 RX ADMIN — HYDROMORPHONE HYDROCHLORIDE 2 MG: 2 TABLET ORAL at 00:21

## 2021-07-24 NOTE — PROGRESS NOTES
Pt was very anxious and crying when she was told she was not going to discharge. MD modified Xanax order to BID PRN and asked to give the pt another dose. Pt had previous Xanax dose @ 1242 and was too early to give another dose. Dr. Arthur said it was okay to give another dose via telephone order.

## 2021-07-24 NOTE — PLAN OF CARE
Problem: Alcohol Withdrawal  Goal: Alcohol Withdrawal Symptom Control  Outcome: No Change   CIWA assessment every 4 hours, scoring 4 and 5 for anxiety and headache. At the first assessment (2330), the pt requested to be left alone to sleep until 0415 (due at 0330) when she could have pain medicine again.    Problem: Pain (Pancreatitis)  Goal: Acceptable Pain Control  Outcome: Improving  Intervention: Monitor and Manage Pain  Recent Flowsheet Documentation  Taken 7/24/2021 0425 by Karishma Rankin, RN  Pain Management Interventions:   medication (see MAR)   emotional support   rest  Taken 7/24/2021 0021 by Karishma Rankin, RN  Pain Management Interventions:   medication (see MAR)   care clustered   emotional support   quiet environment facilitated   rest  Taken 7/23/2021 2330 by Karishma Rankin, RN  Pain Management Interventions:   distraction   emotional support   pain management plan reviewed with patient/caregiver   Pt is complaining of epigastric area pain she rates at 4/10, and headache she rates 4-5/10, receiving PRN oral Dilaudid and Hydroxyzine with some relief noted and pt able to sleep.

## 2021-07-24 NOTE — DISCHARGE SUMMARY
[facility] MEDICINE  DISCHARGE SUMMARY     Primary Care Physician: Yohana Garcia  Admission Date: 7/18/2021   Discharge Provider: Kevin Arthur MD Discharge Date: 7/24/2021   Diet:   Active Diet and Nourishment Order   Procedures     Diet     Diet       Code Status: Prior   Activity: DCACTIVITY: Activity as tolerated        Condition at Discharge: Stable     REASON FOR PRESENTATION(See Admission Note for Details)     Please refer to H&P    PRINCIPAL & ACTIVE DISCHARGE DIAGNOSES     Principal Problem:    Acute pancreatitis, unspecified complication status, unspecified pancreatitis type  Active Problems:    Urinary Tract Infection    Transaminitis    Alcohol abuse    Alcoholic intoxication with complication (H)      PENDING LABS     Unresulted Labs Ordered in the Past 30 Days of this Admission     No orders found from 6/18/2021 to 7/19/2021.            PROCEDURES ( this hospitalization only)          RECOMMENDATIONS TO OUTPATIENT PROVIDER FOR F/U VISIT     Follow-up Appointments     Follow-up and recommended labs and tests       Monitor         Follow-up and recommended labs and tests       Follow up with primary care provider, Yohana Garcia, within 2-3 days for   hospital follow- up. Monitor LFTs, lipase, WBC, platelet count, and   hemoglobin count closely. Monitor blood pressure and discontinue norvasc if needed.             Please refer to discharge orders below and bolded portions below    DISPOSITION     Home    SUMMARY OF HOSPITAL COURSE:       Elevated BP/Anxiety          -could be related to patient's anxiety          -started norvasc 5 mg PO every day          -started PO xanax 0.125 mg PO BID PRN for anxiety          -BP improved with these measures and patient will be discharged with norvasc 5 mg PO every day and a few tabs of xanax 0.125 mg PO BID.           -patient will also be discharged with atarax q 6 hours PRN for anxiety and trazodone PRN for sleep           -PCP to monitor BP in OP setting and consider discontinuing norvasc or adjust norvasc dosage. PCP to monitor patient's anxiety and consider starting patient on xanax in OP setting.         Acute alcohol pancreatitis - lipase improving.  Consulted GI -->suspects alcohol pancreatitis.   -Diet was advanced to solid diet  -PCP should monitor lipase in OP setting.        Alcoholic gastritis  Supportive management, pain and nausea control, patient now on solid diet  Pain team was consulted and adjusted patient's home diluadid dosage and patient will be discharged with a few tabs of this new diluadid dosage.   -patient will also be discharged with maalox, tums, pepcid, carafate, and zofran  -PCP should monitor pain control in OP setting.          Alcohol abuse, intoxication  -Patient was advised to completely quit drinking given transaminitis, hepatic steatosis and cytopenias  - evaluation rule 25 assessment - patient declines SUDS referral  -Patient was monitored with CIWA scoring during this admission and CIWA scoring kept decreasing as admission continued.   -Patient scoring low on CIWA prior to discharge  -patient will be discharged with daily MV, daily folic acid pill, and daily thiamine pill   -PCP should monitor alcohol use and alcohol cessation in OP setting.        Hypoglycemia  -had episode of hypoglycemia during this admission which resolved  -PCP should monitor glucose levels closely in OP setting        Alcohol hepatitis/Elevated LFTs           Abdominal ultrasound showed hepatic steatosis  ALT and AST elevated but were stable--->however, on 7/22, they are trending up.-->on 7/22, discussed elevated LFTs with pharmacy who recommended discontinuing protonix and switching to pepcid and discontinuing keflex and switching to a fluoroquinolone such as levaquin. Changes made. LFTs are now trending down   -PCP should monitor LFTs in OP setting.           Acute cystitis without hematuria  -Started on  cephalexin during this admission  -urine culture > 100K pansensitive E. Coli-->c/w keflex  -Because of elevated LFTs, patient was switched to levaquin and needed one more dose of levaquin on discharge  -PCP should monitor for recurrent UTIs in OP setting.        Leukopenia, thrombocytopenia  Likely temporary bone marrow suppression secondary to alcohol abuse  -PCP should monitor WBC and platelet count closely in OP setting       Migraines headache  C/w home Maxalt prn on discharge  Avoid NSAIDs per GI-->stop ibuprofen at home on discharge       Hypomagnesemia  Secondary to alcohol abuse  -PCP should monitor Mag level in OP setting.       Constipation  -patient will be discharged with miralax and pericolace  -PCP should monitor constipation in OP setting.     Patient was found to be medically stable for discharge on 7/24/2021 to home. Patient will continue with all her home meds except for the medication changes listed above.       Discharge Medications with Med changes:     Discharge Medication List as of 7/24/2021  9:24 AM      START taking these medications    Details   ALPRAZolam (XANAX) 0.25 MG tablet Take 0.5 tablets (0.125 mg) by mouth 2 times daily as needed for anxiety, Disp-6 tablet, R-0, Local Print      alum & mag hydroxide-simethicone (MAALOX) 200-200-20 MG/5ML SUSP suspension Take 30 mLs by mouth every 4 hours as needed for indigestion, Disp-100 mL, R-0, E-Prescribe      amLODIPine (NORVASC) 5 MG tablet Take 1 tablet (5 mg) by mouth daily, Disp-15 tablet, R-0, E-Prescribe      calcium carbonate (TUMS) 500 MG chewable tablet Take 1 tablet (500 mg) by mouth daily as needed for heartburn, Disp-30 tablet, R-0, E-Prescribe      famotidine (PEPCID) 40 MG tablet Take 1 tablet (40 mg) by mouth daily, Disp-30 tablet, R-0, E-Prescribe      folic acid (FOLVITE) 1 MG tablet Take 1 tablet (1 mg) by mouth daily, Disp-30 tablet, R-0, E-Prescribe      hydrOXYzine (ATARAX) 50 MG tablet Take 1 tablet (50 mg) by mouth  every 6 hours as needed for anxiety, Disp-120 tablet, R-0, E-Prescribe      levofloxacin (LEVAQUIN) 250 MG tablet Take 1 tablet (250 mg) by mouth daily, Disp-1 tablet, R-0, E-Prescribe      multivitamin w/minerals (THERA-VIT-M) tablet Take 1 tablet by mouth daily, Disp-30 tablet, R-0, E-Prescribe      ondansetron (ZOFRAN-ODT) 4 MG ODT tab Take 1 tablet (4 mg) by mouth every 6 hours as needed for nausea or vomiting, Disp-30 tablet, R-0, E-Prescribe      polyethylene glycol (MIRALAX) 17 GM/Dose powder Take 17 g by mouth daily, Disp-510 g, R-0, E-Prescribe      senna-docusate (SENOKOT-S/PERICOLACE) 8.6-50 MG tablet Take 1 tablet by mouth At Bedtime Hold for loose stools, Disp-30 tablet, R-0, E-Prescribe      sucralfate (CARAFATE) 1 GM/10ML suspension Take 10 mLs (1 g) by mouth 4 times daily (before meals and nightly), Disp-120 mL, R-0, E-Prescribe      thiamine (B-1) 100 MG tablet Take 1 tablet (100 mg) by mouth daily, Disp-30 tablet, R-0, E-Prescribe      traZODone (DESYREL) 50 MG tablet Take 1 tablet (50 mg) by mouth nightly as needed for sleep, Disp-30 tablet, R-0, E-Prescribe         CONTINUE these medications which have CHANGED    Details   HYDROmorphone (DILAUDID) 2 MG tablet Take 1 tablet (2 mg) by mouth every 4 hours as needed for severe pain, Disp-12 tablet, R-0, Local Print         CONTINUE these medications which have NOT CHANGED    Details   melatonin 5 MG tablet Take 1 tablet (5 mg) by mouth every evening as needed for sleep, Disp-30 tablet, R-0, E-Prescribe      rizatriptan (MAXALT-MLT) 10 MG ODT Take 10 mg by mouth every 2 hours as needed for migraine, Historical         STOP taking these medications       cephALEXin (KEFLEX) 500 MG capsule Comments:   Reason for Stopping:         ibuprofen (ADVIL/MOTRIN) 200 MG tablet Comments:   Reason for Stopping:                     Rationale for medication changes:      Please refer to Summary of Hospital Course Section          Consults       SOCIAL WORK IP  CONSULT  SOCIAL WORK IP CONSULT  GASTROENTEROLOGY IP CONSULT  PAIN MANAGEMENT ADULT IP CONSULT  PHARMACY IP CONSULT    Immunizations given this encounter     Most Recent Immunizations   Administered Date(s) Administered     COVID-19,PF,Pfizer 05/22/2021     FLU 6-35 months 09/29/2010, 09/29/2010     MMR 01/08/2011     Td,adult,historic,unspecified 04/01/2009           Anticoagulation Information      Recent INR results:   Recent Labs   Lab 07/21/21  0834 07/19/21  0751   INR 1.02 1.15     Warfarin doses (if applicable) or name of other anticoagulant: none      SIGNIFICANT IMAGING FINDINGS     Results for orders placed or performed during the hospital encounter of 07/18/21   US Abdomen Limited    Impression    IMPRESSION:  1.  Sludge in the gallbladder. No specific evidence for cholecystitis.  2.  Hepatic steatosis.       CT Abdomen Pelvis w Contrast    Impression    IMPRESSION:   1.  Subtle enhancement of right ureter raises question of ureteral inflammation, no CT evidence for pyelonephritis.  2.  Air-fluid level within the bladder, presumably from recent catheterization. Minimal bladder wall thickening.  3.  Resolution of peripancreatic edema.  4.  Prominent fatty infiltration of liver unchanged.   XR Chest 2 Views    Impression    IMPRESSION: Lungs are clear. No effusions. Heart and pulmonary vascularity are normal. No signs of acute disease.       SIGNIFICANT LABORATORY FINDINGS     Refer to labs in patient's chart.      Discharge Orders        Follow-up and recommended labs and tests     Monitor     Activity    Your activity upon discharge: activity as tolerated     Activity    Your activity upon discharge: activity as tolerated     Reason for your hospital stay    Acute pancreatitis and alcohol withdrawal     Follow-up and recommended labs and tests     Follow up with primary care provider, Yohana Garcia, within 2-3 days for hospital follow- up. Monitor LFTs, lipase, WBC, platelet count, and hemoglobin count  closely. Monitor blood pressure and discontinue norvasc if needed.     Follow-up and recommended labs and tests     Monitor LFTs, lipase, WBC, platelet count, and hemoglobin count closely     Diet    Follow this diet upon discharge: Orders Placed This Encounter      Low Fat Diet     Diet    Follow this diet upon discharge: Orders Placed This Encounter      Low Fat Diet       Examination   Physical Exam   Temp:  [97.8  F (36.6  C)-98.2  F (36.8  C)] 98.2  F (36.8  C)  Pulse:  [] 67  Resp:  [16-18] 16  BP: (112-139)/(82-94) 139/94  SpO2:  [98 %] 98 %  Wt Readings from Last 1 Encounters:   07/19/21 54.8 kg (120 lb 14.4 oz)     GENRL: Alert and oriented X 3. Not in acute distress. Satting at 98% on room air.   HEENT: NC/AT                 Neck- supple                 Sclera- anicteric                 Mucous membrane- moist and pink  CHEST: Clear to auscultation bilaterally  HEART: S1S2 regular. No murmurs, rubs or gallops  ABDMN: Soft. Non-tender on mild palpation of all quadrants. No guarding or rigidity. Bowel sounds- active  EXTRM: No pedal edema.   NEURO: No involuntary movements  INTGM: please see nursing assessment for full skin assessment  PULSES: 2+ and symmetric all extremities  PSYCH: normal affect, normal speech       Please see EMR for more detailed significant labs, imaging, consultant notes etc.    Kevin WORKMAN MD, personally saw the patient today and spent greater than 30 minutes discharging this patient.    Kevin Arthru MD  [facility]    CC:Yohana Garcia

## 2021-07-24 NOTE — PROGRESS NOTES
Pt was upset that she was not able to discharge at 1600 because of her BP still being high. Pt was very emotional and was crying and stated her elevated BPs and anxiety are not improving because she is not able to discharge and misses her kids. Pt reported she's having family issues which are also not helping her anxiety/BPs. Pt received another dose of xanax and understood that she had to stay one more night to stabilize BPs before going home.  Pt c/o a headache, page house resident and one time order of IV toradol was given.

## 2021-07-24 NOTE — DISCHARGE SUMMARY
Pt c/o headache and nausea, PRN pain med and anti-emetic given per request. Pt reported some relief in headache and nausea before discharging.   Pt's BP was 139/94, MD aware and is okay with pt discharging to home. Pt's discharge meds were given to her. Pt's cephalexin was discontinued and was taken out of discharge medications bag. Pt also preferred to  other medications at the [facility] pharmacy and not wait as her ride was here. Pt was also given the medication transcripts for dilaudid and xanax and pt will fill them at the [facility] pharmacy. AVS printed and reviewed with pt. Reviewed which meds to take next and which ones she took this morning. Answered pt's other questions about discharge instructions.  MD also wrote a note for work and that was given to her prior to discharge.   Pt was happy to discharge and was picked up by her boyfriend.

## 2021-07-24 NOTE — PLAN OF CARE
Problem: Adult Inpatient Plan of Care  Goal: Patient-Specific Goal (Individualized)  Outcome: Adequate for Discharge  Goal: Absence of Hospital-Acquired Illness or Injury  Outcome: Adequate for Discharge  Intervention: Identify and Manage Fall Risk  Recent Flowsheet Documentation  Taken 7/24/2021 0900 by Gerson Grant RN  Safety Promotion/Fall Prevention:   clutter free environment maintained   assistive device/personal items within reach  Goal: Optimal Comfort and Wellbeing  Outcome: Adequate for Discharge  Goal: Readiness for Transition of Care  Outcome: Adequate for Discharge     Problem: Fluid Imbalance (Pancreatitis)  Goal: Fluid Balance  Outcome: Adequate for Discharge     Problem: Infection (Pancreatitis)  Goal: Infection Symptom Resolution  Outcome: Adequate for Discharge     Problem: Nutrition Impaired (Pancreatitis)  Goal: Optimal Nutrition Intake  Outcome: Adequate for Discharge     Problem: Pain (Pancreatitis)  Goal: Acceptable Pain Control  Outcome: Adequate for Discharge  Intervention: Monitor and Manage Pain  Recent Flowsheet Documentation  Taken 7/24/2021 0848 by Gerson Grant RN  Pain Management Interventions: medication (see MAR)     Problem: Respiratory Compromise (Pancreatitis)  Goal: Effective Oxygenation and Ventilation  Outcome: Adequate for Discharge     Problem: Alcohol Withdrawal  Goal: Alcohol Withdrawal Symptom Control  Outcome: Adequate for Discharge     Problem: Acute Neurologic Deterioration (Alcohol Withdrawal)  Goal: Optimal Neurologic Function  Outcome: Adequate for Discharge     Problem: Substance Misuse (Alcohol Withdrawal)  Goal: Readiness for Change Identified  Outcome: Adequate for Discharge

## 2021-07-24 NOTE — PROGRESS NOTES
Pt is on CIWA Protocol with vital signs due again at 0330. Pt refuses to be woken at that time, wants staff to wait until 0415 when she can have pain medication again, wants to sleep as much as possible overnight.

## 2021-08-10 ENCOUNTER — HOSPITAL ENCOUNTER (EMERGENCY)
Facility: CLINIC | Age: 36
Discharge: HOME | End: 2021-08-10
Attending: EMERGENCY MEDICINE | Admitting: EMERGENCY MEDICINE
Payer: COMMERCIAL

## 2021-08-10 ENCOUNTER — APPOINTMENT (OUTPATIENT)
Dept: CT IMAGING | Facility: CLINIC | Age: 36
End: 2021-08-10
Attending: EMERGENCY MEDICINE
Payer: COMMERCIAL

## 2021-08-10 VITALS
OXYGEN SATURATION: 99 % | HEART RATE: 99 BPM | RESPIRATION RATE: 16 BRPM | DIASTOLIC BLOOD PRESSURE: 92 MMHG | TEMPERATURE: 97.3 F | SYSTOLIC BLOOD PRESSURE: 124 MMHG

## 2021-08-10 DIAGNOSIS — R51.9 NONINTRACTABLE HEADACHE, UNSPECIFIED CHRONICITY PATTERN, UNSPECIFIED HEADACHE TYPE: ICD-10-CM

## 2021-08-10 LAB
ALBUMIN SERPL-MCNC: 4 G/DL (ref 3.4–5)
ALP SERPL-CCNC: 83 U/L (ref 40–150)
ALT SERPL W P-5'-P-CCNC: 173 U/L (ref 0–50)
ANION GAP SERPL CALCULATED.3IONS-SCNC: 15 MMOL/L (ref 3–14)
AST SERPL W P-5'-P-CCNC: 191 U/L (ref 0–45)
BASOPHILS # BLD AUTO: 0 10E3/UL (ref 0–0.2)
BASOPHILS NFR BLD AUTO: 1 %
BILIRUB DIRECT SERPL-MCNC: 0.3 MG/DL (ref 0–0.2)
BILIRUB SERPL-MCNC: 0.8 MG/DL (ref 0.2–1.3)
BUN SERPL-MCNC: 7 MG/DL (ref 7–30)
CALCIUM SERPL-MCNC: 8.5 MG/DL (ref 8.5–10.1)
CHLORIDE BLD-SCNC: 103 MMOL/L (ref 94–109)
CO2 SERPL-SCNC: 18 MMOL/L (ref 20–32)
CREAT SERPL-MCNC: 0.55 MG/DL (ref 0.52–1.04)
EOSINOPHIL # BLD AUTO: 0 10E3/UL (ref 0–0.7)
EOSINOPHIL NFR BLD AUTO: 1 %
ERYTHROCYTE [DISTWIDTH] IN BLOOD BY AUTOMATED COUNT: 13.8 % (ref 10–15)
ETHANOL SERPL-MCNC: 0.33 G/DL
GFR SERPL CREATININE-BSD FRML MDRD: >90 ML/MIN/1.73M2
GLUCOSE BLD-MCNC: 79 MG/DL (ref 70–99)
HCT VFR BLD AUTO: 38.2 % (ref 35–47)
HGB BLD-MCNC: 12.8 G/DL (ref 11.7–15.7)
IMM GRANULOCYTES # BLD: 0 10E3/UL
IMM GRANULOCYTES NFR BLD: 0 %
LIPASE SERPL-CCNC: 43 U/L (ref 73–393)
LYMPHOCYTES # BLD AUTO: 1.7 10E3/UL (ref 0.8–5.3)
LYMPHOCYTES NFR BLD AUTO: 40 %
MCH RBC QN AUTO: 32.7 PG (ref 26.5–33)
MCHC RBC AUTO-ENTMCNC: 33.5 G/DL (ref 31.5–36.5)
MCV RBC AUTO: 98 FL (ref 78–100)
MONOCYTES # BLD AUTO: 0.3 10E3/UL (ref 0–1.3)
MONOCYTES NFR BLD AUTO: 7 %
NEUTROPHILS # BLD AUTO: 2.2 10E3/UL (ref 1.6–8.3)
NEUTROPHILS NFR BLD AUTO: 51 %
NRBC # BLD AUTO: 0 10E3/UL
NRBC BLD AUTO-RTO: 0 /100
PLATELET # BLD AUTO: 150 10E3/UL (ref 150–450)
POTASSIUM BLD-SCNC: 3.7 MMOL/L (ref 3.4–5.3)
PROT SERPL-MCNC: 7.5 G/DL (ref 6.8–8.8)
RBC # BLD AUTO: 3.91 10E6/UL (ref 3.8–5.2)
SARS-COV-2 RNA RESP QL NAA+PROBE: NEGATIVE
SODIUM SERPL-SCNC: 136 MMOL/L (ref 133–144)
WBC # BLD AUTO: 4.2 10E3/UL (ref 4–11)

## 2021-08-10 PROCEDURE — 258N000003 HC RX IP 258 OP 636: Performed by: EMERGENCY MEDICINE

## 2021-08-10 PROCEDURE — 83690 ASSAY OF LIPASE: CPT | Performed by: EMERGENCY MEDICINE

## 2021-08-10 PROCEDURE — 85025 COMPLETE CBC W/AUTO DIFF WBC: CPT | Performed by: EMERGENCY MEDICINE

## 2021-08-10 PROCEDURE — 82077 ASSAY SPEC XCP UR&BREATH IA: CPT | Performed by: EMERGENCY MEDICINE

## 2021-08-10 PROCEDURE — 96375 TX/PRO/DX INJ NEW DRUG ADDON: CPT

## 2021-08-10 PROCEDURE — 96361 HYDRATE IV INFUSION ADD-ON: CPT

## 2021-08-10 PROCEDURE — 250N000011 HC RX IP 250 OP 636: Performed by: EMERGENCY MEDICINE

## 2021-08-10 PROCEDURE — 82040 ASSAY OF SERUM ALBUMIN: CPT | Performed by: EMERGENCY MEDICINE

## 2021-08-10 PROCEDURE — 80048 BASIC METABOLIC PNL TOTAL CA: CPT | Performed by: EMERGENCY MEDICINE

## 2021-08-10 PROCEDURE — 87635 SARS-COV-2 COVID-19 AMP PRB: CPT | Performed by: EMERGENCY MEDICINE

## 2021-08-10 PROCEDURE — C9803 HOPD COVID-19 SPEC COLLECT: HCPCS

## 2021-08-10 PROCEDURE — 96374 THER/PROPH/DIAG INJ IV PUSH: CPT

## 2021-08-10 PROCEDURE — 93005 ELECTROCARDIOGRAM TRACING: CPT

## 2021-08-10 PROCEDURE — 70450 CT HEAD/BRAIN W/O DYE: CPT

## 2021-08-10 PROCEDURE — 36415 COLL VENOUS BLD VENIPUNCTURE: CPT | Performed by: EMERGENCY MEDICINE

## 2021-08-10 PROCEDURE — 99285 EMERGENCY DEPT VISIT HI MDM: CPT | Mod: 25

## 2021-08-10 RX ORDER — DEXAMETHASONE SODIUM PHOSPHATE 10 MG/ML
10 INJECTION, SOLUTION INTRAMUSCULAR; INTRAVENOUS ONCE
Status: COMPLETED | OUTPATIENT
Start: 2021-08-10 | End: 2021-08-10

## 2021-08-10 RX ORDER — KETOROLAC TROMETHAMINE 15 MG/ML
15 INJECTION, SOLUTION INTRAMUSCULAR; INTRAVENOUS ONCE
Status: COMPLETED | OUTPATIENT
Start: 2021-08-10 | End: 2021-08-10

## 2021-08-10 RX ORDER — DIPHENHYDRAMINE HYDROCHLORIDE 50 MG/ML
25 INJECTION INTRAMUSCULAR; INTRAVENOUS ONCE
Status: COMPLETED | OUTPATIENT
Start: 2021-08-10 | End: 2021-08-10

## 2021-08-10 RX ADMIN — SODIUM CHLORIDE 1000 ML: 9 INJECTION, SOLUTION INTRAVENOUS at 15:29

## 2021-08-10 RX ADMIN — DIPHENHYDRAMINE HYDROCHLORIDE 25 MG: 50 INJECTION INTRAMUSCULAR; INTRAVENOUS at 12:52

## 2021-08-10 RX ADMIN — SODIUM CHLORIDE 1000 ML: 9 INJECTION, SOLUTION INTRAVENOUS at 12:51

## 2021-08-10 RX ADMIN — PROCHLORPERAZINE EDISYLATE 10 MG: 5 INJECTION INTRAMUSCULAR; INTRAVENOUS at 12:51

## 2021-08-10 RX ADMIN — DEXAMETHASONE SODIUM PHOSPHATE 10 MG: 10 INJECTION, SOLUTION INTRAMUSCULAR; INTRAVENOUS at 12:52

## 2021-08-10 RX ADMIN — KETOROLAC TROMETHAMINE 15 MG: 15 INJECTION, SOLUTION INTRAMUSCULAR; INTRAVENOUS at 16:08

## 2021-08-10 ASSESSMENT — ENCOUNTER SYMPTOMS
WEAKNESS: 1
NECK PAIN: 1
HEADACHES: 1
FEVER: 0
TREMORS: 1
VOMITING: 0
SHORTNESS OF BREATH: 1
ABDOMINAL PAIN: 0
NAUSEA: 1
CHEST TIGHTNESS: 1

## 2021-08-10 NOTE — ED PROVIDER NOTES
History   Chief Complaint:  Headache and Nausea     The history is provided by the patient.      Fabiola Barrera is a 36 year old female with history of depression, anxiety, and migraines who presents with headache and nausea. The patient reports that her migraine started last night but has felt different than any headache or migraine she has had in the past. The headache is localized at the base of her skull and behind her eyes, and she also reports seeing black spots in both eyes. She took ibuprofen at 0530 this morning without relief. She notes chest tightness and intermittent shortness of breath but says this could be attributed to her anxiety. She also endorses weakness, tremors, nausea, and neck pain. Her normal migraines concentrate on one spot in her head and do not affect the rest of her body. Her last migraine was two weeks ago. She denies fever, vomiting, or abdominal pain. She has not been around anyone that is sick or has COVID, and she has been vaccinated for COVID. She does report drinking alcohol last night but says she has reduced her drinking lately.    Review of Systems   Constitutional: Negative for fever.   Eyes: Positive for visual disturbance.   Respiratory: Positive for chest tightness and shortness of breath.    Gastrointestinal: Positive for nausea. Negative for abdominal pain and vomiting.   Musculoskeletal: Positive for neck pain.   Neurological: Positive for tremors, weakness and headaches.   All other systems reviewed and are negative.    Allergies:  Phenazopyridine  Oxycodone-Acetaminophen  Prochlorperazine     Medications:  Inderal  Zoloft  Xanax    Past Medical History:    Depression  Migraine  Pancreatitis  Alcoholic hepatitis  Transaminitis  Alcohol abuse  Anxiety  UTI  Kidney infection    Past Surgical History:     section  [place] teeth extraction  Suction D&C    Family History:    Migraines, father  Depression, brother  Alcohol abuse, brother    Social History:  Alcohol  use: yes  PCP: Yohana Garcia  Presents to the ED alone.    Physical Exam     Patient Vitals for the past 24 hrs:   BP Temp Temp src Pulse Resp SpO2   08/10/21 1610 -- -- -- -- -- 99 %   08/10/21 1237 (!) 124/92 97.3  F (36.3  C) Temporal 99 16 99 %       Physical Exam    General: Patient is alert and interactive when I enter the room  Head:  The scalp, face, and head appear normal  Eyes:  Conjunctivae are normal  ENT:    The nose is normal    Pinnae are normal    External acoustic canals are normal  Neck:  Trachea midline, good ROM, no rigidity   CV:  Pulses are normal. RRR.   Resp:  No respiratory distress. CTAB.   Abdomen:      Soft, non-tender, non-distended  Musc:  Normal muscular tone    No major joint effusions    No asymmetric leg swelling  Skin:  No rash or lesions noted  Neuro:  Speech is normal and fluent. Face is symmetric.     Moving all extremities well.   Psych: Awake. Alert.  Normal affect.  Appropriate interactions.     Emergency Department Course     ECG:  ECG taken at 1556, ECG read at 1600  Normal sinus rhythm with sinus arrhythmia  Normal ECG   Rate 72 bpm. MD interval 122 ms. QRS duration 76 ms. QT/QTc 400/438 ms. P-R-T axes 38 45 43.    Imaging:  Head CT w/o contrast:  Diffuse cerebral volume loss and cerebral white matter   changes consistent with chronic small vessel ischemic disease. No   evidence for acute intracranial pathology.     Reading per radiology    Laboratory:  CBC: WBC 4.2, HGB 12.8,    BMP: Carbon Dioxide 18(L), Anion Gap 15(H), o/w WNL (Creatinine: 0.55)    Hepatic Panel: Bilirubin Direct: 0.3(H), ALT: 173(H), AST: 191(H), o/w Negative  Lipase: 43(L)  Alcohol ethyl: 0.33  Symptomatic COVID-19 PCR: Negative    Emergency Department Course:  Reviewed:  I reviewed the patient's nursing notes, vitals, past medical records, Care Everywhere.     Assessments:  1516 I performed an assessment and examination of the patient as noted above.    1715 Findings and plan explained to the  Patient. Patient discharged home with instructions regarding supportive care, medications, and reasons to return. The importance of close follow-up was reviewed.    Interventions:  1251 NS 1L IV Bolus   1251 Compazine 10 mg, IV  1252 Decadron 10 mg, IV  1252 Benadryl 25 mg, IV  1608 Toradol 15 mg, IV    Disposition:  The patient was discharged to home.     Impression & Plan   Medical Decision Making:  Fabiola Barrera is a 36 year old female who presents with headache.  Patient has a long history of alcohol abuse and was recently hospitalized for alcohol hepatitis and pancreatitis.  Patient however is here for mostly her headache.  She is afebrile and otherwise well-appearing.  She does have a red flags of her headache including abrupt onset or fevers.  She otherwise appears well and she admits to alcohol which I suspect is likely related to some of her symptoms.  Her blood work revealed a mild anion gap acidosis likely alcoholic ketoacidosis.  Patient was given 2 L of fluid multiple medication instead felt improved.  Her blood alcohol returned at 0.33.  Patient appeared clinically sober and had a ride to come pick her up.  At this point I think she be discharged we did do a head CT with her history of alcohol abuse and sure that she does not have subdural hemorrhage.  This was normal.  Patient just was discharged.      Covid-19  Fabiola Barrera was evaluated during a global COVID-19 pandemic, which necessitated consideration that the patient might be at risk for infection with the SARS-CoV-2 virus that causes COVID-19.   Applicable protocols for evaluation were followed during the patient's care.   COVID-19 was considered as part of the patient's evaluation. The plan for testing is:  a test was obtained during this visit.    Diagnosis:    ICD-10-CM    1. Nonintractable headache, unspecified chronicity pattern, unspecified headache type  R51.9      Scribe Disclosure:  Mamta WORKMAN, am serving as a scribe at 2:56 PM  on 8/10/2021 to document services personally performed by Judie Garcia MD based on my observations and the provider's statements to me.       Judie Garcia MD  08/10/21 7255

## 2021-08-10 NOTE — ED TRIAGE NOTES
Presents via EMS from home with headache and nausea since last night. Reports headache to base of posterior skull and benhind eyes with photophobia and visual sensitivity. Endorses hx of migraines. 4mg Zofran administered in route with no relief. VSS on RA.

## 2021-08-11 LAB
ATRIAL RATE - MUSE: 72 BPM
DIASTOLIC BLOOD PRESSURE - MUSE: NORMAL MMHG
INTERPRETATION ECG - MUSE: NORMAL
P AXIS - MUSE: 38 DEGREES
PR INTERVAL - MUSE: 122 MS
QRS DURATION - MUSE: 76 MS
QT - MUSE: 400 MS
QTC - MUSE: 438 MS
R AXIS - MUSE: 45 DEGREES
SYSTOLIC BLOOD PRESSURE - MUSE: NORMAL MMHG
T AXIS - MUSE: 43 DEGREES
VENTRICULAR RATE- MUSE: 72 BPM

## 2021-08-15 ENCOUNTER — HOSPITAL ENCOUNTER (INPATIENT)
Facility: HOSPITAL | Age: 36
LOS: 4 days | Discharge: HOME | End: 2021-08-19
Attending: FAMILY MEDICINE | Admitting: HOSPITALIST
Payer: COMMERCIAL

## 2021-08-15 ENCOUNTER — APPOINTMENT (OUTPATIENT)
Dept: CT IMAGING | Facility: HOSPITAL | Age: 36
End: 2021-08-15
Attending: FAMILY MEDICINE
Payer: COMMERCIAL

## 2021-08-15 DIAGNOSIS — F10.20 ALCOHOL USE DISORDER, SEVERE, DEPENDENCE (H): Primary | ICD-10-CM

## 2021-08-15 DIAGNOSIS — K70.10 ALCOHOLIC HEPATITIS WITHOUT ASCITES (H): ICD-10-CM

## 2021-08-15 DIAGNOSIS — F10.930 ALCOHOL WITHDRAWAL SEIZURE WITHOUT COMPLICATION (H): ICD-10-CM

## 2021-08-15 DIAGNOSIS — D69.6 THROMBOCYTOPENIA (H): ICD-10-CM

## 2021-08-15 DIAGNOSIS — S00.03XA HEMATOMA OF SCALP, INITIAL ENCOUNTER: ICD-10-CM

## 2021-08-15 DIAGNOSIS — R56.9 ALCOHOL WITHDRAWAL SEIZURE WITHOUT COMPLICATION (H): ICD-10-CM

## 2021-08-15 LAB
AMMONIA PLAS-SCNC: 36 UMOL/L (ref 11–35)
AMPHETAMINES UR QL SCN: NORMAL
ANION GAP SERPL CALCULATED.3IONS-SCNC: 12 MMOL/L (ref 5–18)
BARBITURATES UR QL: NORMAL
BASOPHILS # BLD AUTO: 0 10E3/UL (ref 0–0.2)
BASOPHILS NFR BLD AUTO: 0 %
BENZODIAZ UR QL: NORMAL
BUN SERPL-MCNC: 6 MG/DL (ref 8–22)
CALCIUM SERPL-MCNC: 9.2 MG/DL (ref 8.5–10.5)
CANNABINOIDS UR QL SCN: NORMAL
CHLORIDE BLD-SCNC: 99 MMOL/L (ref 98–107)
CO2 SERPL-SCNC: 20 MMOL/L (ref 22–31)
COCAINE UR QL: NORMAL
CREAT SERPL-MCNC: 0.69 MG/DL (ref 0.6–1.1)
CREAT UR-MCNC: 60 MG/DL
EOSINOPHIL # BLD AUTO: 0 10E3/UL (ref 0–0.7)
EOSINOPHIL NFR BLD AUTO: 1 %
ERYTHROCYTE [DISTWIDTH] IN BLOOD BY AUTOMATED COUNT: 12.8 % (ref 10–15)
ETHANOL SERPL-MCNC: <10 MG/DL
GFR SERPL CREATININE-BSD FRML MDRD: >90 ML/MIN/1.73M2
GLUCOSE BLD-MCNC: 119 MG/DL (ref 70–125)
HCT VFR BLD AUTO: 33.2 % (ref 35–47)
HGB BLD-MCNC: 11.7 G/DL (ref 11.7–15.7)
IMM GRANULOCYTES # BLD: 0 10E3/UL
IMM GRANULOCYTES NFR BLD: 0 %
INR PPP: 1.08 (ref 0.85–1.15)
LYMPHOCYTES # BLD AUTO: 0.7 10E3/UL (ref 0.8–5.3)
LYMPHOCYTES NFR BLD AUTO: 23 %
MAGNESIUM SERPL-MCNC: 1.7 MG/DL (ref 1.8–2.6)
MCH RBC QN AUTO: 33.6 PG (ref 26.5–33)
MCHC RBC AUTO-ENTMCNC: 35.2 G/DL (ref 31.5–36.5)
MCV RBC AUTO: 95 FL (ref 78–100)
METHADONE UR QL SCN: NORMAL
MONOCYTES # BLD AUTO: 0.4 10E3/UL (ref 0–1.3)
MONOCYTES NFR BLD AUTO: 13 %
NEUTROPHILS # BLD AUTO: 1.8 10E3/UL (ref 1.6–8.3)
NEUTROPHILS NFR BLD AUTO: 63 %
NRBC # BLD AUTO: 0 10E3/UL
NRBC BLD AUTO-RTO: 0 /100
OPIATES UR QL SCN: NORMAL
OXYCODONE UR QL: NORMAL
PCP UR QL SCN: NORMAL
PLATELET # BLD AUTO: 66 10E3/UL (ref 150–450)
POTASSIUM BLD-SCNC: 3.6 MMOL/L (ref 3.5–5)
RBC # BLD AUTO: 3.48 10E6/UL (ref 3.8–5.2)
SARS-COV-2 RNA RESP QL NAA+PROBE: NEGATIVE
SODIUM SERPL-SCNC: 131 MMOL/L (ref 136–145)
WBC # BLD AUTO: 2.9 10E3/UL (ref 4–11)

## 2021-08-15 PROCEDURE — 120N000001 HC R&B MED SURG/OB

## 2021-08-15 PROCEDURE — 85610 PROTHROMBIN TIME: CPT | Performed by: FAMILY MEDICINE

## 2021-08-15 PROCEDURE — 80307 DRUG TEST PRSMV CHEM ANLYZR: CPT | Performed by: FAMILY MEDICINE

## 2021-08-15 PROCEDURE — 82248 BILIRUBIN DIRECT: CPT | Performed by: HOSPITALIST

## 2021-08-15 PROCEDURE — 70450 CT HEAD/BRAIN W/O DYE: CPT

## 2021-08-15 PROCEDURE — 83735 ASSAY OF MAGNESIUM: CPT | Performed by: FAMILY MEDICINE

## 2021-08-15 PROCEDURE — 85025 COMPLETE CBC W/AUTO DIFF WBC: CPT | Performed by: FAMILY MEDICINE

## 2021-08-15 PROCEDURE — 80048 BASIC METABOLIC PNL TOTAL CA: CPT | Performed by: FAMILY MEDICINE

## 2021-08-15 PROCEDURE — 96375 TX/PRO/DX INJ NEW DRUG ADDON: CPT

## 2021-08-15 PROCEDURE — 82077 ASSAY SPEC XCP UR&BREATH IA: CPT | Performed by: FAMILY MEDICINE

## 2021-08-15 PROCEDURE — 87635 SARS-COV-2 COVID-19 AMP PRB: CPT | Performed by: FAMILY MEDICINE

## 2021-08-15 PROCEDURE — 250N000011 HC RX IP 250 OP 636: Performed by: FAMILY MEDICINE

## 2021-08-15 PROCEDURE — 96374 THER/PROPH/DIAG INJ IV PUSH: CPT

## 2021-08-15 PROCEDURE — 250N000013 HC RX MED GY IP 250 OP 250 PS 637: Performed by: FAMILY MEDICINE

## 2021-08-15 PROCEDURE — 99285 EMERGENCY DEPT VISIT HI MDM: CPT | Mod: 25

## 2021-08-15 PROCEDURE — 93005 ELECTROCARDIOGRAM TRACING: CPT

## 2021-08-15 PROCEDURE — C9803 HOPD COVID-19 SPEC COLLECT: HCPCS

## 2021-08-15 PROCEDURE — 84100 ASSAY OF PHOSPHORUS: CPT | Performed by: HOSPITALIST

## 2021-08-15 PROCEDURE — 36415 COLL VENOUS BLD VENIPUNCTURE: CPT | Performed by: FAMILY MEDICINE

## 2021-08-15 PROCEDURE — 82140 ASSAY OF AMMONIA: CPT | Performed by: FAMILY MEDICINE

## 2021-08-15 RX ORDER — KETOROLAC TROMETHAMINE 15 MG/ML
15 INJECTION, SOLUTION INTRAMUSCULAR; INTRAVENOUS ONCE
Status: COMPLETED | OUTPATIENT
Start: 2021-08-15 | End: 2021-08-15

## 2021-08-15 RX ORDER — MORPHINE SULFATE 2 MG/ML
2 INJECTION, SOLUTION INTRAMUSCULAR; INTRAVENOUS ONCE
Status: COMPLETED | OUTPATIENT
Start: 2021-08-16 | End: 2021-08-16

## 2021-08-15 RX ORDER — DIAZEPAM 10 MG/2ML
2.5 INJECTION, SOLUTION INTRAMUSCULAR; INTRAVENOUS ONCE
Status: COMPLETED | OUTPATIENT
Start: 2021-08-15 | End: 2021-08-15

## 2021-08-15 RX ORDER — ACETAMINOPHEN 325 MG/1
650 TABLET ORAL ONCE
Status: COMPLETED | OUTPATIENT
Start: 2021-08-15 | End: 2021-08-15

## 2021-08-15 RX ORDER — DIAZEPAM 10 MG/2ML
2.5 INJECTION, SOLUTION INTRAMUSCULAR; INTRAVENOUS ONCE
Status: COMPLETED | OUTPATIENT
Start: 2021-08-16 | End: 2021-08-16

## 2021-08-15 RX ADMIN — KETOROLAC TROMETHAMINE 15 MG: 15 INJECTION, SOLUTION INTRAMUSCULAR; INTRAVENOUS at 22:47

## 2021-08-15 RX ADMIN — ACETAMINOPHEN 650 MG: 325 TABLET ORAL at 21:02

## 2021-08-15 RX ADMIN — DIAZEPAM 2.5 MG: 5 INJECTION, SOLUTION INTRAMUSCULAR; INTRAVENOUS at 21:02

## 2021-08-15 ASSESSMENT — ENCOUNTER SYMPTOMS
TREMORS: 1
SEIZURES: 1
WEAKNESS: 1
RHINORRHEA: 0
HEADACHES: 1
COUGH: 0
VOMITING: 0
DIARRHEA: 0

## 2021-08-15 ASSESSMENT — MIFFLIN-ST. JEOR: SCORE: 1185.77

## 2021-08-16 ENCOUNTER — APPOINTMENT (OUTPATIENT)
Dept: MRI IMAGING | Facility: HOSPITAL | Age: 36
End: 2021-08-16
Attending: PSYCHIATRY & NEUROLOGY
Payer: COMMERCIAL

## 2021-08-16 ENCOUNTER — HOSPITAL ENCOUNTER (INPATIENT)
Dept: NEUROLOGY | Facility: HOSPITAL | Age: 36
End: 2021-08-16
Attending: HOSPITALIST
Payer: COMMERCIAL

## 2021-08-16 PROBLEM — D69.6 THROMBOCYTOPENIA (H): Status: ACTIVE | Noted: 2021-08-16

## 2021-08-16 LAB
ALBUMIN SERPL-MCNC: 4 G/DL (ref 3.5–5)
ALP SERPL-CCNC: 71 U/L (ref 45–120)
ALT SERPL W P-5'-P-CCNC: 125 U/L (ref 0–45)
AST SERPL W P-5'-P-CCNC: 130 U/L (ref 0–40)
BILIRUB DIRECT SERPL-MCNC: 0.6 MG/DL
BILIRUB SERPL-MCNC: 1.5 MG/DL (ref 0–1)
PHOSPHATE SERPL-MCNC: 3.1 MG/DL (ref 2.5–4.5)
PROT SERPL-MCNC: 7 G/DL (ref 6–8)

## 2021-08-16 PROCEDURE — 95816 EEG AWAKE AND DROWSY: CPT

## 2021-08-16 PROCEDURE — 99207 PR APP CREDIT; MD BILLING SHARED VISIT: CPT | Performed by: INTERNAL MEDICINE

## 2021-08-16 PROCEDURE — 250N000011 HC RX IP 250 OP 636: Performed by: HOSPITALIST

## 2021-08-16 PROCEDURE — 250N000013 HC RX MED GY IP 250 OP 250 PS 637: Performed by: HOSPITALIST

## 2021-08-16 PROCEDURE — 255N000002 HC RX 255 OP 636: Performed by: PSYCHIATRY & NEUROLOGY

## 2021-08-16 PROCEDURE — 250N000011 HC RX IP 250 OP 636: Performed by: FAMILY MEDICINE

## 2021-08-16 PROCEDURE — 84425 ASSAY OF VITAMIN B-1: CPT | Performed by: PSYCHIATRY & NEUROLOGY

## 2021-08-16 PROCEDURE — 120N000001 HC R&B MED SURG/OB

## 2021-08-16 PROCEDURE — 99223 1ST HOSP IP/OBS HIGH 75: CPT | Mod: AI | Performed by: HOSPITALIST

## 2021-08-16 PROCEDURE — 36415 COLL VENOUS BLD VENIPUNCTURE: CPT | Performed by: PSYCHIATRY & NEUROLOGY

## 2021-08-16 PROCEDURE — C9803 HOPD COVID-19 SPEC COLLECT: HCPCS

## 2021-08-16 PROCEDURE — 99255 IP/OBS CONSLTJ NEW/EST HI 80: CPT | Performed by: PSYCHIATRY & NEUROLOGY

## 2021-08-16 PROCEDURE — 96376 TX/PRO/DX INJ SAME DRUG ADON: CPT

## 2021-08-16 PROCEDURE — A9585 GADOBUTROL INJECTION: HCPCS | Performed by: PSYCHIATRY & NEUROLOGY

## 2021-08-16 PROCEDURE — 250N000013 HC RX MED GY IP 250 OP 250 PS 637: Performed by: INTERNAL MEDICINE

## 2021-08-16 PROCEDURE — 250N000013 HC RX MED GY IP 250 OP 250 PS 637: Performed by: FAMILY MEDICINE

## 2021-08-16 PROCEDURE — 70553 MRI BRAIN STEM W/O & W/DYE: CPT

## 2021-08-16 PROCEDURE — 250N000011 HC RX IP 250 OP 636: Performed by: INTERNAL MEDICINE

## 2021-08-16 PROCEDURE — HZ2ZZZZ DETOXIFICATION SERVICES FOR SUBSTANCE ABUSE TREATMENT: ICD-10-PCS | Performed by: HOSPITALIST

## 2021-08-16 PROCEDURE — 96361 HYDRATE IV INFUSION ADD-ON: CPT

## 2021-08-16 PROCEDURE — 95816 EEG AWAKE AND DROWSY: CPT | Mod: 26 | Performed by: PSYCHIATRY & NEUROLOGY

## 2021-08-16 PROCEDURE — 96375 TX/PRO/DX INJ NEW DRUG ADDON: CPT

## 2021-08-16 RX ORDER — ONDANSETRON 2 MG/ML
4 INJECTION INTRAMUSCULAR; INTRAVENOUS EVERY 6 HOURS PRN
Status: DISCONTINUED | OUTPATIENT
Start: 2021-08-16 | End: 2021-08-19 | Stop reason: HOSPADM

## 2021-08-16 RX ORDER — DIAZEPAM 5 MG
10 TABLET ORAL EVERY 30 MIN PRN
Status: DISCONTINUED | OUTPATIENT
Start: 2021-08-16 | End: 2021-08-16

## 2021-08-16 RX ORDER — LORAZEPAM 1 MG/1
1 TABLET ORAL EVERY 30 MIN PRN
Status: DISCONTINUED | OUTPATIENT
Start: 2021-08-16 | End: 2021-08-17

## 2021-08-16 RX ORDER — CLONIDINE HYDROCHLORIDE 0.1 MG/1
0.1 TABLET ORAL EVERY 8 HOURS
Status: DISCONTINUED | OUTPATIENT
Start: 2021-08-16 | End: 2021-08-18

## 2021-08-16 RX ORDER — GABAPENTIN 300 MG/1
1200 CAPSULE ORAL ONCE
Status: COMPLETED | OUTPATIENT
Start: 2021-08-16 | End: 2021-08-16

## 2021-08-16 RX ORDER — HYDROMORPHONE HCL IN WATER/PF 6 MG/30 ML
0.2 PATIENT CONTROLLED ANALGESIA SYRINGE INTRAVENOUS
Status: DISCONTINUED | OUTPATIENT
Start: 2021-08-16 | End: 2021-08-19 | Stop reason: HOSPADM

## 2021-08-16 RX ORDER — NALOXONE HYDROCHLORIDE 0.4 MG/ML
0.4 INJECTION, SOLUTION INTRAMUSCULAR; INTRAVENOUS; SUBCUTANEOUS
Status: DISCONTINUED | OUTPATIENT
Start: 2021-08-16 | End: 2021-08-19 | Stop reason: HOSPADM

## 2021-08-16 RX ORDER — PROCHLORPERAZINE 25 MG
25 SUPPOSITORY, RECTAL RECTAL EVERY 12 HOURS PRN
Status: DISCONTINUED | OUTPATIENT
Start: 2021-08-16 | End: 2021-08-19 | Stop reason: HOSPADM

## 2021-08-16 RX ORDER — PROCHLORPERAZINE MALEATE 10 MG
10 TABLET ORAL EVERY 6 HOURS PRN
Status: DISCONTINUED | OUTPATIENT
Start: 2021-08-16 | End: 2021-08-19 | Stop reason: HOSPADM

## 2021-08-16 RX ORDER — MORPHINE SULFATE 2 MG/ML
2 INJECTION, SOLUTION INTRAMUSCULAR; INTRAVENOUS ONCE
Status: COMPLETED | OUTPATIENT
Start: 2021-08-16 | End: 2021-08-16

## 2021-08-16 RX ORDER — GABAPENTIN 300 MG/1
600 CAPSULE ORAL EVERY 8 HOURS
Status: DISCONTINUED | OUTPATIENT
Start: 2021-08-19 | End: 2021-08-17

## 2021-08-16 RX ORDER — LORAZEPAM 2 MG/ML
1 INJECTION INTRAMUSCULAR EVERY 30 MIN PRN
Status: DISCONTINUED | OUTPATIENT
Start: 2021-08-16 | End: 2021-08-17

## 2021-08-16 RX ORDER — FAMOTIDINE 10 MG
10 TABLET ORAL 2 TIMES DAILY
Status: DISCONTINUED | OUTPATIENT
Start: 2021-08-16 | End: 2021-08-19 | Stop reason: HOSPADM

## 2021-08-16 RX ORDER — FLUMAZENIL 0.1 MG/ML
0.2 INJECTION, SOLUTION INTRAVENOUS
Status: DISCONTINUED | OUTPATIENT
Start: 2021-08-16 | End: 2021-08-19 | Stop reason: HOSPADM

## 2021-08-16 RX ORDER — HALOPERIDOL 5 MG/ML
2 INJECTION INTRAMUSCULAR EVERY 6 HOURS PRN
Status: DISCONTINUED | OUTPATIENT
Start: 2021-08-16 | End: 2021-08-19 | Stop reason: HOSPADM

## 2021-08-16 RX ORDER — GADOBUTROL 604.72 MG/ML
5 INJECTION INTRAVENOUS ONCE
Status: COMPLETED | OUTPATIENT
Start: 2021-08-16 | End: 2021-08-16

## 2021-08-16 RX ORDER — IBUPROFEN 600 MG/1
600 TABLET, FILM COATED ORAL EVERY 6 HOURS PRN
Status: DISCONTINUED | OUTPATIENT
Start: 2021-08-16 | End: 2021-08-19 | Stop reason: HOSPADM

## 2021-08-16 RX ORDER — LORAZEPAM 2 MG/ML
0.5 INJECTION INTRAMUSCULAR ONCE
Status: COMPLETED | OUTPATIENT
Start: 2021-08-16 | End: 2021-08-16

## 2021-08-16 RX ORDER — SODIUM CHLORIDE AND POTASSIUM CHLORIDE 150; 900 MG/100ML; MG/100ML
INJECTION, SOLUTION INTRAVENOUS CONTINUOUS
Status: DISPENSED | OUTPATIENT
Start: 2021-08-16 | End: 2021-08-16

## 2021-08-16 RX ORDER — LIDOCAINE 40 MG/G
CREAM TOPICAL
Status: CANCELLED | OUTPATIENT
Start: 2021-08-16

## 2021-08-16 RX ORDER — OLANZAPINE 5 MG/1
5-10 TABLET, ORALLY DISINTEGRATING ORAL EVERY 6 HOURS PRN
Status: DISCONTINUED | OUTPATIENT
Start: 2021-08-16 | End: 2021-08-16

## 2021-08-16 RX ORDER — LORAZEPAM 0.5 MG/1
1-2 TABLET ORAL EVERY 30 MIN PRN
Status: DISCONTINUED | OUTPATIENT
Start: 2021-08-16 | End: 2021-08-16

## 2021-08-16 RX ORDER — NALOXONE HYDROCHLORIDE 0.4 MG/ML
0.2 INJECTION, SOLUTION INTRAMUSCULAR; INTRAVENOUS; SUBCUTANEOUS
Status: DISCONTINUED | OUTPATIENT
Start: 2021-08-16 | End: 2021-08-19 | Stop reason: HOSPADM

## 2021-08-16 RX ORDER — MAGNESIUM OXIDE 400 MG/1
400 TABLET ORAL DAILY
Status: DISCONTINUED | OUTPATIENT
Start: 2021-08-16 | End: 2021-08-18

## 2021-08-16 RX ORDER — MULTIVITAMIN,THERAPEUTIC
1 TABLET ORAL ONCE
Status: COMPLETED | OUTPATIENT
Start: 2021-08-16 | End: 2021-08-16

## 2021-08-16 RX ORDER — GABAPENTIN 100 MG/1
100 CAPSULE ORAL EVERY 8 HOURS
Status: DISCONTINUED | OUTPATIENT
Start: 2021-08-23 | End: 2021-08-17

## 2021-08-16 RX ORDER — DIAZEPAM 10 MG/2ML
5-10 INJECTION, SOLUTION INTRAMUSCULAR; INTRAVENOUS EVERY 30 MIN PRN
Status: DISCONTINUED | OUTPATIENT
Start: 2021-08-16 | End: 2021-08-16

## 2021-08-16 RX ORDER — LORAZEPAM 2 MG/ML
1-2 INJECTION INTRAMUSCULAR EVERY 30 MIN PRN
Status: DISCONTINUED | OUTPATIENT
Start: 2021-08-16 | End: 2021-08-16

## 2021-08-16 RX ORDER — FOLIC ACID 1 MG/1
1 TABLET ORAL ONCE
Status: COMPLETED | OUTPATIENT
Start: 2021-08-16 | End: 2021-08-16

## 2021-08-16 RX ORDER — OLANZAPINE 5 MG/1
5 TABLET, ORALLY DISINTEGRATING ORAL EVERY 6 HOURS PRN
Status: DISCONTINUED | OUTPATIENT
Start: 2021-08-16 | End: 2021-08-19 | Stop reason: HOSPADM

## 2021-08-16 RX ORDER — GABAPENTIN 300 MG/1
900 CAPSULE ORAL EVERY 8 HOURS
Status: DISCONTINUED | OUTPATIENT
Start: 2021-08-16 | End: 2021-08-17

## 2021-08-16 RX ORDER — HALOPERIDOL 5 MG/ML
2.5-5 INJECTION INTRAMUSCULAR EVERY 6 HOURS PRN
Status: DISCONTINUED | OUTPATIENT
Start: 2021-08-16 | End: 2021-08-16

## 2021-08-16 RX ORDER — ONDANSETRON 4 MG/1
4 TABLET, ORALLY DISINTEGRATING ORAL EVERY 6 HOURS PRN
Status: DISCONTINUED | OUTPATIENT
Start: 2021-08-16 | End: 2021-08-19 | Stop reason: HOSPADM

## 2021-08-16 RX ORDER — GABAPENTIN 300 MG/1
300 CAPSULE ORAL EVERY 8 HOURS
Status: DISCONTINUED | OUTPATIENT
Start: 2021-08-21 | End: 2021-08-17

## 2021-08-16 RX ADMIN — THERA TABS 1 TABLET: TAB at 02:18

## 2021-08-16 RX ADMIN — HYDROMORPHONE HYDROCHLORIDE 1 MG: 2 TABLET ORAL at 14:40

## 2021-08-16 RX ADMIN — MORPHINE SULFATE 2 MG: 2 INJECTION, SOLUTION INTRAMUSCULAR; INTRAVENOUS at 00:01

## 2021-08-16 RX ADMIN — IBUPROFEN 600 MG: 600 TABLET ORAL at 07:45

## 2021-08-16 RX ADMIN — LORAZEPAM 0.5 MG: 2 INJECTION INTRAMUSCULAR; INTRAVENOUS at 15:19

## 2021-08-16 RX ADMIN — POTASSIUM CHLORIDE AND SODIUM CHLORIDE: 900; 150 INJECTION, SOLUTION INTRAVENOUS at 02:09

## 2021-08-16 RX ADMIN — CLONIDINE HYDROCHLORIDE 0.1 MG: 0.1 TABLET ORAL at 09:02

## 2021-08-16 RX ADMIN — GABAPENTIN 1200 MG: 300 CAPSULE ORAL at 02:16

## 2021-08-16 RX ADMIN — CLONIDINE HYDROCHLORIDE 0.1 MG: 0.1 TABLET ORAL at 17:20

## 2021-08-16 RX ADMIN — MAGNESIUM OXIDE TAB 400 MG (241.3 MG ELEMENTAL MG) 400 MG: 400 (241.3 MG) TAB at 09:01

## 2021-08-16 RX ADMIN — FAMOTIDINE 10 MG: 10 TABLET ORAL at 02:17

## 2021-08-16 RX ADMIN — GABAPENTIN 900 MG: 300 CAPSULE ORAL at 17:18

## 2021-08-16 RX ADMIN — CLONIDINE HYDROCHLORIDE 0.1 MG: 0.1 TABLET ORAL at 02:15

## 2021-08-16 RX ADMIN — LORAZEPAM 2 MG: 0.5 TABLET ORAL at 10:30

## 2021-08-16 RX ADMIN — LORAZEPAM 2 MG: 1 TABLET ORAL at 17:29

## 2021-08-16 RX ADMIN — HYDROMORPHONE HYDROCHLORIDE 0.2 MG: 0.2 INJECTION, SOLUTION INTRAMUSCULAR; INTRAVENOUS; SUBCUTANEOUS at 19:19

## 2021-08-16 RX ADMIN — MORPHINE SULFATE 2 MG: 2 INJECTION, SOLUTION INTRAMUSCULAR; INTRAVENOUS at 10:52

## 2021-08-16 RX ADMIN — GADOBUTROL 5 ML: 604.72 INJECTION INTRAVENOUS at 16:13

## 2021-08-16 RX ADMIN — LORAZEPAM 1 MG: 1 TABLET ORAL at 20:35

## 2021-08-16 RX ADMIN — FAMOTIDINE 10 MG: 10 TABLET ORAL at 19:49

## 2021-08-16 RX ADMIN — DIAZEPAM 2.5 MG: 5 INJECTION, SOLUTION INTRAMUSCULAR; INTRAVENOUS at 00:01

## 2021-08-16 RX ADMIN — ONDANSETRON 4 MG: 2 INJECTION INTRAMUSCULAR; INTRAVENOUS at 20:35

## 2021-08-16 RX ADMIN — MAGNESIUM OXIDE TAB 400 MG (241.3 MG ELEMENTAL MG) 400 MG: 400 (241.3 MG) TAB at 02:19

## 2021-08-16 RX ADMIN — Medication 100 MG: at 02:15

## 2021-08-16 RX ADMIN — LORAZEPAM 1 MG: 2 INJECTION INTRAMUSCULAR; INTRAVENOUS at 11:51

## 2021-08-16 RX ADMIN — FAMOTIDINE 10 MG: 10 TABLET ORAL at 09:01

## 2021-08-16 RX ADMIN — GABAPENTIN 900 MG: 300 CAPSULE ORAL at 09:02

## 2021-08-16 RX ADMIN — FOLIC ACID 1 MG: 1 TABLET ORAL at 02:16

## 2021-08-16 ASSESSMENT — ACTIVITIES OF DAILY LIVING (ADL)
DIFFICULTY_EATING/SWALLOWING: NO
TOILETING_ISSUES: NO
FALL_HISTORY_WITHIN_LAST_SIX_MONTHS: YES
DIFFICULTY_COMMUNICATING: NO
NUMBER_OF_TIMES_PATIENT_HAS_FALLEN_WITHIN_LAST_SIX_MONTHS: 1
WHICH_OF_THE_ABOVE_FUNCTIONAL_RISKS_HAD_A_RECENT_ONSET_OR_CHANGE?: FALL HISTORY
WEAR_GLASSES_OR_BLIND: YES
DRESSING/BATHING_DIFFICULTY: NO
WALKING_OR_CLIMBING_STAIRS_DIFFICULTY: NO
PATIENT_/_FAMILY_COMMUNICATION_STYLE: SPOKEN LANGUAGE (ENGLISH OR BILINGUAL)
HEARING_DIFFICULTY_OR_DEAF: NO
DOING_ERRANDS_INDEPENDENTLY_DIFFICULTY: NO
CONCENTRATING,_REMEMBERING_OR_MAKING_DECISIONS_DIFFICULTY: NO

## 2021-08-16 NOTE — CONSULTS
NEUROLOGY INPATIENT CONSULTATION NOTE       [facility]  [address] [place] [address]  Tel: [phone]  Fax: [phone]  [email or web address]     Fabiola Barrera,  1985, MRN [id number]  PCP: Yohana Garcia  Date: 2021     ASSESSMENT & PLAN     Diagnosis code: Seizure    Alcohol withdrawal seizure    CT of the head unremarkable    MRI brain with and without contrast    EEG    Check thiamine level and start on high-dose thiamine supplements    CIWA protocol    No need for anticonvulsants unless EEG shows abnormality    Thank you again for this referral, please feel free to contact me if you have any questions.    Kris Kenney MD  [facility]  (Formerly, [facility]     CHIEF COMPLAINT Alcohol withdrawal seizure without complication (H)     HISTORY OF PRESENT ILLNESS     We have been requested by Dr. Blake to evaluate Fabiola Barrera who is a 36 year old  female for alcohol withdrawal seizure    Patient is a 36-year-old female with history of anxiety, migraine headache, alcoholic hepatitis who was brought to the emergency room for possible seizure.  Patient was drinking more in the last few weeks and stopped drinking 3 days ago.  She was somewhat jittery and felt shaky.  She had a seizure that witnessed by her family members.  She bit her tongue.  Also she lost control of bladder.  She was brought to the emergency room and had a CT of the head that was unremarkable.  She denies any past history of seizures.  She was started on alcohol withdrawal protocol and neurology was consulted.Lab work included low sodium with borderline elevated ammonia.  Alcohol level was less than 10 white count was low at 2.9 AST and ALT were elevated urine toxicology screen was negative.  Patient has long history of alcohol abuse and in the past went through chemical dependency treatment at [facility].  She started drinking  at a fairly young age and intermittently will binge drink.  Although she had suddenly stopped drinking in the past, this is the first time she had a seizure     PROBLEM LIST      Patient Active Problem List   Diagnosis Code     Skin: A Rash R21     Lymphadenopathy R59.9     Fatigue R53.81, R53.83     Generalized Anxiety Disorder F41.1     Urinary Tract Infection N39.0     Pain During Urination (Dysuria) R30.0     Acute Pharyngitis J02.9     Alcohol-induced acute pancreatitis, unspecified complication status K85.20     Transaminitis R74.01     History of migraine Z86.69     Alcohol abuse F10.10     Depression, unspecified depression type F32.9     Alcoholic hepatitis without ascites K70.10     Alcoholic intoxication with complication (H) F10.929     Acute pancreatitis, unspecified complication status, unspecified pancreatitis type K85.90     Hematoma of scalp, initial encounter S00.03XA     Alcohol withdrawal seizure without complication (H) F10.230, R56.9        PAST MEDICAL & SURGICAL HISTORY     Past Medical History: Patient  has a past medical history of Depression, Depression, Migraine, Migraine, Pancreatitis, and Pancreatitis.    Past Surgical History: She  has a past surgical history that includes  Section.     SOCIAL HISTORY     Reviewed, and she  reports that she has been smoking. She has never used smokeless tobacco. She reports current alcohol use. She reports that she does not use drugs.     FAMILY HISTORY     Reviewed, and family history includes Nephrolithiasis in her maternal grandfather.     ALLERGIES     Allergies   Allergen Reactions     Phenazopyridine Anaphylaxis     Oxycodone-Acetaminophen Rash     Tolerates hydrocodone-APAP        REVIEW OF SYSTEMS     Pertinent items are noted in HPI.     HOME & HOSPITAL MEDICATIONS     Prior to Admission Medications  (Not in a hospital admission)      Hospital Medications    cloNIDine  0.1 mg Oral Q8H     famotidine  10 mg Oral BID     [START ON  8/23/2021] gabapentin  100 mg Oral Q8H     [START ON 8/21/2021] gabapentin  300 mg Oral Q8H     [START ON 8/19/2021] gabapentin  600 mg Oral Q8H     gabapentin  900 mg Oral Q8H     magnesium oxide  400 mg Oral Daily        PHYSICAL EXAM     Vital signs  Temp:  [97.8  F (36.6  C)-98.5  F (36.9  C)] 97.8  F (36.6  C)  Pulse:  [66-98] 81  Resp:  [19-24] 20  BP: (128-184)/() 131/98  SpO2:  [98 %-100 %] 100 %    Weight:   Wt Readings from Last 1 Encounters:   08/15/21 52.2 kg (115 lb)        General Physical Exam: Patient is alert and oriented x 3. Vital signs were reviewed and are documented in EMR. Neck was supple, no carotid bruit, thyromegaly, JVD or lymphadenopathy noted.  Neurological Exam:  Mental status: Alert and oriented x3 no acute distress  Speech: Normal with no dysarthria or aphasia  Cranial nerves: 2 through 12 intact.  She has contusion of the tongue  Motor: Normal mass and tone with 5/5 strength  Reflexes: 1+ with downgoing toes  Sensory: Intact light touch pinprick  Coordination: Dysmetria bilaterally on finger-nose testing  Gait: Deferred for safety     DIAGNOSTIC STUDIES     Pertinent Radiology   Radiology Results: Reviewed impression and images     CT  1.  Mild, generalized atrophy. No focal volume loss.     2.  Scalp swelling. No fracture.     3.  Negative for acute intracranial process.    MRI  pending    Pertinent Labs   Lab Results: Personally Reviewed   Recent Results (from the past 24 hour(s))   INR    Collection Time: 08/15/21  8:17 PM   Result Value Ref Range    INR 1.08 0.85 - 1.15   Basic metabolic panel    Collection Time: 08/15/21  8:17 PM   Result Value Ref Range    Sodium 131 (L) 136 - 145 mmol/L    Potassium 3.6 3.5 - 5.0 mmol/L    Chloride 99 98 - 107 mmol/L    Carbon Dioxide (CO2) 20 (L) 22 - 31 mmol/L    Anion Gap 12 5 - 18 mmol/L    Urea Nitrogen 6 (L) 8 - 22 mg/dL    Creatinine 0.69 0.60 - 1.10 mg/dL    Calcium 9.2 8.5 - 10.5 mg/dL    Glucose 119 70 - 125 mg/dL    GFR  Estimate >90 >60 mL/min/1.73m2   Ammonia    Collection Time: 08/15/21  8:17 PM   Result Value Ref Range    Ammonia 36 (H) 11 - 35 umol/L   Magnesium    Collection Time: 08/15/21  8:17 PM   Result Value Ref Range    Magnesium 1.7 (L) 1.8 - 2.6 mg/dL   Ethyl Alcohol Level    Collection Time: 08/15/21  8:17 PM   Result Value Ref Range    Alcohol, Blood <10 None detected mg/dL   CBC with platelets and differential    Collection Time: 08/15/21  8:17 PM   Result Value Ref Range    WBC Count 2.9 (L) 4.0 - 11.0 10e3/uL    RBC Count 3.48 (L) 3.80 - 5.20 10e6/uL    Hemoglobin 11.7 11.7 - 15.7 g/dL    Hematocrit 33.2 (L) 35.0 - 47.0 %    MCV 95 78 - 100 fL    MCH 33.6 (H) 26.5 - 33.0 pg    MCHC 35.2 31.5 - 36.5 g/dL    RDW 12.8 10.0 - 15.0 %    Platelet Count 66 (L) 150 - 450 10e3/uL    % Neutrophils 63 %    % Lymphocytes 23 %    % Monocytes 13 %    % Eosinophils 1 %    % Basophils 0 %    % Immature Granulocytes 0 %    NRBCs per 100 WBC 0 <1 /100    Absolute Neutrophils 1.8 1.6 - 8.3 10e3/uL    Absolute Lymphocytes 0.7 (L) 0.8 - 5.3 10e3/uL    Absolute Monocytes 0.4 0.0 - 1.3 10e3/uL    Absolute Eosinophils 0.0 0.0 - 0.7 10e3/uL    Absolute Basophils 0.0 0.0 - 0.2 10e3/uL    Absolute Immature Granulocytes 0.0 <=0.0 10e3/uL    Absolute NRBCs 0.0 10e3/uL   Hepatic panel    Collection Time: 08/15/21  8:17 PM   Result Value Ref Range    Bilirubin Total 1.5 (H) 0.0 - 1.0 mg/dL    Bilirubin Direct 0.6 (H) <=0.5 mg/dL    Protein Total 7.0 6.0 - 8.0 g/dL    Albumin 4.0 3.5 - 5.0 g/dL    Alkaline Phosphatase 71 45 - 120 U/L     (H) 0 - 40 U/L     (H) 0 - 45 U/L   Phosphorus    Collection Time: 08/15/21  8:17 PM   Result Value Ref Range    Phosphorus 3.1 2.5 - 4.5 mg/dL   Drugs of Abuse 1+ Panel, Urine ([facility] Only)    Collection Time: 08/15/21  9:58 PM   Result Value Ref Range    Amphetamines Urine Screen Negative Screen Negative    Benzodiazepines Urine Screen Negative Screen Negative    Opiates Urine Screen Negative  Screen Negative    PCP Urine Screen Negative Screen Negative    Cannabinoids Urine Screen Negative Screen Negative    Barbiturates Urine Screen Negative Screen Negative    Cocaine Urine Screen Negative Screen Negative    Methadone Urine Screen Negative Screen Negative    Oxycodone Urine Screen Negative Screen Negative    Creatinine Urine mg/dL 60 mg/dL   Asymptomatic COVID-19 Virus (Coronavirus) by PCR Nose    Collection Time: 08/15/21 10:28 PM    Specimen: Nose; Swab   Result Value Ref Range    SARS CoV2 PCR Negative Negative       Total time spent for face to face visit, reviewing labs/imaging studies, counseling and coordination of care was: 1 Hour 15 Minutes More than 50% of this time was spent on counseling and coordination of care.      This note was dictated using voice recognition software.  Any grammatical or context distortions are unintentional and inherent to the software.

## 2021-08-16 NOTE — ED TRIAGE NOTES
Patient presents via EMS post seizure activity at home. Patient was having dinner and started to not feel well and experienced a seizure that lasted 5 minutes.Patient did hit back of head; no thinners. Patient has no seizure history, but has a ETOH history. Stopped drinking last Friday.

## 2021-08-16 NOTE — PROGRESS NOTES
Daily Progress Note        CODE STATUS:  Full code    08/16/21  Assessment/Plan:  Fabiola Barrera is a 36 year old female admitted on 8/15/2021. She has history of alcohol pancreatitis, alcohol hepatitis, hepatic steatosis, anxiety and migraines.  She takes hydroxyzine and Maxalt as needed.  She has been drinking 2 glasses of wine most days over the last few weeks and then drank 1 bottle of wine daily on August 12 and 13.  She has not drank alcohol since then and felt shaky before she fell backwards and had a seizure.     1.  Alcohol withdrawal seizure  - No previous history of seizure  - CT head showed mild generalized atrophy, no focal volume loss  - Checking EEG and MRI per neurology  - Neurochecks, seizure precautions  - Neurology consult appreciated     2.  Alcohol abuse, withdrawals  - CIWA protocol, as needed lorazepam  - Clonidine, gabapentin  - Multivitamin, thiamine, folic acid  - Patient had been to [facility] in 2018 after which she was sober for about 6 months. She is not sure if she would like inpatient CD treatment. She would like to discuss this with her parents.  consulted     3.  Hepatic steatosis  - Recommend alcohol cessation  - Will benefit from chemical dependency treatment     4.  Cytopenias  - WBC 2.9, platelets 66  - Likely bone marrow suppression in the setting of alcohol abuse  - Monitor     5.  Hyponatremia  - Secondary to volume depletion  - IV hydration     6.  Hypomagnesemia  - Secondary to alcohol abuse  - Oral magnesium        Diet: Combination Diet Regular Diet Adult    DVT Prophylaxis: Pneumatic Compression Devices  [place] Catheter: Not present  Central Lines: None  Code Status:  Full code      Disposition; Home with outpatient treament vs inpatient CD in couple of days  Barrier to discharge; alcohol withdrawal     LOS: 1 day     Subjective:  Interval History: Patient seen and examined this morning. Patient was tremulous, but oriented x 3. No hallucinations. Patient  reporting headaches. Notes, labs, imaging reports personally reviewed.    Review of Systems:   As mentioned in subjective.    Patient Active Problem List   Diagnosis     Skin: A Rash     Lymphadenopathy     Fatigue     Generalized Anxiety Disorder     Urinary Tract Infection     Pain During Urination (Dysuria)     Acute Pharyngitis     Alcohol-induced acute pancreatitis, unspecified complication status     Transaminitis     History of migraine     Alcohol abuse     Depression, unspecified depression type     Alcoholic hepatitis without ascites     Alcoholic intoxication with complication (H)     Acute pancreatitis, unspecified complication status, unspecified pancreatitis type     Hematoma of scalp, initial encounter     Alcohol withdrawal seizure without complication (H)     Thrombocytopenia (H)       Scheduled Meds:    cloNIDine  0.1 mg Oral Q8H     famotidine  10 mg Oral BID     [START ON 8/23/2021] gabapentin  100 mg Oral Q8H     [START ON 8/21/2021] gabapentin  300 mg Oral Q8H     [START ON 8/19/2021] gabapentin  600 mg Oral Q8H     gabapentin  900 mg Oral Q8H     gadobutrol  5 mL Intravenous Once     magnesium oxide  400 mg Oral Daily     Continuous Infusions:  PRN Meds:.flumazenil, OLANZapine zydis **OR** haloperidol lactate, HYDROmorphone, ibuprofen, LORazepam **OR** LORazepam, melatonin, naloxone **OR** naloxone **OR** naloxone **OR** naloxone, ondansetron **OR** ondansetron, prochlorperazine **OR** prochlorperazine **OR** prochlorperazine    Objective:  Vital signs in last 24 hours:  Temp:  [97.6  F (36.4  C)-98.5  F (36.9  C)] 98  F (36.7  C)  Pulse:  [56-98] 74  Resp:  [14-24] 18  BP: (101-184)/() 101/65  SpO2:  [98 %-100 %] 98 %      Intake/Output Summary (Last 24 hours) at 8/16/2021 1606  Last data filed at 8/16/2021 1500  Gross per 24 hour   Intake 962 ml   Output --   Net 962 ml       Physical Exam:    General: Not in obvious distress. Anxious. Tremulous.  HEENT: NC,AT except for tongue injury  (bruise at the tongue tip) that happened during the seizure.   Chest: Clear to auscultation bilaterally  Heart: S1S2 normal, regular. No M/R/G  Abdomen: Soft. NT, ND. Bowel sounds- active.  Extremities: No legs swelling  Neuro: alert and awake, grossly non-focal      Lab Results:(I have personally reviewed the results)    Recent Results (from the past 24 hour(s))   INR    Collection Time: 08/15/21  8:17 PM   Result Value Ref Range    INR 1.08 0.85 - 1.15   Basic metabolic panel    Collection Time: 08/15/21  8:17 PM   Result Value Ref Range    Sodium 131 (L) 136 - 145 mmol/L    Potassium 3.6 3.5 - 5.0 mmol/L    Chloride 99 98 - 107 mmol/L    Carbon Dioxide (CO2) 20 (L) 22 - 31 mmol/L    Anion Gap 12 5 - 18 mmol/L    Urea Nitrogen 6 (L) 8 - 22 mg/dL    Creatinine 0.69 0.60 - 1.10 mg/dL    Calcium 9.2 8.5 - 10.5 mg/dL    Glucose 119 70 - 125 mg/dL    GFR Estimate >90 >60 mL/min/1.73m2   Ammonia    Collection Time: 08/15/21  8:17 PM   Result Value Ref Range    Ammonia 36 (H) 11 - 35 umol/L   Magnesium    Collection Time: 08/15/21  8:17 PM   Result Value Ref Range    Magnesium 1.7 (L) 1.8 - 2.6 mg/dL   Ethyl Alcohol Level    Collection Time: 08/15/21  8:17 PM   Result Value Ref Range    Alcohol, Blood <10 None detected mg/dL   CBC with platelets and differential    Collection Time: 08/15/21  8:17 PM   Result Value Ref Range    WBC Count 2.9 (L) 4.0 - 11.0 10e3/uL    RBC Count 3.48 (L) 3.80 - 5.20 10e6/uL    Hemoglobin 11.7 11.7 - 15.7 g/dL    Hematocrit 33.2 (L) 35.0 - 47.0 %    MCV 95 78 - 100 fL    MCH 33.6 (H) 26.5 - 33.0 pg    MCHC 35.2 31.5 - 36.5 g/dL    RDW 12.8 10.0 - 15.0 %    Platelet Count 66 (L) 150 - 450 10e3/uL    % Neutrophils 63 %    % Lymphocytes 23 %    % Monocytes 13 %    % Eosinophils 1 %    % Basophils 0 %    % Immature Granulocytes 0 %    NRBCs per 100 WBC 0 <1 /100    Absolute Neutrophils 1.8 1.6 - 8.3 10e3/uL    Absolute Lymphocytes 0.7 (L) 0.8 - 5.3 10e3/uL    Absolute Monocytes 0.4 0.0 - 1.3  10e3/uL    Absolute Eosinophils 0.0 0.0 - 0.7 10e3/uL    Absolute Basophils 0.0 0.0 - 0.2 10e3/uL    Absolute Immature Granulocytes 0.0 <=0.0 10e3/uL    Absolute NRBCs 0.0 10e3/uL   Hepatic panel    Collection Time: 08/15/21  8:17 PM   Result Value Ref Range    Bilirubin Total 1.5 (H) 0.0 - 1.0 mg/dL    Bilirubin Direct 0.6 (H) <=0.5 mg/dL    Protein Total 7.0 6.0 - 8.0 g/dL    Albumin 4.0 3.5 - 5.0 g/dL    Alkaline Phosphatase 71 45 - 120 U/L     (H) 0 - 40 U/L     (H) 0 - 45 U/L   Phosphorus    Collection Time: 08/15/21  8:17 PM   Result Value Ref Range    Phosphorus 3.1 2.5 - 4.5 mg/dL   Drugs of Abuse 1+ Panel, Urine ([facility] Only)    Collection Time: 08/15/21  9:58 PM   Result Value Ref Range    Amphetamines Urine Screen Negative Screen Negative    Benzodiazepines Urine Screen Negative Screen Negative    Opiates Urine Screen Negative Screen Negative    PCP Urine Screen Negative Screen Negative    Cannabinoids Urine Screen Negative Screen Negative    Barbiturates Urine Screen Negative Screen Negative    Cocaine Urine Screen Negative Screen Negative    Methadone Urine Screen Negative Screen Negative    Oxycodone Urine Screen Negative Screen Negative    Creatinine Urine mg/dL 60 mg/dL   Asymptomatic COVID-19 Virus (Coronavirus) by PCR Nose    Collection Time: 08/15/21 10:28 PM    Specimen: Nose; Swab   Result Value Ref Range    SARS CoV2 PCR Negative Negative       All laboratory data reviewed  Serum Glucose range:   Recent Labs   Lab 08/15/21  2017 08/10/21  1251    79     ABG: No lab results found in last 7 days.  CBC:   Recent Labs   Lab 08/15/21  2017 08/10/21  1251   WBC 2.9* 4.2   HGB 11.7 12.8   HCT 33.2* 38.2   MCV 95 98   PLT 66* 150   NEUTROPHIL 63 51   LYMPH 23 40   MONOCYTE 13 7   EOSINOPHIL 1 1     Chemistry:   Recent Labs   Lab 08/15/21  2017 08/10/21  1251   * 136   POTASSIUM 3.6 3.7   CHLORIDE 99 103   CO2 20* 18*   BUN 6* 7   CR 0.69 0.55   GFRESTIMATED >90 >90   LANNY 9.2  8.5   MAG 1.7*  --    PROTTOTAL 7.0 7.5   ALBUMIN 4.0 4.0   * 191*   * 173*   ALKPHOS 71 83   BILITOTAL 1.5* 0.8   KATY 36*  --      Coags:  Recent Labs   Lab 08/15/21  2017   INR 1.08     Cardiac Markers:  No results for input(s): CKTOTAL, TROPONINI in the last 168 hours.       XR Chest 2 Views    Result Date: 7/20/2021  EXAM: XR CHEST 2 VW LOCATION: [facility] DATE/TIME: 7/20/2021 8:44 PM INDICATION: breathlessness in pt with pancreatitis, evaluate for inflammation in lower lung fields COMPARISON: Chest CT 09/10/2020, Abdomen CT earlier today     IMPRESSION: Lungs are clear. No effusions. Heart and pulmonary vascularity are normal. No signs of acute disease.    US Abdomen Limited    Result Date: 7/19/2021  EXAM: US ABDOMEN LIMITED LOCATION: [facility] DATE/TIME: 7/19/2021 4:19 AM INDICATION: abd pain, pancreatitis COMPARISON: Abdominal ultrasound 09/10/2020. CT abdomen and pelvis 09/10/2020. TECHNIQUE: Limited abdominal ultrasound. FINDINGS: GALLBLADDER: Small amount of sludge in the gallbladder. No significant wall thickening. No pericholecystic fluid. Negative ultrasound Hackett sign. BILE DUCTS: No biliary dilatation. The common duct measures 4 mm. LIVER: Echogenic parenchyma. No focal mass. RIGHT KIDNEY: No hydronephrosis. PANCREAS: The visualized portions are normal. No ascites.     IMPRESSION: 1.  Sludge in the gallbladder. No specific evidence for cholecystitis. 2.  Hepatic steatosis.     CT Abdomen Pelvis w Contrast    Result Date: 7/20/2021  EXAM: CT ABDOMEN PELVIS W CONTRAST LOCATION: [facility] DATE/TIME: 7/20/2021 8:39 PM INDICATION: Epigastric pain COMPARISON: Ultrasound 7/19/2021. CT 9/10/2020 TECHNIQUE: CT scan of the abdomen and pelvis was performed following injection of IV contrast. Multiplanar reformats were obtained. Dose reduction techniques were used. CONTRAST: isovue 370 100ml FINDINGS: LOWER CHEST: Normal.  HEPATOBILIARY: Prominent diffuse fatty infiltration of liver unchanged. PANCREAS: Normal. Resolution of previous peripancreatic inflammation. SPLEEN: Normal. ADRENAL GLANDS: Normal. KIDNEYS/BLADDER: Normal appearance of kidneys, no stones or hydronephrosis. There does appear to be subtle enhancement of the uroepithelium involving proximal right ureter, no ureteral stone. There is a large air-fluid level within the bladder, presumably from recent catheterization. There does appear to be mild bladder wall thickening. No bladder stones. BOWEL: No obstruction or inflammatory change. Appendix normal. LYMPH NODES: Normal. VASCULATURE: Unremarkable. PELVIC ORGANS: IUD in good position. No adnexal lesions. Small volume of free pelvic fluid likely physiologic. MUSCULOSKELETAL: Normal.     IMPRESSION: 1.  Subtle enhancement of right ureter raises question of ureteral inflammation, no CT evidence for pyelonephritis. 2.  Air-fluid level within the bladder, presumably from recent catheterization. Minimal bladder wall thickening. 3.  Resolution of peripancreatic edema. 4.  Prominent fatty infiltration of liver unchanged.    Head CT w/o contrast    Result Date: 8/15/2021  EXAM: CT HEAD W/O CONTRAST LOCATION: [facility] DATE/TIME: 8/15/2021 8:46 PM INDICATION: Injury. Pain. Seizure. COMPARISON: None. TECHNIQUE: Routine CT Head without IV contrast. Multiplanar reformats. Dose reduction techniques were used. FINDINGS: INTRACRANIAL CONTENTS: No intracranial hemorrhage, extraaxial collection, or mass effect.  No CT evidence of acute infarct. Normal parenchymal attenuation. Mild generalized volume loss. No hydrocephalus. VISUALIZED ORBITS/SINUSES/MASTOIDS: No intraorbital abnormality. No paranasal sinus mucosal disease. No middle ear or mastoid effusion. BONES/SOFT TISSUES: Right parietal and mid right frontal convexity scalp swelling/hematoma noted. No associated fracture or evidence for intracranial injury.      IMPRESSION: 1.  Mild, generalized atrophy. No focal volume loss. 2.  Scalp swelling. No fracture. 3.  Negative for acute intracranial process.    CT Head w/o Contrast    Result Date: 8/10/2021  CT OF THE HEAD WITHOUT CONTRAST 8/10/2021 3:39 PM COMPARISON: None HISTORY: New, atypical headache. Vision changes. Nausea. Weakness. TECHNIQUE: 5 mm thick axial CT images of the head were acquired without IV contrast material. FINDINGS: There is mild diffuse cerebral volume loss. There are subtle patchy areas of decreased density in the cerebral white matter bilaterally that are consistent with sequela of chronic small vessel ischemic disease. The ventricles and basal cisterns are within normal limits in configuration given the degree of cerebral volume loss.  There is no midline shift. There are no extra-axial fluid collections. No intracranial hemorrhage, mass or recent infarct. The visualized paranasal sinuses are well-aerated. There is no mastoiditis. There are no fractures of the visualized bones.     IMPRESSION: Diffuse cerebral volume loss and cerebral white matter changes consistent with chronic small vessel ischemic disease. No evidence for acute intracranial pathology. Radiation dose for this scan was reduced using automated exposure control, adjustment of the mA and/or kV according to patient size, or iterative reconstruction technique DAGMAR LAYTON MD   SYSTEM ID:  [id number]      Latest radiology report personally reviewed.    Note created using dragon voice recognition software so sounds alike errors may have escaped editing.      08/16/2021   Gregory Wayne MD  Hospitalist, [facility]  Pager: [phone]

## 2021-08-16 NOTE — ED NOTES
Pt states has a headache.  Pt unsure if Is from the fall and bump on the head and or not drinking.  Pt alert.  Up to the BR.  Shaky but steady on feet.

## 2021-08-16 NOTE — PROGRESS NOTES
Bedside routine EEG performed using disposable electrodes according to 10/20 placement system. Activations performed: Photic, Sensory, Auditory. HV 3 mins w/good effort.    EEG#   Machine used: [id number]    Skin intact prior to electrode placement.    Chel ROSALES TMaureen, CLTM

## 2021-08-16 NOTE — ED PROVIDER NOTES
EMERGENCY DEPARTMENT ENCOUNTER      NAME: Fabiola Barrera  AGE: 36 year old female  YOB: 1985  MRN: [id number]  EVALUATION DATE & TIME: 8/15/2021  7:56 PM    PCP: Yohana Garcia    ED PROVIDER: Les Garrido M.D.    Chief Complaint   Patient presents with     Seizures       FINAL IMPRESSION:  1. Alcohol withdrawal seizure without complication (H)    2. Hematoma of scalp, initial encounter        ED COURSE & MEDICAL DECISION MAKING:    Pertinent Labs & Imaging studies personally reviewed and interpreted by me. (See chart for details)  8:01 PM Patient seen and examined, prior records reviewed.  Differential diagnosis includes but limited to benzodiazepine withdrawal, alcohol withdrawal, electrolyte disturbance, intracranial hemorrhage, hypoxia.  Patient presents with seizure, missed at dinner.  Fell backward and hit her head, she does have scalp hematoma.  Head CT is ordered.  Additionally, patient notes that she has been sober for the last 2 days.  She is tremulous on initial exam with piloerection and diaphoresis.  Possible alcohol withdrawal seizure.  Labs are ordered.  9:25 PM head CT is negative for acute intracranial pathology.  Remaining labs with mild hyponatremia, mild hypomagnesemia and pancytopenia likely related to alcohol dependence.  We will continue to watch in the emergency department and plan for patient to the hospital.  11:08 PM I rechecked and updated the patient.  She is becoming more tremulous again, additional Valium IV is ordered.  12:19 AM no beds are available in the [facility] area, patient will be boarding in the emergency department pending admission in the morning.  Will discuss with admitting team.  12:28 AM care discussed with Dr. Blake, hospitalist who accepts patient for admission, will board in the emergency department.       At the conclusion of the encounter I discussed the results of all of the tests and the disposition. The questions were answered. The  patient or family acknowledged understanding and was agreeable with the care plan.     PROCEDURES:   Procedures    MEDICATIONS GIVEN IN THE EMERGENCY:  Medications   acetaminophen (TYLENOL) tablet 650 mg (650 mg Oral Given 8/15/21 2102)   diazepam (VALIUM) injection 2.5 mg (2.5 mg Intravenous Given 8/15/21 2102)   ketorolac (TORADOL) injection 15 mg (15 mg Intravenous Given 8/15/21 2247)       NEW PRESCRIPTIONS STARTED AT TODAY'S ER VISIT  New Prescriptions    No medications on file       =================================================================    HPI    Patient information was obtained from: Patient      Fabiola Barrera is a 36 year old female with a pertinent history of alcohol abuse, depression, and migraines who presents to this ED via EMS for evaluation of a seizure.    Patient reports she was at the dinner table today when she felt shaky and weak then had a seizure and fell out of her chair. She endorses hitting her head. She denies a history of seizures. She denies the possibility of pregnancy. Patient reports that she recently stopped drinking. Patient denies cough, cold, runny nose, vomiting, diarrhea, or any other current complaints.      REVIEW OF SYSTEMS   Review of Systems   HENT: Negative for rhinorrhea.    Respiratory: Negative for cough.    Gastrointestinal: Negative for diarrhea and vomiting.   Neurological: Positive for tremors, seizures, weakness and headaches.      All other systems reviewed and negative    PAST MEDICAL HISTORY:  Past Medical History:   Diagnosis Date     Depression      Depression      Migraine      Migraine      Pancreatitis      Pancreatitis        PAST SURGICAL HISTORY:  Past Surgical History:   Procedure Laterality Date      SECTION      x2       CURRENT MEDICATIONS:    No current facility-administered medications for this encounter.     Current Outpatient Medications   Medication     alum & mag hydroxide-simethicone (MAALOX) 200-200-20 MG/5ML SUSP suspension      calcium carbonate (TUMS) 500 MG chewable tablet     famotidine (PEPCID) 40 MG tablet     folic acid (FOLVITE) 1 MG tablet     hydrOXYzine (ATARAX) 50 MG tablet     melatonin 5 MG tablet     multivitamin w/minerals (THERA-VIT-M) tablet     polyethylene glycol (MIRALAX) 17 GM/Dose powder     rizatriptan (MAXALT-MLT) 10 MG ODT     traZODone (DESYREL) 50 MG tablet       ALLERGIES:  Allergies   Allergen Reactions     Phenazopyridine Anaphylaxis     Oxycodone-Acetaminophen Rash     Tolerates hydrocodone-APAP       FAMILY HISTORY:  Family History   Problem Relation Age of Onset     Nephrolithiasis Maternal Grandfather        SOCIAL HISTORY:   Social History     Socioeconomic History     Marital status:      Spouse name: None     Number of children: None     Years of education: None     Highest education level: None   Occupational History     None   Tobacco Use     Smoking status: Current Some Day Smoker     Smokeless tobacco: Never Used     Tobacco comment: 1 cigarette every other week   Substance and Sexual Activity     Alcohol use: Yes     Alcohol/week: 0.0 - 10.0 standard drinks     Comment: Alcoholic Drinks/day: varies from none to several drinks a day, drinks bi weekly     Drug use: Never     Sexual activity: None   Other Topics Concern     None   Social History Narrative     None     Social Determinants of Health     Financial Resource Strain:      Difficulty of Paying Living Expenses:    Food Insecurity:      Worried About Running Out of Food in the Last Year:      Ran Out of Food in the Last Year:    Transportation Needs:      Lack of Transportation (Medical):      Lack of Transportation (Non-Medical):    Physical Activity:      Days of Exercise per Week:      Minutes of Exercise per Session:    Stress:      Feeling of Stress :    Social Connections:      Frequency of Communication with Friends and Family:      Frequency of Social Gatherings with Friends and Family:      Attends Sabianist Services:       "Active Member of Clubs or Organizations:      Attends Club or Organization Meetings:      Marital Status:    Intimate Partner Violence:      Fear of Current or Ex-Partner:      Emotionally Abused:      Physically Abused:      Sexually Abused:        VITALS:  BP (!) 184/117   Pulse 69   Temp 98.5  F (36.9  C) (Oral)   Resp 20   Ht 1.6 m (5' 3\")   Wt 52.2 kg (115 lb)   LMP  (LMP Unknown)   SpO2 100%   BMI 20.37 kg/m      PHYSICAL EXAM:  Physical Exam  Vitals and nursing note reviewed.   Constitutional:       Appearance: Normal appearance.   HENT:      Head: Normocephalic.      Comments: Moderate hematoma to occipital parietal region.     Right Ear: External ear normal.      Left Ear: External ear normal.      Nose: Nose normal.      Mouth/Throat:      Mouth: Mucous membranes are moist.   Eyes:      Extraocular Movements: Extraocular movements intact.      Conjunctiva/sclera: Conjunctivae normal.      Pupils: Pupils are equal, round, and reactive to light.   Cardiovascular:      Rate and Rhythm: Normal rate and regular rhythm.   Pulmonary:      Effort: Pulmonary effort is normal.      Breath sounds: Normal breath sounds. No wheezing or rales.   Abdominal:      General: Abdomen is flat. There is no distension.      Palpations: Abdomen is soft.      Tenderness: There is no abdominal tenderness. There is no guarding.   Musculoskeletal:         General: Normal range of motion.      Cervical back: Normal range of motion and neck supple.      Right lower leg: No edema.      Left lower leg: No edema.   Lymphadenopathy:      Cervical: No cervical adenopathy.   Skin:     General: Skin is warm and dry.      Comments: Mild piloerection.   Neurological:      General: No focal deficit present.      Mental Status: She is alert and oriented to person, place, and time. Mental status is at baseline.      Motor: Tremor present.      Comments: No gross focal neurologic deficits   Psychiatric:         Mood and Affect: Mood normal. "         Behavior: Behavior normal.         Thought Content: Thought content normal.           LAB:  All pertinent labs reviewed and interpreted.  Results for orders placed or performed during the hospital encounter of 08/15/21   Head CT w/o contrast    Impression    IMPRESSION:  1.  Mild, generalized atrophy. No focal volume loss.    2.  Scalp swelling. No fracture.    3.  Negative for acute intracranial process.   Result Value Ref Range    INR 1.08 0.85 - 1.15   Basic metabolic panel   Result Value Ref Range    Sodium 131 (L) 136 - 145 mmol/L    Potassium 3.6 3.5 - 5.0 mmol/L    Chloride 99 98 - 107 mmol/L    Carbon Dioxide (CO2) 20 (L) 22 - 31 mmol/L    Anion Gap 12 5 - 18 mmol/L    Urea Nitrogen 6 (L) 8 - 22 mg/dL    Creatinine 0.69 0.60 - 1.10 mg/dL    Calcium 9.2 8.5 - 10.5 mg/dL    Glucose 119 70 - 125 mg/dL    GFR Estimate >90 >60 mL/min/1.73m2   Result Value Ref Range    Ammonia 36 (H) 11 - 35 umol/L   Result Value Ref Range    Magnesium 1.7 (L) 1.8 - 2.6 mg/dL   Ethyl Alcohol Level   Result Value Ref Range    Alcohol, Blood <10 None detected mg/dL   CBC with platelets and differential   Result Value Ref Range    WBC Count 2.9 (L) 4.0 - 11.0 10e3/uL    RBC Count 3.48 (L) 3.80 - 5.20 10e6/uL    Hemoglobin 11.7 11.7 - 15.7 g/dL    Hematocrit 33.2 (L) 35.0 - 47.0 %    MCV 95 78 - 100 fL    MCH 33.6 (H) 26.5 - 33.0 pg    MCHC 35.2 31.5 - 36.5 g/dL    RDW 12.8 10.0 - 15.0 %    Platelet Count 66 (L) 150 - 450 10e3/uL    % Neutrophils 63 %    % Lymphocytes 23 %    % Monocytes 13 %    % Eosinophils 1 %    % Basophils 0 %    % Immature Granulocytes 0 %    NRBCs per 100 WBC 0 <1 /100    Absolute Neutrophils 1.8 1.6 - 8.3 10e3/uL    Absolute Lymphocytes 0.7 (L) 0.8 - 5.3 10e3/uL    Absolute Monocytes 0.4 0.0 - 1.3 10e3/uL    Absolute Eosinophils 0.0 0.0 - 0.7 10e3/uL    Absolute Basophils 0.0 0.0 - 0.2 10e3/uL    Absolute Immature Granulocytes 0.0 <=0.0 10e3/uL    Absolute NRBCs 0.0 10e3/uL   Drugs of Abuse 1+ Panel,  Urine ([facility] Only)   Result Value Ref Range    Amphetamines Urine Screen Negative Screen Negative    Benzodiazepines Urine Screen Negative Screen Negative    Opiates Urine Screen Negative Screen Negative    PCP Urine Screen Negative Screen Negative    Cannabinoids Urine Screen Negative Screen Negative    Barbiturates Urine Screen Negative Screen Negative    Cocaine Urine Screen Negative Screen Negative    Methadone Urine Screen Negative Screen Negative    Oxycodone Urine Screen Negative Screen Negative    Creatinine Urine mg/dL 60 mg/dL       RADIOLOGY:  Reviewed all pertinent imaging. Please see official radiology report.  Head CT w/o contrast   Final Result   IMPRESSION:   1.  Mild, generalized atrophy. No focal volume loss.      2.  Scalp swelling. No fracture.      3.  Negative for acute intracranial process.      DAYANARA JANG MD            SYSTEM ID:  [id number]          EKG:    Performed at: 8:00 PM  Impression: Normal EKG  Rate: 72  Rhythm: Sinus  Axis: Normal  AK Interval: 120  QRS Interval: 74  QTc Interval: 431  ST Changes: No acute ischemic changes  Comparison: Prior of August 10, no acute changes are found    I have independently reviewed and interpreted the EKG(s) documented above.    I, Rehana Juárez, am serving as a scribe to document services personally performed by Dr. Garrido based on my observation and the provider's statements to me. I, Les Garrido MD attest that Rehana Juárez is acting in a scribe capacity, has observed my performance of the services and has documented them in accordance with my direction.    Les Garrido M.D.  Emergency Medicine  [facility] EMERGENCY DEPARTMENT  [facility] [facility] [id number] [phone]  Dept: [phone]     Les Garrido MD  08/16/21 0028

## 2021-08-16 NOTE — ED NOTES
Bed: JNED-14  Expected date: 8/15/21  Expected time: 7:50 PM  Means of arrival: Ambulance  Comments:  [facility]- withdrawal seizure.

## 2021-08-16 NOTE — PLAN OF CARE
Problem: Adult Inpatient Plan of Care  Goal: Plan of Care Review  Outcome: Improving  Flowsheets (Taken 8/16/2021 1703)  Plan of Care Reviewed With: patient  Progress: improving  Pt scoring 19, 11 and 6 on CIWA protocol and given ativan per protocol.  Seemed to become less anxious and scored lower with each successive medication given for pain and withdrawal.  Will continue to monitor and medicate prn.

## 2021-08-16 NOTE — H&P
[facility]    History and Physical - Hospitalist Service       Date of Admission:  8/15/2021    Assessment & Plan      Fabiola Barrera is a 36 year old female admitted on 8/15/2021. She has history of alcohol pancreatitis, alcohol hepatitis, hepatic steatosis, anxiety and migraines.  She takes hydroxyzine and Maxalt as needed.  She has been drinking 2 glasses of wine most days over the last few weeks and then drank 1 bottle of wine daily on August 12 and 13.  She has not drank alcohol since then and felt shaky before she fell backwards and had a seizure.    1.  Alcohol withdrawal seizure  No previous history of seizure  CT head showed mild generalized atrophy, no focal volume loss  Check EEG  Neurochecks, seizure precautions  Neurology consult    2.  Alcohol abuse, withdrawals  CIWA protocol, as needed lorazepam  Clonidine, gabapentin  Multivitamin, thiamine, folic acid    3.  Hepatic steatosis  Recommend alcohol cessation  Will benefit from chemical dependency treatment    4.  Cytopenias  WBC 2.9, platelets 66  Likely bone marrow suppression in the setting of alcohol abuse    5.  Hyponatremia  Secondary to volume depletion  IV hydration    6.  Hypomagnesemia  Secondary to alcohol abuse  Oral magnesium     Diet: Combination Diet Regular Diet Adult    DVT Prophylaxis: Pneumatic Compression Devices  Grullon Catheter: Not present  Central Lines: None  Code Status:  Full code    Clinically Significant Risk Factors Present on Admission         # Hyponatremia: Na = 131 mmol/L (Ref range: 136 - 145 mmol/L) on admission, will monitor as appropriate      # Thrombocytopenia: Plts = 66 10e3/uL (Ref range: 150 - 450 10e3/uL) on admission, will monitor for bleeding      Disposition Plan   Expected discharge: 2 days recommended to prior living arrangement once Alcohol withdrawals treated.     The patient's care was discussed with the Patient.    Florentino Blake MD  [facility]  Hospital  Securely message with the [email or web address] Web Console (learn more here)  Text page via [facility] Paging/Directory      ______________________________________________________________________    Chief Complaint   Seizure    History is obtained from the patient, electronic health record and emergency department physician    History of Present Illness   Fabiola Barrera is a 36 year old female who was brought to the emergency department for evaluation of seizure.  Patient has been drinking more over the last few weeks and last alcohol intake was 2 days prior to arrival.  She felt shaky and fell backwards hitting the back her head.  She had a seizure witnessed by family, bit her tongue and lost urine continence.  She complains of headaches, denies vision changes, numbness or tingling.  She denies fevers, chills, chest pain, shortness of breath, palpitations.  She has had decreased oral intake but denies nausea, vomiting or diarrhea.  She was admitted to this hospital last month secondary to alcohol intoxication with pancreatitis, gastritis and hepatitis.    Review of Systems    The 10 point Review of Systems is negative other than noted in the HPI or here.     Past Medical History    I have reviewed this patient's medical history and updated it with pertinent information if needed.   Past Medical History:   Diagnosis Date     Depression      Depression      Migraine      Migraine      Pancreatitis      Pancreatitis        Past Surgical History   I have reviewed this patient's surgical history and updated it with pertinent information if needed.  Past Surgical History:   Procedure Laterality Date      SECTION      x2       Social History   I have reviewed this patient's social history and updated it with pertinent information if needed.  Social History     Tobacco Use     Smoking status: Current Some Day Smoker     Smokeless tobacco: Never Used     Tobacco comment: 1 cigarette every other week   Substance Use Topics      Alcohol use: Yes     Alcohol/week: 0.0 - 10.0 standard drinks     Comment: Alcoholic Drinks/day: varies from none to several drinks a day, drinks bi weekly     Drug use: Never       Family History   I have reviewed this patient's family history and updated it with pertinent information if needed.  Family History   Problem Relation Age of Onset     Nephrolithiasis Maternal Grandfather        Prior to Admission Medications   Prior to Admission Medications   Prescriptions Last Dose Informant Patient Reported? Taking?   alum & mag hydroxide-simethicone (MAALOX) 200-200-20 MG/5ML SUSP suspension   No Yes   Sig: Take 30 mLs by mouth every 4 hours as needed for indigestion   calcium carbonate (TUMS) 500 MG chewable tablet   No Yes   Sig: Take 1 tablet (500 mg) by mouth daily as needed for heartburn   famotidine (PEPCID) 40 MG tablet   No Yes   Sig: Take 1 tablet (40 mg) by mouth daily   Patient taking differently: Take 40 mg by mouth daily as needed for heartburn    folic acid (FOLVITE) 1 MG tablet when remembers  No Yes   Sig: Take 1 tablet (1 mg) by mouth daily   hydrOXYzine (ATARAX) 50 MG tablet   No Yes   Sig: Take 1 tablet (50 mg) by mouth every 6 hours as needed for anxiety   melatonin 5 MG tablet 8/14/2021 at Unknown time  No Yes   Sig: Take 1 tablet (5 mg) by mouth every evening as needed for sleep   Patient taking differently: Take 5 mg by mouth At Bedtime    multivitamin w/minerals (THERA-VIT-M) tablet when remembers  No Yes   Sig: Take 1 tablet by mouth daily   polyethylene glycol (MIRALAX) 17 GM/Dose powder   No Yes   Sig: Take 17 g by mouth daily   Patient taking differently: Take 17 g by mouth daily as needed for constipation    rizatriptan (MAXALT-MLT) 10 MG ODT   Yes Yes   Sig: Take 10 mg by mouth every 2 hours as needed for migraine   traZODone (DESYREL) 50 MG tablet   No Yes   Sig: Take 1 tablet (50 mg) by mouth nightly as needed for sleep      Facility-Administered Medications: None     Allergies    Allergies   Allergen Reactions     Phenazopyridine Anaphylaxis     Oxycodone-Acetaminophen Rash     Tolerates hydrocodone-APAP       Physical Exam   Vital Signs: Temp: 98.5  F (36.9  C) Temp src: Oral BP: (!) 137/97 Pulse: 79   Resp: 22 SpO2: 100 %      Weight: 115 lbs 0 oz    General: Appears acutely ill  HEENT: Occipital scalp contusion  Neck: No JVD  Heart: Regular rate and rhythm  Lungs: Clear to auscultation  Abdomen: Bowel sounds present, nondistended  Extremities: Nontender, no edema  Skin: Warm, dry  Neuro: Fine tremors, awake, alert, oriented  Psychiatric: Depressed mood    Data   Data reviewed today: I reviewed all medications, new labs and imaging results over the last 24 hours. I personally reviewed the EKG tracing showing Sinus rhythm at 72 bpm, nonspecific T waves.    Recent Labs   Lab 08/15/21  2017 08/10/21  1251   WBC 2.9* 4.2   HGB 11.7 12.8   MCV 95 98   PLT 66* 150   INR 1.08  --    * 136   POTASSIUM 3.6 3.7   CHLORIDE 99 103   CO2 20* 18*   BUN 6* 7   CR 0.69 0.55   ANIONGAP 12 15*   LANNY 9.2 8.5    79   ALBUMIN  --  4.0   PROTTOTAL  --  7.5   BILITOTAL  --  0.8   ALKPHOS  --  83   ALT  --  173*   AST  --  191*   LIPASE  --  43*     Recent Results (from the past 24 hour(s))   Head CT w/o contrast    Narrative    EXAM: CT HEAD W/O CONTRAST  LOCATION: [facility]  DATE/TIME: 8/15/2021 8:46 PM    INDICATION: Injury. Pain. Seizure.  COMPARISON: None.  TECHNIQUE: Routine CT Head without IV contrast. Multiplanar reformats. Dose reduction techniques were used.    FINDINGS:  INTRACRANIAL CONTENTS: No intracranial hemorrhage, extraaxial collection, or mass effect.  No CT evidence of acute infarct. Normal parenchymal attenuation. Mild generalized volume loss. No hydrocephalus.     VISUALIZED ORBITS/SINUSES/MASTOIDS: No intraorbital abnormality. No paranasal sinus mucosal disease. No middle ear or mastoid effusion.    BONES/SOFT TISSUES: Right parietal and mid right  frontal convexity scalp swelling/hematoma noted. No associated fracture or evidence for intracranial injury.      Impression    IMPRESSION:  1.  Mild, generalized atrophy. No focal volume loss.    2.  Scalp swelling. No fracture.    3.  Negative for acute intracranial process.

## 2021-08-16 NOTE — PHARMACY-ADMISSION MEDICATION HISTORY
Pharmacy Note - Admission Medication History     ______________________________________________________________________    Prior To Admission (PTA) med list completed and updated in EMR.       PTA Med List   Medication Sig Last Dose     alum & mag hydroxide-simethicone (MAALOX) 200-200-20 MG/5ML SUSP suspension Take 30 mLs by mouth every 4 hours as needed for indigestion      calcium carbonate (TUMS) 500 MG chewable tablet Take 1 tablet (500 mg) by mouth daily as needed for heartburn      famotidine (PEPCID) 40 MG tablet Take 1 tablet (40 mg) by mouth daily (Patient taking differently: Take 40 mg by mouth daily as needed for heartburn )      folic acid (FOLVITE) 1 MG tablet Take 1 tablet (1 mg) by mouth daily when remembers     hydrOXYzine (ATARAX) 50 MG tablet Take 1 tablet (50 mg) by mouth every 6 hours as needed for anxiety      melatonin 5 MG tablet Take 1 tablet (5 mg) by mouth every evening as needed for sleep (Patient taking differently: Take 5 mg by mouth At Bedtime ) 8/14/2021 at Unknown time     multivitamin w/minerals (THERA-VIT-M) tablet Take 1 tablet by mouth daily when remembers     polyethylene glycol (MIRALAX) 17 GM/Dose powder Take 17 g by mouth daily (Patient taking differently: Take 17 g by mouth daily as needed for constipation )      rizatriptan (MAXALT-MLT) 10 MG ODT Take 10 mg by mouth every 2 hours as needed for migraine      traZODone (DESYREL) 50 MG tablet Take 1 tablet (50 mg) by mouth nightly as needed for sleep        Information source(s): Patient and Hospital records  Method of interview communication: in-person    Summary of Changes to PTA Med List  Discontinued: thiamine, Xanax, amlodipine, Dilaudid, Levaquin, Zofran, senna-docusate, sucralfate  Changed: Miralax    Patient was asked about OTC/herbal products specifically.  PTA med list reflects this.    Allergies were reviewed, assessed, and updated with the patient.      Patient does not use any multi-dose medications prior to  admission.    The information provided in this note is only as accurate as the sources available at the time of the update(s).    Thank you for the opportunity to participate in the care of this patient.    Lainey Ortiz [facility]  8/15/2021 9:35 PM

## 2021-08-17 PROBLEM — K85.90 ACUTE PANCREATITIS, UNSPECIFIED COMPLICATION STATUS, UNSPECIFIED PANCREATITIS TYPE: Status: RESOLVED | Noted: 2021-07-18 | Resolved: 2021-08-17

## 2021-08-17 PROBLEM — F10.20 ALCOHOL USE DISORDER, SEVERE, DEPENDENCE (H): Status: ACTIVE | Noted: 2021-08-17

## 2021-08-17 LAB
ALBUMIN SERPL-MCNC: 3.6 G/DL (ref 3.5–5)
ALBUMIN UR-MCNC: NEGATIVE MG/DL
ALP SERPL-CCNC: 68 U/L (ref 45–120)
ALT SERPL W P-5'-P-CCNC: 147 U/L (ref 0–45)
ANION GAP SERPL CALCULATED.3IONS-SCNC: 8 MMOL/L (ref 5–18)
APPEARANCE UR: CLEAR
AST SERPL W P-5'-P-CCNC: 179 U/L (ref 0–40)
BASOPHILS # BLD MANUAL: 0 10E3/UL (ref 0–0.2)
BASOPHILS NFR BLD MANUAL: 0 %
BILIRUB SERPL-MCNC: 1.2 MG/DL (ref 0–1)
BILIRUB UR QL STRIP: NEGATIVE
BUN SERPL-MCNC: 9 MG/DL (ref 8–22)
CALCIUM SERPL-MCNC: 9 MG/DL (ref 8.5–10.5)
CHLORIDE BLD-SCNC: 108 MMOL/L (ref 98–107)
CO2 SERPL-SCNC: 21 MMOL/L (ref 22–31)
COLOR UR AUTO: ABNORMAL
CREAT SERPL-MCNC: 0.6 MG/DL (ref 0.6–1.1)
EOSINOPHIL # BLD MANUAL: 0.2 10E3/UL (ref 0–0.7)
EOSINOPHIL NFR BLD MANUAL: 6 %
ERYTHROCYTE [DISTWIDTH] IN BLOOD BY AUTOMATED COUNT: 12.9 % (ref 10–15)
GFR SERPL CREATININE-BSD FRML MDRD: >90 ML/MIN/1.73M2
GLUCOSE BLD-MCNC: 88 MG/DL (ref 70–125)
GLUCOSE UR STRIP-MCNC: NEGATIVE MG/DL
HCT VFR BLD AUTO: 32.7 % (ref 35–47)
HGB BLD-MCNC: 11 G/DL (ref 11.7–15.7)
HGB UR QL STRIP: ABNORMAL
HYALINE CASTS: 1 /LPF
KETONES UR STRIP-MCNC: NEGATIVE MG/DL
LEUKOCYTE ESTERASE UR QL STRIP: ABNORMAL
LYMPHOCYTES # BLD MANUAL: 1 10E3/UL (ref 0.8–5.3)
LYMPHOCYTES NFR BLD MANUAL: 31 %
MAGNESIUM SERPL-MCNC: 1.8 MG/DL (ref 1.8–2.6)
MCH RBC QN AUTO: 33.7 PG (ref 26.5–33)
MCHC RBC AUTO-ENTMCNC: 33.6 G/DL (ref 31.5–36.5)
MCV RBC AUTO: 100 FL (ref 78–100)
MONOCYTES # BLD MANUAL: 0.1 10E3/UL (ref 0–1.3)
MONOCYTES NFR BLD MANUAL: 3 %
NEUTROPHILS # BLD MANUAL: 2 10E3/UL (ref 1.6–8.3)
NEUTROPHILS NFR BLD MANUAL: 60 %
NITRATE UR QL: NEGATIVE
PH UR STRIP: 6 [PH] (ref 5–7)
PHOSPHATE SERPL-MCNC: 4 MG/DL (ref 2.5–4.5)
PLAT MORPH BLD: NORMAL
PLATELET # BLD AUTO: 57 10E3/UL (ref 150–450)
POTASSIUM BLD-SCNC: 4.2 MMOL/L (ref 3.5–5)
PROT SERPL-MCNC: 6.4 G/DL (ref 6–8)
RBC # BLD AUTO: 3.26 10E6/UL (ref 3.8–5.2)
RBC MORPH BLD: NORMAL
RBC URINE: 1 /HPF
SODIUM SERPL-SCNC: 137 MMOL/L (ref 136–145)
SP GR UR STRIP: 1.01 (ref 1–1.03)
SQUAMOUS EPITHELIAL: <1 /HPF
UROBILINOGEN UR STRIP-MCNC: <2 MG/DL
WBC # BLD AUTO: 3.3 10E3/UL (ref 4–11)
WBC URINE: 39 /HPF

## 2021-08-17 PROCEDURE — 250N000011 HC RX IP 250 OP 636: Performed by: INTERNAL MEDICINE

## 2021-08-17 PROCEDURE — 81001 URINALYSIS AUTO W/SCOPE: CPT | Performed by: INTERNAL MEDICINE

## 2021-08-17 PROCEDURE — 99232 SBSQ HOSP IP/OBS MODERATE 35: CPT | Performed by: PSYCHIATRY & NEUROLOGY

## 2021-08-17 PROCEDURE — 36415 COLL VENOUS BLD VENIPUNCTURE: CPT | Performed by: INTERNAL MEDICINE

## 2021-08-17 PROCEDURE — 85027 COMPLETE CBC AUTOMATED: CPT | Performed by: INTERNAL MEDICINE

## 2021-08-17 PROCEDURE — 250N000013 HC RX MED GY IP 250 OP 250 PS 637: Performed by: HOSPITALIST

## 2021-08-17 PROCEDURE — 250N000013 HC RX MED GY IP 250 OP 250 PS 637: Performed by: INTERNAL MEDICINE

## 2021-08-17 PROCEDURE — 84100 ASSAY OF PHOSPHORUS: CPT | Performed by: INTERNAL MEDICINE

## 2021-08-17 PROCEDURE — 87086 URINE CULTURE/COLONY COUNT: CPT | Performed by: INTERNAL MEDICINE

## 2021-08-17 PROCEDURE — 120N000001 HC R&B MED SURG/OB

## 2021-08-17 PROCEDURE — 999N000127 HC STATISTIC PERIPHERAL IV START W US GUIDANCE

## 2021-08-17 PROCEDURE — 250N000013 HC RX MED GY IP 250 OP 250 PS 637: Performed by: FAMILY MEDICINE

## 2021-08-17 PROCEDURE — 99233 SBSQ HOSP IP/OBS HIGH 50: CPT | Performed by: INTERNAL MEDICINE

## 2021-08-17 PROCEDURE — 83735 ASSAY OF MAGNESIUM: CPT | Performed by: INTERNAL MEDICINE

## 2021-08-17 PROCEDURE — 250N000011 HC RX IP 250 OP 636: Performed by: HOSPITALIST

## 2021-08-17 PROCEDURE — 99255 IP/OBS CONSLTJ NEW/EST HI 80: CPT | Mod: 95 | Performed by: FAMILY MEDICINE

## 2021-08-17 PROCEDURE — 80053 COMPREHEN METABOLIC PANEL: CPT | Performed by: INTERNAL MEDICINE

## 2021-08-17 RX ORDER — LORAZEPAM 2 MG/ML
1 INJECTION INTRAMUSCULAR EVERY 30 MIN PRN
Status: DISCONTINUED | OUTPATIENT
Start: 2021-08-17 | End: 2021-08-19 | Stop reason: HOSPADM

## 2021-08-17 RX ORDER — FOLIC ACID 1 MG/1
1 TABLET ORAL DAILY
Status: DISCONTINUED | OUTPATIENT
Start: 2021-08-17 | End: 2021-08-19 | Stop reason: HOSPADM

## 2021-08-17 RX ORDER — DOCUSATE SODIUM 100 MG/1
200 CAPSULE, LIQUID FILLED ORAL 2 TIMES DAILY
Status: DISCONTINUED | OUTPATIENT
Start: 2021-08-18 | End: 2021-08-19 | Stop reason: HOSPADM

## 2021-08-17 RX ORDER — LORAZEPAM 0.5 MG/1
0.5 TABLET ORAL ONCE
Status: COMPLETED | OUTPATIENT
Start: 2021-08-18 | End: 2021-08-18

## 2021-08-17 RX ORDER — GABAPENTIN 300 MG/1
900 CAPSULE ORAL EVERY 8 HOURS
Status: DISCONTINUED | OUTPATIENT
Start: 2021-08-17 | End: 2021-08-19 | Stop reason: HOSPADM

## 2021-08-17 RX ORDER — CEFTRIAXONE 1 G/1
1 INJECTION, POWDER, FOR SOLUTION INTRAMUSCULAR; INTRAVENOUS EVERY 24 HOURS
Status: DISCONTINUED | OUTPATIENT
Start: 2021-08-17 | End: 2021-08-19 | Stop reason: HOSPADM

## 2021-08-17 RX ORDER — BISACODYL 10 MG
10 SUPPOSITORY, RECTAL RECTAL DAILY PRN
Status: DISCONTINUED | OUTPATIENT
Start: 2021-08-17 | End: 2021-08-19 | Stop reason: HOSPADM

## 2021-08-17 RX ORDER — LORAZEPAM 1 MG/1
1-2 TABLET ORAL EVERY 30 MIN PRN
Status: DISCONTINUED | OUTPATIENT
Start: 2021-08-17 | End: 2021-08-19 | Stop reason: HOSPADM

## 2021-08-17 RX ORDER — LANOLIN ALCOHOL/MO/W.PET/CERES
100 CREAM (GRAM) TOPICAL DAILY
Status: DISCONTINUED | OUTPATIENT
Start: 2021-08-17 | End: 2021-08-19 | Stop reason: HOSPADM

## 2021-08-17 RX ADMIN — HYDROMORPHONE HYDROCHLORIDE 0.2 MG: 0.2 INJECTION, SOLUTION INTRAMUSCULAR; INTRAVENOUS; SUBCUTANEOUS at 17:57

## 2021-08-17 RX ADMIN — LORAZEPAM 1 MG: 1 TABLET ORAL at 08:12

## 2021-08-17 RX ADMIN — BISACODYL SUPPOSITORY 10 MG: 10 SUPPOSITORY RECTAL at 21:43

## 2021-08-17 RX ADMIN — GABAPENTIN 900 MG: 300 CAPSULE ORAL at 01:44

## 2021-08-17 RX ADMIN — CLONIDINE HYDROCHLORIDE 0.1 MG: 0.1 TABLET ORAL at 09:58

## 2021-08-17 RX ADMIN — GABAPENTIN 900 MG: 300 CAPSULE ORAL at 09:58

## 2021-08-17 RX ADMIN — ONDANSETRON 4 MG: 2 INJECTION INTRAMUSCULAR; INTRAVENOUS at 07:39

## 2021-08-17 RX ADMIN — CEFTRIAXONE SODIUM 1 G: 1 INJECTION, POWDER, FOR SOLUTION INTRAMUSCULAR; INTRAVENOUS at 17:04

## 2021-08-17 RX ADMIN — MAGNESIUM OXIDE TAB 400 MG (241.3 MG ELEMENTAL MG) 400 MG: 400 (241.3 MG) TAB at 09:58

## 2021-08-17 RX ADMIN — HYDROMORPHONE HYDROCHLORIDE 0.2 MG: 0.2 INJECTION, SOLUTION INTRAMUSCULAR; INTRAVENOUS; SUBCUTANEOUS at 15:49

## 2021-08-17 RX ADMIN — Medication 100 MG: at 13:24

## 2021-08-17 RX ADMIN — GABAPENTIN 900 MG: 300 CAPSULE ORAL at 17:04

## 2021-08-17 RX ADMIN — HYDROMORPHONE HYDROCHLORIDE 0.2 MG: 0.2 INJECTION, SOLUTION INTRAMUSCULAR; INTRAVENOUS; SUBCUTANEOUS at 13:30

## 2021-08-17 RX ADMIN — FOLIC ACID 1 MG: 1 TABLET ORAL at 13:24

## 2021-08-17 RX ADMIN — LORAZEPAM 1 MG: 1 TABLET ORAL at 11:42

## 2021-08-17 RX ADMIN — LORAZEPAM 1 MG: 2 INJECTION INTRAMUSCULAR; INTRAVENOUS at 18:32

## 2021-08-17 RX ADMIN — FAMOTIDINE 10 MG: 10 TABLET ORAL at 09:58

## 2021-08-17 RX ADMIN — HYDROMORPHONE HYDROCHLORIDE 0.2 MG: 0.2 INJECTION, SOLUTION INTRAMUSCULAR; INTRAVENOUS; SUBCUTANEOUS at 19:57

## 2021-08-17 RX ADMIN — FAMOTIDINE 10 MG: 10 TABLET ORAL at 19:57

## 2021-08-17 RX ADMIN — IBUPROFEN 600 MG: 600 TABLET ORAL at 19:57

## 2021-08-17 RX ADMIN — LORAZEPAM 1 MG: 2 INJECTION INTRAMUSCULAR; INTRAVENOUS at 17:03

## 2021-08-17 RX ADMIN — CLONIDINE HYDROCHLORIDE 0.1 MG: 0.1 TABLET ORAL at 01:44

## 2021-08-17 RX ADMIN — CLONIDINE HYDROCHLORIDE 0.1 MG: 0.1 TABLET ORAL at 17:04

## 2021-08-17 RX ADMIN — HYDROMORPHONE HYDROCHLORIDE 0.2 MG: 0.2 INJECTION, SOLUTION INTRAMUSCULAR; INTRAVENOUS; SUBCUTANEOUS at 09:15

## 2021-08-17 RX ADMIN — HYDROMORPHONE HYDROCHLORIDE 0.2 MG: 0.2 INJECTION, SOLUTION INTRAMUSCULAR; INTRAVENOUS; SUBCUTANEOUS at 22:20

## 2021-08-17 RX ADMIN — HYDROMORPHONE HYDROCHLORIDE 0.2 MG: 0.2 INJECTION, SOLUTION INTRAMUSCULAR; INTRAVENOUS; SUBCUTANEOUS at 01:51

## 2021-08-17 ASSESSMENT — ACTIVITIES OF DAILY LIVING (ADL): DEPENDENT_IADLS:: INDEPENDENT

## 2021-08-17 NOTE — PROGRESS NOTES
NEUROLOGY INPATIENT PROGRESS NOTE       [facility]  [address] [place] [address]  Tel: [phone]  Fax: [phone]  [email or web address]     ASSESSMENT & PLAN   Hospital Day#: 2  Visit diagnosis: Alcohol withdrawal seizure    Alcohol withdrawal seizure  36-year-old female with history of anxiety, migraine headache, alcoholic hepatitis admitted with alcohol withdrawal seizure    CT of the head and MRI scan does not show any intracranial abnormality    EEG was normal    [facility] protocol    High-dose thiamine    Consider chemical dependency consult    Nothing more to add from neurology standpoint I will sign off    Neurology Discharge Planning :   Discharge when okay with hospitalist    Kris Kenney MD  [facility]  (Former[facility], [facility]     PROBLEM LIST      Patient Active Problem List   Diagnosis Code     Skin: A Rash R21     Lymphadenopathy R59.9     Fatigue R53.81, R53.83     Generalized Anxiety Disorder F41.1     Urinary Tract Infection N39.0     Pain During Urination (Dysuria) R30.0     Acute Pharyngitis J02.9     Alcohol-induced acute pancreatitis, unspecified complication status K85.20     Transaminitis R74.01     History of migraine Z86.69     Alcohol abuse F10.10     Depression, unspecified depression type F32.9     Alcoholic hepatitis without ascites K70.10     Alcoholic intoxication with complication (H) F10.929     Acute pancreatitis, unspecified complication status, unspecified pancreatitis type K85.90     Hematoma of scalp, initial encounter S00.03XA     Alcohol withdrawal seizure without complication (H) F10.230, R56.9     Thrombocytopenia (H) D69.6       Past Medical History:   Patient  has a past medical history of Depression, Depression, Migraine, Migraine, Pancreatitis, and Pancreatitis.     SUBJECTIVE     No further seizures reported.  CT of the head, MRI scan unremarkable.  Patient not sure  if she wants inpatient chemical dependency treatment and wants to discuss it with family members.  EEG was normal.  She still has some intention tremor     OBJECTIVE     Vital signs in last 24 hours  Temp:  [97.6  F (36.4  C)-98.7  F (37.1  C)] 97.9  F (36.6  C)  Pulse:  [52-75] 53  Resp:  [14-20] 16  BP: ()/(64-93) 98/66  SpO2:  [94 %-100 %] 99 %    Weight:   Wt Readings from Last 1 Encounters:   08/15/21 52.2 kg (115 lb)         I/O last 24 hours    Intake/Output Summary (Last 24 hours) at 8/16/2021 1934  Last data filed at 8/16/2021 1500  Gross per 24 hour   Intake 962 ml   Output --   Net 962 ml     Review of Systems   Pertinent items are noted in HPI.    General Physical Exam: Patient is alert and oriented x 3. Vital signs were reviewed and are documented in EMR. Neck was supple, no carotid bruit, thyromegaly, JVD or lymphadenopathy noted.  Neurological Exam:  Mental status: Alert and oriented x3 no acute distress  Speech: Normal with no dysarthria or aphasia  Cranial nerves: 2 through 12 intact.  She has contusion of the tongue  Motor: Normal mass and tone with 5/5 strength  Reflexes: 1+ with downgoing toes  Sensory: Intact light touch pinprick  Coordination: Dysmetria bilaterally on finger-nose testing  Gait: Deferred for safety     DIAGNOSTIC STUDIES     Pertinent Radiology   Following imaging studies were reviewed:    CT  1.  Mild, generalized atrophy. No focal volume loss.     2.  Scalp swelling. No fracture.     3.  Negative for acute intracranial process.     MRI  1.  Right posterior superior parietal soft tissue contusion/shallow subgaleal hematoma, as seen on prior head CT.     2.  No finding for intracranial hemorrhage, mass, or acute infarct.     3.  Mild volume loss. Small amount of nondescript gliosis is seen within the periventricular white matter.    EEG  Normal awake and drowsy    Pertinent Labs   Lab Results: Personally Reviewed  No results found for this or any previous visit (from the  past 24 hour(s)).      HOSPITAL MEDICATIONS       cloNIDine  0.1 mg Oral Q8H     famotidine  10 mg Oral BID     [START ON 8/23/2021] gabapentin  100 mg Oral Q8H     [START ON 8/21/2021] gabapentin  300 mg Oral Q8H     [START ON 8/19/2021] gabapentin  600 mg Oral Q8H     gabapentin  900 mg Oral Q8H     magnesium oxide  400 mg Oral Daily        Total time spent for face to face visit, reviewing labs/imaging studies, counseling and coordination of care was: 30 Minutes More than 50% of this time was spent on counseling and coordination of care.        This note was dictated using voice recognition software.  Any grammatical or context distortions are unintentional and inherent to the software.
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
## 2021-08-17 NOTE — PLAN OF CARE
NURSING NOTE  0700 - 1500    Problem: Pain Acute  Goal: Acceptable Pain Control and Functional Ability  Outcome: Improving  Intervention: Develop Pain Management Plan    D: Reports of continuous anterior headache <7/10. Declined PRN       PO medication, further states that IV Dilaudid has been the only.      med that's helpful.   A: PRN Dilaudid given x2 (see MAR). Essential oil.   R: 6/10 on reassessment. On other medications per CIWA protocol.      Problem: Alcohol Withdrawal  Goal: Alcohol Withdrawal Symptom Control  Outcome: Improving    D: Afebrile, VSS. CIWA scores of <13, scoring for tremors, anxiety, headaches.      Nausea noted this morning, no emesis. Unsteady gait, SBA x1 with      mobility. Chem dep consult - seen via video visit.  A: Due scheduled meds given. PRN Zofran IV given x1. PRN Ativan       given x2 (see MAR). Essential oil.  R: Nausea resolved. CIWA score of <6. 1400 assessment score of 5, appears      comfortable and calm while doing her nails.      C/O dysuria. Urine is malini, clear. Hospitalsit notified - UA ordered.  Requested for Dulcolax suppository - ordered. Prefers for a dose later in afternoon.

## 2021-08-17 NOTE — PLAN OF CARE
Problem: Alcohol Withdrawal  Goal: Alcohol Withdrawal Symptom Control  Outcome: Improving   Initial CIWA score was 13 so gave 2 mg of PO ativan per protocol-recheck was 7. Later CIWA score was 8 so gave 1 mg of PO ativan per protocol and recheck was 6. Pt scoring for tremors, headache, anxiety, and nausea. IV zofran also given for nausea. Scheduled gabapentin and clonidine given as ordered as well. Will continue to monitor.       Problem: Pain Acute  Goal: Acceptable Pain Control and Functional Ability  Outcome: Improving   Patient stated that the oral dilaudid did not help with the pain at all. Patient has pain to the right side of her head and states it is throbbing and aching. Updated MD Wayne and he changed it to IV dilaudid 0.2 mg which made the pain more tolerable. Pt able to get rest after administration. Will continue to monitor.     Ana El RN 8/16/2021 10:25 PM

## 2021-08-17 NOTE — PROGRESS NOTES
[facility]    Medicine Progress Note - Hospitalist Service       Date of Admission:  8/15/2021    Assessment & Plan          36 year old female admitted on 8/15/2021. She has history of alcohol pancreatitis, alcohol hepatitis, hepatic steatosis, anxiety and migraines.  She takes hydroxyzine and Maxalt as needed. She presented with new onset sz concerning for alcohol related disease      1.  Alcohol withdrawal seizure  - No previous history of seizure  - CT head showed mild generalized atrophy, no focal volume loss  - Checked EEG and MRI per neurology  - Neurochecks, seizure precautions  - Neurology consult appreciated, no antisz meds at this time per neuro     2.  Alcohol abuse, withdrawals  - CIWA protocol, as needed lorazepam  - Clonidine, gabapentin  - Multivitamin, thiamine, folic acid  - Patient had been to [facility] back in 2018 after which she was sober for about 6 months.She then had stressors , such as very difficult divorce.  She is not sure if she would like inpatient CD treatment. She would like to discuss this with her parents--who she states are on the board of [place]  -I strongly suggest today Addiction med consult  - consulted     3.  Hepatic steatosis, as well as alcohol hepatitis now  - Recommend alcohol cessation  -Trend LFt's     4.  Cytopenias  - WBC 3.3, platelets 57, hgb 11  -suspect pan cytopenia form alcohol suppression  -need to trend     5.  Hyponatremia  - Secondary to volume depletion  - IV hydration  -was 131-->now 137 over a period of 2 days     6.  Hypomagnesemia  - Secondary to alcohol abuse  - Oral magnesium, improved       Addendum --now dysuria, send off ua/uc  -ua pos-->start ceftriaxone iv now and follow up cx         Diet: Combination Diet Regular Diet Adult    DVT Prophylaxis: Pneumatic Compression Devices  [place]on Catheter: Not present  Central Lines: None  Code Status: Full Code      Disposition Plan   Expected discharge:  recommended to  prior living arrangement once get through withdrawal, neuro eval, addiction med eval.     The patient's care was discussed with the Patient.    Hope García MD  Hospitalist Service  [facility]  Securely message with the Vocera Web Console (learn more here)  Text page via [employer] Paging/Directory      ______________________________________________________________________    Interval History   She notes some nausea and feels shaky today  She notes she was in treatment at [facility] back 2018  She then went thru difficult divorce and started drinking again  Never had sz before  Now with boyfriend and feels safe with him  Has 2 young kids  Her parents on board of [facility]      Data reviewed today: I reviewed all medications, new labs and imaging results over the last 24 hours. I personally reviewed no images or EKG's today.    Physical Exam   Vital Signs: Temp: 97.6  F (36.4  C) Temp src: Oral BP: 111/76 Pulse: 63   Resp: 19 SpO2: 99 % O2 Device: None (Room air)    Weight: 115 lbs 0 oz  Constitutional: awake, fatigued  Respiratory: No increased work of breathing, good air exchange, clear to auscultation bilaterally, no crackles or wheezing  Cardiovascular: Normal apical impulse, regular rate and rhythm, normal S1 and S2, no S3 or S4, and no murmur noted  GI: normal bowel sounds, soft and non-distended  Skin: no bruising or bleeding  Musculoskeletal: no lower extremity pitting edema present  Neurologic: Mental Status Exam:  Level of Alertness:   Awake, she has tremors now  Neuropsychiatric: General: normal and poor eye contact    Data   Recent Labs   Lab 08/17/21  0718 08/15/21  2017 08/10/21  1251   WBC 3.3* 2.9* 4.2   HGB 11.0* 11.7 12.8    95 98   PLT 57* 66* 150   INR  --  1.08  --     131* 136   POTASSIUM 4.2 3.6 3.7   CHLORIDE 108* 99 103   CO2 21* 20* 18*   BUN 9 6* 7   CR 0.60 0.69 0.55   ANIONGAP 8 12 15*   LANNY 9.0 9.2 8.5   GLC 88 119 79   ALBUMIN 3.6 4.0 4.0   PROTTOTAL  6.4 7.0 7.5   BILITOTAL 1.2* 1.5* 0.8   ALKPHOS 68 71 83   * 125* 173*   * 130* 191*   LIPASE  --   --  43*     Recent Results (from the past 24 hour(s))   MR Brain w/o & w Contrast    Narrative    EXAM: MR BRAIN W/O and W CONTRAST  LOCATION: [facility]  DATE/TIME: 8/16/2021 3:42 PM    INDICATION: Seizure, nontraumatic (age 18-40 years).  COMPARISON: CT brain 8/15/2021.  CONTRAST: 5mL of Gadavist  TECHNIQUE: Routine multiplanar multisequence head MRI without and with intravenous contrast.    FINDINGS:  INTRACRANIAL CONTENTS: No areas of abnormally restricted diffusion to suggest acute or subacute infarct. No areas of abnormal parenchymal susceptibility. No areas of abnormal enhancement.    Mild prominence of the lateral ventricles and sulci. Small amount of nondescript gliosis is seen within the periventricular white matter. Otherwise normal gray-white matter differentiation. No mass effect or midline shift. Cerebellar tonsils are normally   positioned. Visualized upper cervical spinal cord, sella, and corpus callosum are unremarkable. Major skull base vascular flow-voids are preserved.    OSSEOUS STRUCTURES/SOFT TISSUES: Visualized marrow space is unremarkable. There is right posterior parietal superficial soft tissue contusion/shallow subgaleal hematoma, as seen on prior head CT.     ORBITS: No abnormality accounting for technique.     SINUSES/MASTOIDS: No paranasal sinus mucosal disease. No middle ear or mastoid effusion.       Impression    IMPRESSION:  1.  Right posterior superior parietal soft tissue contusion/shallow subgaleal hematoma, as seen on prior head CT.    2.  No finding for intracranial hemorrhage, mass, or acute infarct.    3.  Mild volume loss. Small amount of nondescript gliosis is seen within the periventricular white matter.

## 2021-08-17 NOTE — PROGRESS NOTES
"./79 (BP Location: Left arm)   Pulse 60   Temp 98  F (36.7  C) (Oral)   Resp 16   Ht 1.6 m (5' 3\")   Wt 52.2 kg (115 lb)   LMP  (LMP Unknown)   SpO2 99%   BMI 20.37 kg/m     Patient had her CIWA 1600, the score was 10.  RN tried to replaced PIV but her veins are not holding. Swat was called to see if she could place it but to no avail. PICC team was page at 1640.  Patient was offered oral ativan for her CIWA results but  She refused. She state that \"I prefered to wait until IV is in to take anything\".   "

## 2021-08-17 NOTE — CONSULTS
Care Management Initial Consult    General Information    Assessment completed with: Fabiola Zhao     Type of CM/SW Visit: Initial Assessment    Primary Care Provider verified and updated as needed: yes    Readmission within the last 30 days: other (see comments)   Return Category: Exacerbation of disease  Reason for Consult: discharge planning, substance use concerns    Advance Care Planning: Advance Care Planning Reviewed: other (comment) (pt has no HCD and does not want info)        Communication Assessment  Patient's communication style: spoken language (English or Bilingual)    Hearing Difficulty or Deaf: no   Wear Glasses or Blind: yes    Cognitive  Cognitive/Neuro/Behavioral: WDL                      Living Environment:   People in home: child(enriqueta), dependent, parent(s), other (see comments) (pt)  Fabiola Abe (dad), Yolande (mom), kids' names unknown  Current living Arrangements: house      Able to return to prior arrangements: yes  Living Arrangement Comments: N/A    Family/Social Support:  Care provided by: self  Provides care for: child(enriqueta)  Marital Status:   Parent(s)          Description of Support System:      Support Assessment: Adequate family and caregiver support    Current Resources:   Patient receiving home care services: No     Community Resources: OP Mental Health, Other (see comment) (pt reported plan to get back w/OP therapist for MH)  Equipment currently used at home: none  Supplies currently used at home: None    Employment/Financial:  Employment Status: unemployed     Employment/ Comments: pt recently quit job  Financial Concerns: No concerns identified   Referral to Financial Counselor: No       Lifestyle & Psychosocial Needs:  Social Determinants of Health     Tobacco Use: High Risk     Smoking Tobacco Use: Current Some Day Smoker     Smokeless Tobacco Use: Never Used   Alcohol Use:      Frequency of Alcohol Consumption:      Average Number of Drinks:      Frequency of  Binge Drinking:    Financial Resource Strain:      Difficulty of Paying Living Expenses:    Food Insecurity:      Worried About Running Out of Food in the Last Year:      Ran Out of Food in the Last Year:    Transportation Needs:      Lack of Transportation (Medical):      Lack of Transportation (Non-Medical):    Physical Activity:      Days of Exercise per Week:      Minutes of Exercise per Session:    Stress:      Feeling of Stress :    Social Connections:      Frequency of Communication with Friends and Family:      Frequency of Social Gatherings with Friends and Family:      Attends Jain Services:      Active Member of Clubs or Organizations:      Attends Club or Organization Meetings:      Marital Status:    Intimate Partner Violence:      Fear of Current or Ex-Partner:      Emotionally Abused:      Physically Abused:      Sexually Abused:    Depression:      PHQ-2 Score:    Housing Stability:      Unable to Pay for Housing in the Last Year:      Number of Places Lived in the Last Year:      Unstable Housing in the Last Year:        Functional Status:  Prior to admission patient needed assistance:   Dependent ADLs:: Independent  Dependent IADLs:: Independent  Assesssment of Functional Status: At functional baseline    Mental Health Status:  Mental Health Status: Current Concern  Mental Health Management: Individual Therapy, Medication, Other (see comment) (pt states will see OP MH provider; pt reports past MH meds)    Chemical Dependency Status:  Chemical Dependency Status: Current Concern  Chemical Dependency Management: Previous treatment, AA, Other (see comment) (pt to f/u as outpt w/addiction medicine; given SU[facility] info)          Values/Beliefs:  Spiritual, Cultural Beliefs, Jain Practices, Values that affect care:                 Additional Information:    SW spoke with Dr. Santos from addiction medicine.  She stated that she spoke with pt today and that pt agreed to follow up with her at the  addiction medicine clinic after discharge from hospital.  This would be in regards to pt's alcohol use and management of her medications.  She asked that SW set up pt's first appointment.  She mentioned that she had openings this coming Monday if that day will work for pt.  She provided SW with numbers to schedule: [phone] or [phone].      SW met with pt to complete assessment and address CD consult.  Pt noted to be doing her nails and straightening up her tray area when SW came into the room.  Pt stated that she had a visitor coming to see her soon.  Noted that pt seemed irritated with questions.  Pt appeared somewhat apathetic and used a monotone tone of voice.  Pt's affect seemed off for the situation.  Pt appeared sad.  Pt reported that she recently quit her job as a .  Pt stated that she planned to get [employer] insurance.  Pt's last chemical dependency treatment episode was in May 2018 at [facility] for inpatient treatment.  Pt reported that she was sober for six months following treatment.  She utilized support from family and friends, as well as AA, to maintain sobriety at the time.  Pt stated that she may be interested in outpatient chemical dependency treatment.  SW informed pt about [facility] program and provided pt with brochure.  Pt to review information and SW will follow up with pt tomorrow regarding if she would like referral sent.  Pt confirmed that she was fine with follow up appointmnet with addiction medicine.  She stated that Mondays and Fridays would not work.   SW to try to schedule follow up appointment for pt for Tuesday afternoon, or Wednesday or Thursday.  Pt endorsed history of depression.  Pt reported that she planned to work on getting an appointment with her former mental health therapist.  Pt endorsed previously being on psychotropic medications but is not on them currently.  Pt independent at baseline.  Family to transport.      Mariely Law, MSW, LGSW  08/17/21 5:08 PM

## 2021-08-17 NOTE — PROVIDER NOTIFICATION
PROVIDER NOTIFICATION    Reason for communication: 1)requesting for Dulcolax supp, 2)c/o dysuria/burning with urination, UA/UC?  Team member name: Dr. García  Team member role: Hospitalist  Method of Communication: text paged  Response: awaiting for response  Response time:   ADDENDUM 1432 - Dulcolax ordered, UA/UC ordered.

## 2021-08-18 LAB
ALBUMIN SERPL-MCNC: 3.5 G/DL (ref 3.5–5)
ALP SERPL-CCNC: 72 U/L (ref 45–120)
ALT SERPL W P-5'-P-CCNC: 207 U/L (ref 0–45)
ANION GAP SERPL CALCULATED.3IONS-SCNC: 6 MMOL/L (ref 5–18)
AST SERPL W P-5'-P-CCNC: 233 U/L (ref 0–40)
BACTERIA UR CULT: NORMAL
BILIRUB SERPL-MCNC: 0.8 MG/DL (ref 0–1)
BUN SERPL-MCNC: 11 MG/DL (ref 8–22)
CALCIUM SERPL-MCNC: 8.8 MG/DL (ref 8.5–10.5)
CHLORIDE BLD-SCNC: 104 MMOL/L (ref 98–107)
CO2 SERPL-SCNC: 23 MMOL/L (ref 22–31)
CREAT SERPL-MCNC: 0.64 MG/DL (ref 0.6–1.1)
ERYTHROCYTE [DISTWIDTH] IN BLOOD BY AUTOMATED COUNT: 12.7 % (ref 10–15)
FERRITIN SERPL-MCNC: 1210 NG/ML (ref 10–130)
GFR SERPL CREATININE-BSD FRML MDRD: >90 ML/MIN/1.73M2
GLUCOSE BLD-MCNC: 104 MG/DL (ref 70–125)
HCT VFR BLD AUTO: 30.6 % (ref 35–47)
HGB BLD-MCNC: 10.3 G/DL (ref 11.7–15.7)
IRON SATN MFR SERPL: 38 % (ref 20–50)
IRON SERPL-MCNC: 115 UG/DL (ref 42–175)
MAGNESIUM SERPL-MCNC: 1.7 MG/DL (ref 1.8–2.6)
MCH RBC QN AUTO: 33.6 PG (ref 26.5–33)
MCHC RBC AUTO-ENTMCNC: 33.7 G/DL (ref 31.5–36.5)
MCV RBC AUTO: 100 FL (ref 78–100)
PLATELET # BLD AUTO: 59 10E3/UL (ref 150–450)
POTASSIUM BLD-SCNC: 3.6 MMOL/L (ref 3.5–5)
PROT SERPL-MCNC: 6.3 G/DL (ref 6–8)
RBC # BLD AUTO: 3.07 10E6/UL (ref 3.8–5.2)
SODIUM SERPL-SCNC: 133 MMOL/L (ref 136–145)
TIBC SERPL-MCNC: 303 UG/DL (ref 313–563)
TRANSFERRIN SERPL-MCNC: 242 MG/DL (ref 212–360)
WBC # BLD AUTO: 3.7 10E3/UL (ref 4–11)

## 2021-08-18 PROCEDURE — 80053 COMPREHEN METABOLIC PANEL: CPT | Performed by: INTERNAL MEDICINE

## 2021-08-18 PROCEDURE — 84466 ASSAY OF TRANSFERRIN: CPT | Performed by: INTERNAL MEDICINE

## 2021-08-18 PROCEDURE — 82105 ALPHA-FETOPROTEIN SERUM: CPT | Performed by: INTERNAL MEDICINE

## 2021-08-18 PROCEDURE — 250N000011 HC RX IP 250 OP 636: Performed by: INTERNAL MEDICINE

## 2021-08-18 PROCEDURE — 87340 HEPATITIS B SURFACE AG IA: CPT | Performed by: INTERNAL MEDICINE

## 2021-08-18 PROCEDURE — 86706 HEP B SURFACE ANTIBODY: CPT | Performed by: INTERNAL MEDICINE

## 2021-08-18 PROCEDURE — 83516 IMMUNOASSAY NONANTIBODY: CPT | Performed by: INTERNAL MEDICINE

## 2021-08-18 PROCEDURE — 120N000001 HC R&B MED SURG/OB

## 2021-08-18 PROCEDURE — 36415 COLL VENOUS BLD VENIPUNCTURE: CPT | Performed by: INTERNAL MEDICINE

## 2021-08-18 PROCEDURE — 83735 ASSAY OF MAGNESIUM: CPT | Performed by: INTERNAL MEDICINE

## 2021-08-18 PROCEDURE — 86038 ANTINUCLEAR ANTIBODIES: CPT | Performed by: INTERNAL MEDICINE

## 2021-08-18 PROCEDURE — 250N000013 HC RX MED GY IP 250 OP 250 PS 637: Performed by: INTERNAL MEDICINE

## 2021-08-18 PROCEDURE — 250N000013 HC RX MED GY IP 250 OP 250 PS 637: Performed by: HOSPITALIST

## 2021-08-18 PROCEDURE — 250N000013 HC RX MED GY IP 250 OP 250 PS 637: Performed by: FAMILY MEDICINE

## 2021-08-18 PROCEDURE — 99233 SBSQ HOSP IP/OBS HIGH 50: CPT | Performed by: INTERNAL MEDICINE

## 2021-08-18 PROCEDURE — 82728 ASSAY OF FERRITIN: CPT | Performed by: INTERNAL MEDICINE

## 2021-08-18 PROCEDURE — 999N000127 HC STATISTIC PERIPHERAL IV START W US GUIDANCE

## 2021-08-18 PROCEDURE — 86803 HEPATITIS C AB TEST: CPT | Performed by: INTERNAL MEDICINE

## 2021-08-18 PROCEDURE — 85027 COMPLETE CBC AUTOMATED: CPT | Performed by: INTERNAL MEDICINE

## 2021-08-18 RX ORDER — CLONIDINE HYDROCHLORIDE 0.1 MG/1
0.1 TABLET ORAL 2 TIMES DAILY
Status: DISCONTINUED | OUTPATIENT
Start: 2021-08-18 | End: 2021-08-19 | Stop reason: HOSPADM

## 2021-08-18 RX ORDER — SENNOSIDES 8.6 MG
1 TABLET ORAL DAILY
Status: DISCONTINUED | OUTPATIENT
Start: 2021-08-18 | End: 2021-08-19 | Stop reason: HOSPADM

## 2021-08-18 RX ORDER — MAGNESIUM OXIDE 400 MG/1
400 TABLET ORAL 2 TIMES DAILY
Status: DISCONTINUED | OUTPATIENT
Start: 2021-08-18 | End: 2021-08-19 | Stop reason: HOSPADM

## 2021-08-18 RX ORDER — POLYETHYLENE GLYCOL 3350 17 G/17G
17 POWDER, FOR SOLUTION ORAL DAILY
Status: DISCONTINUED | OUTPATIENT
Start: 2021-08-18 | End: 2021-08-19 | Stop reason: HOSPADM

## 2021-08-18 RX ADMIN — LORAZEPAM 1 MG: 2 INJECTION INTRAMUSCULAR; INTRAVENOUS at 10:11

## 2021-08-18 RX ADMIN — Medication 100 MG: at 08:31

## 2021-08-18 RX ADMIN — DOCUSATE SODIUM 200 MG: 100 CAPSULE, LIQUID FILLED ORAL at 20:21

## 2021-08-18 RX ADMIN — GABAPENTIN 900 MG: 300 CAPSULE ORAL at 18:33

## 2021-08-18 RX ADMIN — HYDROMORPHONE HYDROCHLORIDE 0.2 MG: 0.2 INJECTION, SOLUTION INTRAMUSCULAR; INTRAVENOUS; SUBCUTANEOUS at 11:59

## 2021-08-18 RX ADMIN — DOCUSATE SODIUM 200 MG: 100 CAPSULE, LIQUID FILLED ORAL at 00:05

## 2021-08-18 RX ADMIN — HYDROMORPHONE HYDROCHLORIDE 0.2 MG: 0.2 INJECTION, SOLUTION INTRAMUSCULAR; INTRAVENOUS; SUBCUTANEOUS at 21:15

## 2021-08-18 RX ADMIN — HYDROMORPHONE HYDROCHLORIDE 0.2 MG: 0.2 INJECTION, SOLUTION INTRAMUSCULAR; INTRAVENOUS; SUBCUTANEOUS at 05:13

## 2021-08-18 RX ADMIN — IBUPROFEN 600 MG: 600 TABLET ORAL at 04:03

## 2021-08-18 RX ADMIN — MAGNESIUM OXIDE TAB 400 MG (241.3 MG ELEMENTAL MG) 400 MG: 400 (241.3 MG) TAB at 08:31

## 2021-08-18 RX ADMIN — CLONIDINE HYDROCHLORIDE 0.1 MG: 0.1 TABLET ORAL at 01:05

## 2021-08-18 RX ADMIN — GABAPENTIN 900 MG: 300 CAPSULE ORAL at 01:05

## 2021-08-18 RX ADMIN — HYDROMORPHONE HYDROCHLORIDE 0.2 MG: 0.2 INJECTION, SOLUTION INTRAMUSCULAR; INTRAVENOUS; SUBCUTANEOUS at 15:56

## 2021-08-18 RX ADMIN — FAMOTIDINE 10 MG: 10 TABLET ORAL at 19:09

## 2021-08-18 RX ADMIN — POLYETHYLENE GLYCOL 3350 17 G: 17 POWDER, FOR SOLUTION ORAL at 10:11

## 2021-08-18 RX ADMIN — HYDROMORPHONE HYDROCHLORIDE 0.2 MG: 0.2 INJECTION, SOLUTION INTRAMUSCULAR; INTRAVENOUS; SUBCUTANEOUS at 19:09

## 2021-08-18 RX ADMIN — GABAPENTIN 900 MG: 300 CAPSULE ORAL at 09:51

## 2021-08-18 RX ADMIN — FOLIC ACID 1 MG: 1 TABLET ORAL at 08:31

## 2021-08-18 RX ADMIN — MAGNESIUM OXIDE TAB 400 MG (241.3 MG ELEMENTAL MG) 400 MG: 400 (241.3 MG) TAB at 20:21

## 2021-08-18 RX ADMIN — HYDROMORPHONE HYDROCHLORIDE 0.2 MG: 0.2 INJECTION, SOLUTION INTRAMUSCULAR; INTRAVENOUS; SUBCUTANEOUS at 08:31

## 2021-08-18 RX ADMIN — DOCUSATE SODIUM 200 MG: 100 CAPSULE, LIQUID FILLED ORAL at 08:31

## 2021-08-18 RX ADMIN — HYDROMORPHONE HYDROCHLORIDE 0.2 MG: 0.2 INJECTION, SOLUTION INTRAMUSCULAR; INTRAVENOUS; SUBCUTANEOUS at 01:05

## 2021-08-18 RX ADMIN — IBUPROFEN 600 MG: 600 TABLET ORAL at 21:16

## 2021-08-18 RX ADMIN — SENNOSIDES 1 TABLET: 8.6 TABLET, FILM COATED ORAL at 10:10

## 2021-08-18 RX ADMIN — CEFTRIAXONE SODIUM 1 G: 1 INJECTION, POWDER, FOR SOLUTION INTRAMUSCULAR; INTRAVENOUS at 16:01

## 2021-08-18 RX ADMIN — IBUPROFEN 600 MG: 600 TABLET ORAL at 16:00

## 2021-08-18 RX ADMIN — FAMOTIDINE 10 MG: 10 TABLET ORAL at 08:31

## 2021-08-18 RX ADMIN — LORAZEPAM 0.5 MG: 0.5 TABLET ORAL at 00:05

## 2021-08-18 RX ADMIN — CLONIDINE HYDROCHLORIDE 0.1 MG: 0.1 TABLET ORAL at 20:19

## 2021-08-18 NOTE — CONSULTS
[facility] Consult     Impression:     Patient is a 36-year-old female with longstanding history of waxing and waning alcohol abuse.  She has been drinking 2 bottles of wine a day for the last 3 weeks.  She has had abnormal liver function tests since at least September 2020 as far as I can go back in the records.  She is alert and otherwise feels well at this juncture.  CT imaging shows fatty changes in the liver this admission.  She has significant psychosocial stressors as well.  She denies any IV drug use.  Imaging suggest fatty liver.  She has low platelets which could suggest significant fibrosis or cirrhosis.  Differential diagnosis includes cirrhosis from chronic alcoholism versus underlying liver process such as hepatitis C or other metabolic/hereditary issues.  We will plan on checking serologies for underlying liver disease and making arrangements for a FibroScan as an outpatient to assess degree of fibrosis.  We will see her back in the office as an outpatient in 6 to 8 weeks for assessment.  Orders have been placed through our office and they will contact her for arrangements.  I do not think we will be doing another testing in the hospital at this time.    Recommendation:     Recommend ongoing alcohol treatment as outpatient indefinitely  Labs to look for underlying viral, metabolic and hereditary diseases that my coexist with her alcoholism.  We'll review at outpatient clinic visit.  Outpatient Fibroscan in 6-8 weeks  [facility] after Fibroscan is completed  Our office will contact her for appointments    We will sign off at this time.    Name: Fabiola Barrera    Medical Record #: [id number]    YOB: 1985    Date/Time: 8/18/2021/12:24 PM    Reason for Consultation: Hope García MD has asked me to evaluate Fabiola Barrera regarding abnormal liver function tests.    HPI:     The patient is a 36-year-old female with a history of alcohol abuse who we are asked to see because  of abnormal liver function tests.  She was admitted to [facility] from the ED on 8/15/2021 after having withdrawal seizure.  She has never had a seizure before.  She has had no seizures in the hospital.  She has undergone chemical dependency treatment back in [facility] in 2018.  She has had waxing and waning periods of sobriety.  Over the last 3 weeks, she has drank a couple bottles of wine per day.  She is back living with her parents and recently resigned a stressful job.  She is  and has 2 children.  She denies fevers, chills, nausea, vomiting, melena hematochezia.  She has not been seen at [facility] in the past.  There is family history of a brother who is in recovery from alcoholism at this time.  There is no other known underlying family history of liver disease.  No IV drug use.  She does have tattoos.  She denies any GI related malignancies.  He is comfortable at this time.  In review of her liver function tests, she has had abnormalities in her transaminases since at least September 2020 when she was hospitalized for pancreatitis.    Review of Systems (ROS):    Complete ROS otherwise negative except for as above.    Past Medical History:  Past Medical History:   Diagnosis Date     Alcohol use disorder, severe, dependence (H) 8/17/2021     Depression      Depression      Migraine      Migraine      Pancreatitis      Pancreatitis        Medications:   Medications Prior to Admission   Medication Sig Dispense Refill Last Dose     alum & mag hydroxide-simethicone (MAALOX) 200-200-20 MG/5ML SUSP suspension Take 30 mLs by mouth every 4 hours as needed for indigestion 100 mL 0      calcium carbonate (TUMS) 500 MG chewable tablet Take 1 tablet (500 mg) by mouth daily as needed for heartburn 30 tablet 0      famotidine (PEPCID) 40 MG tablet Take 1 tablet (40 mg) by mouth daily (Patient taking differently: Take 40 mg by mouth daily as needed for heartburn ) 30 tablet 0      folic acid (FOLVITE) 1  MG tablet Take 1 tablet (1 mg) by mouth daily 30 tablet 0 when remembers     hydrOXYzine (ATARAX) 50 MG tablet Take 1 tablet (50 mg) by mouth every 6 hours as needed for anxiety 120 tablet 0      melatonin 5 MG tablet Take 1 tablet (5 mg) by mouth every evening as needed for sleep (Patient taking differently: Take 5 mg by mouth At Bedtime ) 30 tablet 0 8/14/2021 at Unknown time     multivitamin w/minerals (THERA-VIT-M) tablet Take 1 tablet by mouth daily 30 tablet 0 when remembers     polyethylene glycol (MIRALAX) 17 GM/Dose powder Take 17 g by mouth daily (Patient taking differently: Take 17 g by mouth daily as needed for constipation ) 510 g 0      rizatriptan (MAXALT-MLT) 10 MG ODT Take 10 mg by mouth every 2 hours as needed for migraine        traZODone (DESYREL) 50 MG tablet Take 1 tablet (50 mg) by mouth nightly as needed for sleep 30 tablet 0        Current Facility-Administered Medications:      bisacodyl (DULCOLAX) Suppository 10 mg, 10 mg, Rectal, Daily PRN, Hope García MD, 10 mg at 08/17/21 2143     cefTRIAXone (ROCEPHIN) 1 g vial to attach to  mL bag for ADULTS or NS 50 mL bag for PEDS, 1 g, Intravenous, Q24H, Hope García MD, 1 g at 08/17/21 1704     cloNIDine (CATAPRES) tablet 0.1 mg, 0.1 mg, Oral, BID, Hope García MD     docusate sodium (COLACE) capsule 200 mg, 200 mg, Oral, BID, Florentino Blake MD, 200 mg at 08/18/21 0831     famotidine (PEPCID) tablet 10 mg, 10 mg, Oral, BID, Florentino Blake MD, 10 mg at 08/18/21 0831     flumazenil (ROMAZICON) injection 0.2 mg, 0.2 mg, Intravenous, q1 min prn, Florentino Blake MD     folic acid (FOLVITE) tablet 1 mg, 1 mg, Oral, Daily, Hope García MD, 1 mg at 08/18/21 0831     gabapentin (NEURONTIN) capsule 900 mg, 900 mg, Oral, Q8H, Yohana Santos MD, 900 mg at 08/18/21 0951     OLANZapine zydis (zyPREXA) ODT tab 5 mg, 5 mg, Oral, Q6H PRN **OR** haloperidol lactate (HALDOL) injection 2 mg, 2 mg,  Intravenous, Q6H PRN, Gregory Wayne, DIANA     HYDROmorphone (DILAUDID) injection 0.2 mg, 0.2 mg, Intravenous, Q2H PRN, Gregory Wayne MBBS, 0.2 mg at 08/18/21 1159     ibuprofen (ADVIL/MOTRIN) tablet 600 mg, 600 mg, Oral, Q6H PRN, Florentino Blake MD, 600 mg at 08/18/21 0403     LORazepam (ATIVAN) tablet 1-2 mg, 1-2 mg, Oral, Q30 Min PRN **OR** LORazepam (ATIVAN) injection 1 mg, 1 mg, Intravenous, Q30 Min PRN, Gregory Wayne MBBS, 1 mg at 08/18/21 1011     magnesium oxide (MAG-OX) tablet 400 mg, 400 mg, Oral, BID, Hope García MD, 400 mg at 08/18/21 0831     melatonin tablet 10 mg, 10 mg, Oral, At Bedtime PRN, Gregory Wayne MBBS     naloxone (NARCAN) injection 0.2 mg, 0.2 mg, Intravenous, Q2 Min PRN **OR** naloxone (NARCAN) injection 0.4 mg, 0.4 mg, Intravenous, Q2 Min PRN **OR** naloxone (NARCAN) injection 0.2 mg, 0.2 mg, Intramuscular, Q2 Min PRN **OR** naloxone (NARCAN) injection 0.4 mg, 0.4 mg, Intramuscular, Q2 Min PRN, Gregory Wayne, DIANA     ondansetron (ZOFRAN-ODT) ODT tab 4 mg, 4 mg, Oral, Q6H PRN **OR** ondansetron (ZOFRAN) injection 4 mg, 4 mg, Intravenous, Q6H PRN, Florentino Blake MD, 4 mg at 08/17/21 0739     polyethylene glycol (MIRALAX) Packet 17 g, 17 g, Oral, Daily, Hope García MD, 17 g at 08/18/21 1011     prochlorperazine (COMPAZINE) injection 10 mg, 10 mg, Intravenous, Q6H PRN **OR** prochlorperazine (COMPAZINE) tablet 10 mg, 10 mg, Oral, Q6H PRN **OR** prochlorperazine (COMPAZINE) suppository 25 mg, 25 mg, Rectal, Q12H PRN, Florentino Blake MD     sennosides (SENOKOT) tablet 1 tablet, 1 tablet, Oral, Daily, Hope García MD, 1 tablet at 08/18/21 1010     thiamine (B-1) tablet 100 mg, 100 mg, Oral, Daily, Hope García MD, 100 mg at 08/18/21 0831       Allergies: Phenazopyridine and Oxycodone-acetaminophen    Family History:  Family History   Problem Relation Age of Onset     Nephrolithiasis Maternal Grandfather        Social  "History:  Social History     Tobacco Use     Smoking status: Current Some Day Smoker     Smokeless tobacco: Never Used     Tobacco comment: 1 cigarette every other week   Substance Use Topics     Alcohol use: Yes     Alcohol/week: 0.0 - 10.0 standard drinks     Comment: Alcoholic Drinks/day: varies from none to several drinks a day, drinks bi weekly     Drug use: Never       Physical Exam: BP 95/68 (BP Location: Right arm)   Pulse 72   Temp 97.6  F (36.4  C) (Oral)   Resp 16   Ht 1.6 m (5' 3\")   Wt 52.2 kg (115 lb)   LMP  (LMP Unknown)   SpO2 99%   BMI 20.37 kg/m      General: NAD  Eyes: No scleral icterus or conjunctivitis  Oropharynx: WNL  Neck/Thyroid: No neck masses or thyromegaly  Pulmonary: Lungs are clear to auscultation bilaterally  Cardiovascular: Regular, no lower extermity edema   Gastrointestinal: Positive bowel sounds, soft, non-tender, no rebound or guarding. No HSM.  Lymph nodes: No cervical or supraclavicular lymphadenopathy  Skin: The patient is not jaundiced.  Back: No spinal/paraspinal tenderness, no CVA tenderness.  Neurologic: Alert and oriented ×3  Musculoskeletal:  Normal muscle tone    Labs:  CMP Results:   Recent Labs   Lab Test 08/18/21  0603   *   POTASSIUM 3.6   CHLORIDE 104   CO2 23   ANIONGAP 6      BUN 11   CR 0.64   BILITOTAL 0.8   ALKPHOS 72   *   *      CBC  Recent Labs   Lab 08/18/21  0603 08/17/21  0718 08/15/21  2017   WBC 3.7* 3.3* 2.9*   RBC 3.07* 3.26* 3.48*   HGB 10.3* 11.0* 11.7   HCT 30.6* 32.7* 33.2*    100 95   MCH 33.6* 33.7* 33.6*   MCHC 33.7 33.6 35.2   RDW 12.7 12.9 12.8   PLT 59* 57* 66*     INR  Recent Labs   Lab 08/15/21  2017   INR 1.08      Lipase   Date Value Ref Range Status   08/10/2021 43 (L) 73 - 393 U/L Final   07/22/2021 110 (H) 0 - 52 U/L Final     Comment:     Verify LFTs trending down before discharge todayVe   07/20/2021 417 (H) 0 - 52 U/L Final     Comment:     Standard Replacement.       Radiology:  XR Chest " 2 Views    Result Date: 7/20/2021  EXAM: XR CHEST 2 VW LOCATION: [facility] DATE/TIME: 7/20/2021 8:44 PM INDICATION: breathlessness in pt with pancreatitis, evaluate for inflammation in lower lung fields COMPARISON: Chest CT 09/10/2020, Abdomen CT earlier today     IMPRESSION: Lungs are clear. No effusions. Heart and pulmonary vascularity are normal. No signs of acute disease.    MR Brain w/o & w Contrast    Result Date: 8/16/2021  EXAM: MR BRAIN W/O and W CONTRAST LOCATION: [facility] DATE/TIME: 8/16/2021 3:42 PM INDICATION: Seizure, nontraumatic (age 18-40 years). COMPARISON: CT brain 8/15/2021. CONTRAST: 5mL of Gadavist TECHNIQUE: Routine multiplanar multisequence head MRI without and with intravenous contrast. FINDINGS: INTRACRANIAL CONTENTS: No areas of abnormally restricted diffusion to suggest acute or subacute infarct. No areas of abnormal parenchymal susceptibility. No areas of abnormal enhancement. Mild prominence of the lateral ventricles and sulci. Small amount of nondescript gliosis is seen within the periventricular white matter. Otherwise normal gray-white matter differentiation. No mass effect or midline shift. Cerebellar tonsils are normally  positioned. Visualized upper cervical spinal cord, sella, and corpus callosum are unremarkable. Major skull base vascular flow-voids are preserved. OSSEOUS STRUCTURES/SOFT TISSUES: Visualized marrow space is unremarkable. There is right posterior parietal superficial soft tissue contusion/shallow subgaleal hematoma, as seen on prior head CT. ORBITS: No abnormality accounting for technique. SINUSES/MASTOIDS: No paranasal sinus mucosal disease. No middle ear or mastoid effusion.     IMPRESSION: 1.  Right posterior superior parietal soft tissue contusion/shallow subgaleal hematoma, as seen on prior head CT. 2.  No finding for intracranial hemorrhage, mass, or acute infarct. 3.  Mild volume loss. Small amount of  nondescript gliosis is seen within the periventricular white matter.    CT Abdomen Pelvis w Contrast    Result Date: 7/20/2021  EXAM: CT ABDOMEN PELVIS W CONTRAST LOCATION: [facility] DATE/TIME: 7/20/2021 8:39 PM INDICATION: Epigastric pain COMPARISON: Ultrasound 7/19/2021. CT 9/10/2020 TECHNIQUE: CT scan of the abdomen and pelvis was performed following injection of IV contrast. Multiplanar reformats were obtained. Dose reduction techniques were used. CONTRAST: isovue 370 100ml FINDINGS: LOWER CHEST: Normal. HEPATOBILIARY: Prominent diffuse fatty infiltration of liver unchanged. PANCREAS: Normal. Resolution of previous peripancreatic inflammation. SPLEEN: Normal. ADRENAL GLANDS: Normal. KIDNEYS/BLADDER: Normal appearance of kidneys, no stones or hydronephrosis. There does appear to be subtle enhancement of the uroepithelium involving proximal right ureter, no ureteral stone. There is a large air-fluid level within the bladder, presumably from recent catheterization. There does appear to be mild bladder wall thickening. No bladder stones. BOWEL: No obstruction or inflammatory change. Appendix normal. LYMPH NODES: Normal. VASCULATURE: Unremarkable. PELVIC ORGANS: IUD in good position. No adnexal lesions. Small volume of free pelvic fluid likely physiologic. MUSCULOSKELETAL: Normal.     IMPRESSION: 1.  Subtle enhancement of right ureter raises question of ureteral inflammation, no CT evidence for pyelonephritis. 2.  Air-fluid level within the bladder, presumably from recent catheterization. Minimal bladder wall thickening. 3.  Resolution of peripancreatic edema. 4.  Prominent fatty infiltration of liver unchanged.    Head CT w/o contrast    Result Date: 8/15/2021  EXAM: CT HEAD W/O CONTRAST LOCATION: [facility] DATE/TIME: 8/15/2021 8:46 PM INDICATION: Injury. Pain. Seizure. COMPARISON: None. TECHNIQUE: Routine CT Head without IV contrast. Multiplanar reformats. Dose  reduction techniques were used. FINDINGS: INTRACRANIAL CONTENTS: No intracranial hemorrhage, extraaxial collection, or mass effect.  No CT evidence of acute infarct. Normal parenchymal attenuation. Mild generalized volume loss. No hydrocephalus. VISUALIZED ORBITS/SINUSES/MASTOIDS: No intraorbital abnormality. No paranasal sinus mucosal disease. No middle ear or mastoid effusion. BONES/SOFT TISSUES: Right parietal and mid right frontal convexity scalp swelling/hematoma noted. No associated fracture or evidence for intracranial injury.     IMPRESSION: 1.  Mild, generalized atrophy. No focal volume loss. 2.  Scalp swelling. No fracture. 3.  Negative for acute intracranial process.    CT Head w/o Contrast    Result Date: 8/10/2021  CT OF THE HEAD WITHOUT CONTRAST 8/10/2021 3:39 PM COMPARISON: None HISTORY: New, atypical headache. Vision changes. Nausea. Weakness. TECHNIQUE: 5 mm thick axial CT images of the head were acquired without IV contrast material. FINDINGS: There is mild diffuse cerebral volume loss. There are subtle patchy areas of decreased density in the cerebral white matter bilaterally that are consistent with sequela of chronic small vessel ischemic disease. The ventricles and basal cisterns are within normal limits in configuration given the degree of cerebral volume loss.  There is no midline shift. There are no extra-axial fluid collections. No intracranial hemorrhage, mass or recent infarct. The visualized paranasal sinuses are well-aerated. There is no mastoiditis. There are no fractures of the visualized bones.     IMPRESSION: Diffuse cerebral volume loss and cerebral white matter changes consistent with chronic small vessel ischemic disease. No evidence for acute intracranial pathology. Radiation dose for this scan was reduced using automated exposure control, adjustment of the mA and/or kV according to patient size, or iterative reconstruction technique DAGMAR LAYTON MD   SYSTEM ID:   [id number]                                                    Ezekiel Allred MD  Thank you for the opportunity to participate in the care of this patient.   Please feel free to call me with any questions or concerns.  Phone number [phone].

## 2021-08-18 NOTE — PROGRESS NOTES
Care Management Follow Up    Length of Stay (days): 3    Expected Discharge Date: 08/19/2021     Concerns to be Addressed: discharge planning, substance/tobacco abuse/use       Patient plan of care discussed at interdisciplinary rounds: Yes    Anticipated Discharge Disposition: Home     Anticipated Discharge Services:  None    Anticipated Discharge DME:  none    Patient/family educated on Medicare website which has current facility and service quality ratings:  N/A    Education Provided on the Discharge Plan:  N/A    Patient/Family in Agreement with the Plan: yes    Referrals Placed by CM/SW:  XU set up f/u appt for pt w/addiction medicine    Private pay costs discussed: N/A    Additional Information: XU spoke with Dr. Schneider.  Pt's liver tests are abnormal.  She will have GI see the pt.  Pt is open to follow up appointments with addiction medicine and possibly other CD resources.      XU tried several times to get through on the scheduling line, and then was on hold for a long time as well.  XU spoke with Chrissy.  XU scheduled next available appointment for pt with Dr. Santos in addiction medicine for 9/20/21 at 10:15 AM.  This is a 60 minute virtual appointment.  Return appointments will be approximately thirty minutes long.  Chrissy will send a confirmation e-mail to pt and also leave a voice mail message.  Pt will need to follow instructions in the e-mail to set up her [employer] account.  On the day of pt's appointment, they will send pt a link about ten minutes before appointment.      XU met briefly with pt to discuss discharge planning.  Pt confirmed that she received e-mail from clinic regarding setting up her my chart account.  She stated that the e-mail did not list the details of the appointment.  XU wrote down the date and time of the appointment, that it was virtual, and that it was with Dr. Santos, and provided this written information to pt.  XU checked in with pt about SUDS program.  After pt  learned she would have to sign release of information and consent form for referral, she stated that she did not want a referral sent.  She stated that she had her own chemical dependency resources.  Pt stated that she was considering outpatient program through [facility].  Noted that pt seemed a little less irritable and more alert today.  Pt still provided short, ginger answers to SW's questions and appeared frustrated and put out by having to answer more questions.        GRACIELA James, NEO 08/18/21 6:01 PM

## 2021-08-18 NOTE — PLAN OF CARE
Problem: Adult Inpatient Plan of Care  Goal: Optimal Comfort and Wellbeing  Outcome: No Change     Pt scoring a 6 on CIWA r/t anxiety and HA. Denies nausea. Rates HA at a 6/10.  VSS on RA.   Requesting IV Dilaudid often, was given x2. Pt asleep within 20 minutes after.  Ibuprofen given x1.  Pt SBA with bed alarm on, adequate urine output.  Was started on Colace for constipation.  Ativan was also given at start of shift per pt request.  Pt has flat affect, and doesn't appear to look anxious or in any distress.

## 2021-08-18 NOTE — PLAN OF CARE
"./82 (BP Location: Right arm)   Pulse 68   Temp 98  F (36.7  C) (Oral)   Resp 16   Ht 1.6 m (5' 3\")   Wt 52.2 kg (115 lb)   LMP  (LMP Unknown)   SpO2 97%   BMI 20.37 kg/m    Patient complained of a 9 out of 10 patient requests Dilaudid every 2 hours for her headache. RN offers Ibuprofen instead of the Dilaudid but patient said \"No\" so RN asked what about both to help decreased the headache even more,  she accept that plan. Patient received both medications, pain decreased to 3 to 4 ( patient said). Patient also complained of constipation and requested a suppository. Patient received it. Continue to monitor for results. CIWA scores tonight where 10 ,8 , 5, 4. Patient received ativan per orders. Krystal Iraheta, RN      "

## 2021-08-18 NOTE — PROGRESS NOTES
[facility]    Medicine Progress Note - Hospitalist Service       Date of Admission:  8/15/2021    Assessment & Plan           36 year old female admitted on 8/15/2021. She has history of alcohol pancreatitis, alcohol hepatitis, hepatic steatosis, anxiety and migraines.  She takes hydroxyzine and Maxalt as needed. She presented with new onset sz concerning for alcohol related disease      1.  Alcohol withdrawal seizure  - No previous history of seizure  - CT head showed mild generalized atrophy, no focal volume loss  - Checked EEG and MRI per neurology  - Neurochecks, seizure precautions  - Neurology consult appreciated, no antisz meds at this time per neuro     2.  Alcohol abuse, withdrawals  - CIWA protocol, as needed lorazepam overnight and notes still feels shaky and has headache  - Clonidine, gabapentin(plan on staying on gabapentin per Addiction med at time of discharge will be on 900 mg tid)  - Multivitamin, thiamine, folic acid  - Patient had been to [facility] back in 2018 after which she was sober for about 6 months.She then had stressors , such as very difficult divorce.  She is not sure if she would like inpatient CD treatment. She would like to discuss this with her parents--who she states are on the board of [employer]  -greatly appreciate  Addiction med consult this admit  - consulted     3.  Hepatic steatosis, as well as alcohol hepatitis now  - Recommend alcohol cessation  -Trend LFt's--still rising  -her lft's were up as well back in July   -will ask GI to see her     4.  Cytopenias  - ongoing  -suspect pan cytopenia form alcohol suppression  -need to trend     5.  Hyponatremia  - Secondary to volume depletion  - IV hydration  -was 131-->now 133     6.  Hypomagnesemia  - Secondary to alcohol abuse  - Oral magnesium, improved, continue replacement    7.UTI  -complained of dysuria on 8/17  -ua pos  -started ceftriaxone iv  -follow up uc             Diet: Combination  Diet Regular Diet Adult    DVT Prophylaxis: Pneumatic Compression Devices--no hep with low plt  [place] Catheter: Not present  Central Lines: None  Code Status: Full Code      Disposition Plan   Expected discharge: 08/19/2021 recommended to prior living arrangement once gi eval, improved symptoms of withdrawal.     The patient's care was discussed with the Patient.    Hope García MD  Hospitalist Service  [facility]  Securely message with the Vocera Web Console (learn more here)  Text page via [employer] Paging/Directory      ______________________________________________________________________    Interval History   She notes that she needed ativan overnight  And she notes headache still and needed dilaudid  Still feels shaky  No stool yet  Eating ok      Data reviewed today: I reviewed all medications, new labs and imaging results over the last 24 hours. I personally reviewed no images or EKG's today.    Physical Exam   Vital Signs: Temp: 98  F (36.7  C) Temp src: Oral BP: 103/72 Pulse: 66   Resp: 16 SpO2: 98 % O2 Device: None (Room air)    Weight: 115 lbs 0 oz  Constitutional: awake, fatigued  Respiratory: No increased work of breathing, good air exchange, clear to auscultation bilaterally, no crackles or wheezing  Cardiovascular: Normal apical impulse, regular rate and rhythm, normal S1 and S2, no S3 or S4, and no murmur noted  GI: normal bowel sounds, soft and non-distended  Skin: no bruising or bleeding  Musculoskeletal: no lower extremity pitting edema present  Neurologic: Mental Status Exam:  Level of Alertness:   awake  Neuropsychiatric: General: normal and poor eye contact  Affect: flat    Data   Recent Labs   Lab 08/18/21  0603 08/17/21  0718 08/15/21  2017   WBC 3.7* 3.3* 2.9*   HGB 10.3* 11.0* 11.7    100 95   PLT 59* 57* 66*   INR  --   --  1.08   * 137 131*   POTASSIUM 3.6 4.2 3.6   CHLORIDE 104 108* 99   CO2 23 21* 20*   BUN 11 9 6*   CR 0.64 0.60 0.69   ANIONGAP  6 8 12   LANNY 8.8 9.0 9.2    88 119   ALBUMIN 3.5 3.6 4.0   PROTTOTAL 6.3 6.4 7.0   BILITOTAL 0.8 1.2* 1.5*   ALKPHOS 72 68 71   * 147* 125*   * 179* 130*     No results found for this or any previous visit (from the past 24 hour(s)).

## 2021-08-18 NOTE — PLAN OF CARE
Problem: Alcohol Withdrawal  Goal: Alcohol Withdrawal Symptom Control  Outcome: No Change     Problem: Pain Acute  Goal: Acceptable Pain Control and Functional Ability  Outcome: No Change  Intervention: Develop Pain Management Plan  Recent Flowsheet Documentation  Taken 8/18/2021 0831 by Juan Dempsey RN  Pain Management Interventions: medication (see MAR)    Pt is scoring 6 and 6 on CIWA.  Having anxiety, headaches and some tremors.  Pt requested for Dilaudid iv x 2, Ativan 1 mg x 1 and Zofran 4 mg x 1.  On telemetry monitoring.  Pt stated that if she sleeping, can I just go ahead and give her Dilaudid when it's time?  Explained to pt that Dilaudid is prn and pt need to call for medication when she's in pain.

## 2021-08-19 ENCOUNTER — MEDICAL CORRESPONDENCE (OUTPATIENT)
Dept: HEALTH INFORMATION MANAGEMENT | Facility: CLINIC | Age: 36
End: 2021-08-19

## 2021-08-19 VITALS
WEIGHT: 115 LBS | SYSTOLIC BLOOD PRESSURE: 132 MMHG | OXYGEN SATURATION: 99 % | RESPIRATION RATE: 18 BRPM | DIASTOLIC BLOOD PRESSURE: 92 MMHG | BODY MASS INDEX: 20.38 KG/M2 | TEMPERATURE: 97.6 F | HEART RATE: 54 BPM | HEIGHT: 63 IN

## 2021-08-19 LAB
ALBUMIN SERPL-MCNC: 3.6 G/DL (ref 3.5–5)
ALP SERPL-CCNC: 79 U/L (ref 45–120)
ALT SERPL W P-5'-P-CCNC: 265 U/L (ref 0–45)
ANION GAP SERPL CALCULATED.3IONS-SCNC: 5 MMOL/L (ref 5–18)
AST SERPL W P-5'-P-CCNC: 260 U/L (ref 0–40)
BILIRUB SERPL-MCNC: 0.8 MG/DL (ref 0–1)
BUN SERPL-MCNC: 8 MG/DL (ref 8–22)
CALCIUM SERPL-MCNC: 8.9 MG/DL (ref 8.5–10.5)
CHLORIDE BLD-SCNC: 107 MMOL/L (ref 98–107)
CO2 SERPL-SCNC: 25 MMOL/L (ref 22–31)
CREAT SERPL-MCNC: 0.58 MG/DL (ref 0.6–1.1)
ERYTHROCYTE [DISTWIDTH] IN BLOOD BY AUTOMATED COUNT: 12.7 % (ref 10–15)
GFR SERPL CREATININE-BSD FRML MDRD: >90 ML/MIN/1.73M2
GLUCOSE BLD-MCNC: 84 MG/DL (ref 70–125)
HBV SURFACE AB SERPLBLD QL IA.RAPID: POSITIVE
HBV SURFACE AG SERPL QL IA: NONREACTIVE
HCT VFR BLD AUTO: 31.8 % (ref 35–47)
HCV AB SERPL QL IA: NEGATIVE
HGB BLD-MCNC: 10.8 G/DL (ref 11.7–15.7)
MAGNESIUM SERPL-MCNC: 1.9 MG/DL (ref 1.8–2.6)
MCH RBC QN AUTO: 33.5 PG (ref 26.5–33)
MCHC RBC AUTO-ENTMCNC: 34 G/DL (ref 31.5–36.5)
MCV RBC AUTO: 99 FL (ref 78–100)
PLATELET # BLD AUTO: 83 10E3/UL (ref 150–450)
POTASSIUM BLD-SCNC: 4 MMOL/L (ref 3.5–5)
PROT SERPL-MCNC: 6.5 G/DL (ref 6–8)
RBC # BLD AUTO: 3.22 10E6/UL (ref 3.8–5.2)
SODIUM SERPL-SCNC: 137 MMOL/L (ref 136–145)
VIT B1 PYROPHOSHATE BLD-SCNC: 139 NMOL/L
WBC # BLD AUTO: 2.8 10E3/UL (ref 4–11)

## 2021-08-19 PROCEDURE — 250N000013 HC RX MED GY IP 250 OP 250 PS 637: Performed by: INTERNAL MEDICINE

## 2021-08-19 PROCEDURE — 99232 SBSQ HOSP IP/OBS MODERATE 35: CPT | Mod: 95 | Performed by: FAMILY MEDICINE

## 2021-08-19 PROCEDURE — 80053 COMPREHEN METABOLIC PANEL: CPT | Performed by: INTERNAL MEDICINE

## 2021-08-19 PROCEDURE — 85027 COMPLETE CBC AUTOMATED: CPT | Performed by: INTERNAL MEDICINE

## 2021-08-19 PROCEDURE — 83735 ASSAY OF MAGNESIUM: CPT | Performed by: INTERNAL MEDICINE

## 2021-08-19 PROCEDURE — 250N000013 HC RX MED GY IP 250 OP 250 PS 637: Performed by: FAMILY MEDICINE

## 2021-08-19 PROCEDURE — 250N000013 HC RX MED GY IP 250 OP 250 PS 637: Performed by: HOSPITALIST

## 2021-08-19 PROCEDURE — 99239 HOSP IP/OBS DSCHRG MGMT >30: CPT | Performed by: HOSPITALIST

## 2021-08-19 PROCEDURE — 36415 COLL VENOUS BLD VENIPUNCTURE: CPT | Performed by: INTERNAL MEDICINE

## 2021-08-19 PROCEDURE — 250N000011 HC RX IP 250 OP 636: Performed by: INTERNAL MEDICINE

## 2021-08-19 RX ORDER — ACAMPROSATE CALCIUM 333 MG/1
666 TABLET, DELAYED RELEASE ORAL 3 TIMES DAILY
Status: DISCONTINUED | OUTPATIENT
Start: 2021-08-19 | End: 2021-08-19 | Stop reason: HOSPADM

## 2021-08-19 RX ORDER — GABAPENTIN 300 MG/1
900 CAPSULE ORAL 3 TIMES DAILY
Qty: 270 CAPSULE | Refills: 0 | Status: SHIPPED | OUTPATIENT
Start: 2021-08-19 | End: 2021-09-13

## 2021-08-19 RX ORDER — LANOLIN ALCOHOL/MO/W.PET/CERES
100 CREAM (GRAM) TOPICAL DAILY
Qty: 30 TABLET | Refills: 0 | Status: SHIPPED | OUTPATIENT
Start: 2021-08-19 | End: 2021-09-18

## 2021-08-19 RX ORDER — ACAMPROSATE CALCIUM 333 MG/1
666 TABLET, DELAYED RELEASE ORAL 3 TIMES DAILY
Qty: 180 TABLET | Refills: 0 | Status: SHIPPED | OUTPATIENT
Start: 2021-08-19 | End: 2021-09-13

## 2021-08-19 RX ADMIN — CLONIDINE HYDROCHLORIDE 0.1 MG: 0.1 TABLET ORAL at 08:10

## 2021-08-19 RX ADMIN — IBUPROFEN 600 MG: 600 TABLET ORAL at 06:10

## 2021-08-19 RX ADMIN — FOLIC ACID 1 MG: 1 TABLET ORAL at 08:10

## 2021-08-19 RX ADMIN — MAGNESIUM OXIDE TAB 400 MG (241.3 MG ELEMENTAL MG) 400 MG: 400 (241.3 MG) TAB at 08:10

## 2021-08-19 RX ADMIN — Medication 100 MG: at 08:10

## 2021-08-19 RX ADMIN — SENNOSIDES 1 TABLET: 8.6 TABLET, FILM COATED ORAL at 08:10

## 2021-08-19 RX ADMIN — GABAPENTIN 900 MG: 300 CAPSULE ORAL at 00:52

## 2021-08-19 RX ADMIN — HYDROMORPHONE HYDROCHLORIDE 0.2 MG: 0.2 INJECTION, SOLUTION INTRAMUSCULAR; INTRAVENOUS; SUBCUTANEOUS at 00:52

## 2021-08-19 RX ADMIN — DOCUSATE SODIUM 200 MG: 100 CAPSULE, LIQUID FILLED ORAL at 08:09

## 2021-08-19 RX ADMIN — POLYETHYLENE GLYCOL 3350 17 G: 17 POWDER, FOR SOLUTION ORAL at 08:09

## 2021-08-19 RX ADMIN — ACAMPROSATE CALCIUM ENTERIC-COATED 666 MG: 333 TABLET, DELAYED RELEASE ORAL at 11:29

## 2021-08-19 RX ADMIN — GABAPENTIN 900 MG: 300 CAPSULE ORAL at 10:32

## 2021-08-19 RX ADMIN — HYDROMORPHONE HYDROCHLORIDE 0.2 MG: 0.2 INJECTION, SOLUTION INTRAMUSCULAR; INTRAVENOUS; SUBCUTANEOUS at 08:09

## 2021-08-19 RX ADMIN — FAMOTIDINE 10 MG: 10 TABLET ORAL at 08:10

## 2021-08-19 NOTE — DISCHARGE SUMMARY
.    Discharge Summary     Primary Care Physician: Yohana Garcia  Admission Date: 8/15/2021   Discharge Provider: Dasha Lockett MD Discharge Date: 2021   Diet: regular diet   Code Status: Full Code   Activity: As tolerated        Condition at Discharge: Stable      REASON FOR PRESENTATION(See Admission Note for Details)     Generalzied shaking, concerns for seizure    PRINCIPAL & ACTIVE DISCHARGE DIAGNOSES     Principal Problem:    Alcohol withdrawal seizure without complication (H)  Active Problems:    Generalized anxiety disorder    Hematoma of scalp, initial encounter    Thrombocytopenia (H)    Alcohol use disorder, severe, dependence (H)      SIGNIFICANT FINDINGS (Imaging, labs):     MR Brain w/o & w Contrast  Result Date: 2021  IMPRESSION: 1.  Right posterior superior parietal soft tissue contusion/shallow subgaleal hematoma, as seen on prior head CT. 2.  No finding for intracranial hemorrhage, mass, or acute infarct. 3.  Mild volume loss. Small amount of nondescript gliosis is seen within the periventricular white matter.      Head CT w/o contrast  Result Date: 8/15/2021  IMPRESSION: 1.  Mild, generalized atrophy. No focal volume loss. 2.  Scalp swelling. No fracture. 3.  Negative for acute intracranial process.      EEG Awake or Drowsy Routine  Result Date: 2021  ELECTROENCEPHALOGRAM (EEG) REPORT [facility] NEUROLOGY [address] [place] [address] Tel: [phone]  Fax: [phone] [email or web address] Fabiola Barrera, SERGIO 1985, MRN [id number] PCP: Yohana Garcia Date: 2021 Principal Diagnosis: Alcohol withdrawal seizure History : Patient is being evaluated for seizures. Medications listed includes no anticonvulsants Description the Recording: This is a multichannel digital EEG recording using the international 10-20 placement system. Background of the recording consists of well-regulated and well-modulated 8-9 hz activity with an amplitude of  20-30  V.  This alpha activity is bilaterally symmetrical, has an AP gradient and attenuates with alerting procedures.  Photic stimulation performed with eyes open according to the standard protocol, produces minimal driving response, Good, non fasting hyperventilation performed with standard protocol results in minimal change.  During drowsiness slowing of the background activity in theta range was noted. Later stages of sleep were not obtained. During this recording no sharp discharges, spikes or electrographic seizure activity was recorded. Impression: This is a normal awake and drowsy EEG.  Please note that the absence of epileptiform abnormalities on EEG does not rule out the possibility of seizures. Classification: Normal awake and drowsy Kris Kenney MD [facility] This note was dictated using voice recognition software.  Any grammatical or context distortions are unintentional and inherent to the software.       PENDING LABS     [unfilled]    PROCEDURES ( this hospitalization only)          RECOMMENDATIONS TO OUTPATIENT PROVIDER FOR F/U VISIT         DISPOSITION     Home    SUMMARY OF HOSPITAL COURSE:      Fabiola Barrera is a 36 year old female admitted on 8/15/2021. She has history of alcohol pancreatitis, alcohol hepatitis, hepatic steatosis, anxiety and migraines presented with new onset seizure concerning for alcohol related disease      1.  Alcohol withdrawal seizure  - No previous history of seizure  - CT head showed mild generalized atrophy, no focal volume loss  - MRI and EEG no acute findings.   - Patient evaluated by neurology and did not recommend any anti-epileptics      2.  Alcohol abuse, withdrawals  -Patient was monitored on telemetry and on CIWA protocol.  -Patient was evaluated by Addiction Medicine during this stay, will be discharged on thiamine and gabapentin  as per their recommendations.  Discussed with team on the day of discharge, doesn't recommend any  opoid's/BZD on discharge.   -Declined SUDS referral.  -Will return to out-pt CD treatment at [facility].     3.  Hepatic steatosis, as well as alcohol hepatitis now  -Recommend alcohol cessation  -LFT's continue to trend up  -Evaluated by GI, planned for fibroscan and follow up as out-pt in 6-8 weeks      4.  Pancytopenias - likely due chronic alcoholism.  -Monitor.      5.  Hyponatremia- likely secondary to volume depletion  -Improved prior to discharge     6.  Hypomagnesemia- Secondary to alcohol abuse  - Replaced as needed.      7.UTI  -complained of dysuria on 8/17 but urine cx neg.  -No abx on discharge.     Patient stable to be discharged home today. Please refer to discharge medications and instructions for more details.        Discharge Medications with Med changes:     Current Discharge Medication List      START taking these medications    Details   acamprosate (CAMPRAL) 333 MG EC tablet Take 2 tablets (666 mg) by mouth 3 times daily  Qty: 180 tablet, Refills: 0    Associated Diagnoses: Alcohol use disorder, severe, dependence (H); Alcoholic hepatitis without ascites      gabapentin (NEURONTIN) 300 MG capsule Take 3 capsules (900 mg) by mouth 3 times daily  Qty: 270 capsule, Refills: 0    Associated Diagnoses: Alcohol use disorder, severe, dependence (H)      thiamine (B-1) 100 MG tablet Take 1 tablet (100 mg) by mouth daily  Qty: 30 tablet, Refills: 0    Associated Diagnoses: Alcohol use disorder, severe, dependence (H)         CONTINUE these medications which have NOT CHANGED    Details   alum & mag hydroxide-simethicone (MAALOX) 200-200-20 MG/5ML SUSP suspension Take 30 mLs by mouth every 4 hours as needed for indigestion  Qty: 100 mL, Refills: 0    Associated Diagnoses: Alcoholic hepatitis without ascites      calcium carbonate (TUMS) 500 MG chewable tablet Take 1 tablet (500 mg) by mouth daily as needed for heartburn  Qty: 30 tablet,  Refills: 0    Associated Diagnoses: Alcoholic hepatitis without ascites      famotidine (PEPCID) 40 MG tablet Take 1 tablet (40 mg) by mouth daily  Qty: 30 tablet, Refills: 0    Associated Diagnoses: Alcoholic hepatitis without ascites      folic acid (FOLVITE) 1 MG tablet Take 1 tablet (1 mg) by mouth daily  Qty: 30 tablet, Refills: 0    Associated Diagnoses: Alcoholic hepatitis without ascites      hydrOXYzine (ATARAX) 50 MG tablet Take 1 tablet (50 mg) by mouth every 6 hours as needed for anxiety  Qty: 120 tablet, Refills: 0    Associated Diagnoses: Generalized anxiety disorder      melatonin 5 MG tablet Take 1 tablet (5 mg) by mouth every evening as needed for sleep  Qty: 30 tablet, Refills: 0    Associated Diagnoses: Depression, unspecified depression type      multivitamin w/minerals (THERA-VIT-M) tablet Take 1 tablet by mouth daily  Qty: 30 tablet, Refills: 0    Associated Diagnoses: Alcoholic hepatitis without ascites      polyethylene glycol (MIRALAX) 17 GM/Dose powder Take 17 g by mouth daily  Qty: 510 g, Refills: 0    Associated Diagnoses: Constipation, unspecified constipation type      rizatriptan (MAXALT-MLT) 10 MG ODT Take 10 mg by mouth every 2 hours as needed for migraine      traZODone (DESYREL) 50 MG tablet Take 1 tablet (50 mg) by mouth nightly as needed for sleep  Qty: 30 tablet, Refills: 0    Associated Diagnoses: Depression, unspecified depression type                 Rationale for medication changes:              Consults   Addiction Medicine  GI      Immunizations given this encounter     [unfilled]       Anticoagulation Information      Recent INR results:   Recent Labs   Lab 08/15/21  2017   INR 1.08     Warfarin doses (if applicable) or name of other anticoagulant: N/A      Discharge Orders     Discharge Procedure Orders   MENTAL HEALTH REFERRAL  - Adult; Addiction Medicine Provider; Addiction Medicine Evaluation & Treatment; Addiction Med Consult & Eval - [facility]  & Addiction (Medication Evals Only); Alcohol; Medication Assisted Treatment: Alcohol; ...   Standing Status: Future   Referral Priority: Routine Referral Type: Mental Health Outpatient   Number of Visits Requested: 1     Home Care Social Service Referral for Hospital Discharge   Referral Priority: Routine Referral Type: Consultation   Number of Visits Requested: 1     Reason for your hospital stay   Order Comments: Etoh abuse, alcohol withdrawal seizure     Follow-up and recommended labs and tests    Order Comments: Follow up with primary care provider, Yohana Garcia, within 7 days for hospital follow- up.  No follow up labs or test are needed.    Follow up with Addiction Medicine in 1-2 weeks   Follow up with Mental health for psychotherapy 1-2 weeks  Follow up with GI in 6-8 weeks after fibroscan     Activity   Order Comments: Your activity upon discharge: activity as tolerated     Order Specific Question Answer Comments   Is discharge order? Yes      Diet   Order Comments: Follow this diet upon discharge: Orders Placed This Encounter      Combination Diet Regular Diet Adult     Order Specific Question Answer Comments   Is discharge order? Yes      Examination     Vital Signs in last 24 hours:     General: Pleasant, NAD  HEENT:EOMI, AT,NC  CVS:RRR, no edema  RS:CTAB  Abd: Soft, NT,ND  Neurology:Grossly normal  Psy:Approrpiate affect        Please see EMR for more detailed significant labs, imaging, consultant notes etc.  Total time spent on discharge: 35 minutes    Dasha Lockett MD  [facility] Service: Ph:[phone]    CC:Yohana Garcia

## 2021-08-19 NOTE — PLAN OF CARE
Care Management Discharge Note    Discharge Date: 08/19/2021       Discharge Disposition: Home    Discharge Services: Mental Health Resources, Chemical Dependency Resources    Discharge DME: None    Discharge Transportation: family or friend will provide    Private pay costs discussed: Not applicable    PAS Confirmation Code:    Patient/family educated on Medicare website which has current facility and service quality ratings: no    Education Provided on the Discharge Plan:    Persons Notified of Discharge Plans: pt.  Patient/Family in Agreement with the Plan: yes    Handoff Referral Completed: Yes    Additional Information:  Pt. Discharging home with outpt. Follow up with Medical Addiction MD Lainey Pandey, RN

## 2021-08-19 NOTE — PLAN OF CARE
"./65 (BP Location: Right arm)   Pulse 67   Temp 97.9  F (36.6  C) (Oral)   Resp 16   Ht 1.6 m (5' 3\")   Wt 52.2 kg (115 lb)   LMP  (LMP Unknown)   SpO2 99%   BMI 20.37 kg/m    Patient CIWA scores for this shift were 6 and 4. Patient left IV was leaking and painful so it was removed. PICC team placed a new one on the right arm. Patient rates her pain a 6 out of 10 RN offered Ibuprofen and patient agree to take it. Took it times 2 on this shift it seems to be helpful. Patient continues to request Dilaudid almost every two hours. RN educated patient on the benefits of PO pain medications vs IV. Patient was ok with the education but does not want to let go of IV Dilaudid. Patient had a BM today, burning sensation while urinating has subside. Continue to monitor.Krystal Iraheta, RN  Problem: Pain Acute  Goal: Acceptable Pain Control and Functional Ability  Outcome: No Change  Intervention: Develop Pain Management Plan  Recent Flowsheet Documentation  Taken 8/18/2021 1556 by Krystal Andrew, RN  Pain Management Interventions: medication (see MAR)     Problem: Nausea and Vomiting  Goal: Fluid and Electrolyte Balance  Outcome: Improving     Problem: Alcohol Withdrawal  Goal: Alcohol Withdrawal Symptom Control  Outcome: Improving     "

## 2021-08-19 NOTE — PLAN OF CARE
"Diet: regular  Activity: independent  Pain: yes, dilaudid and ibuprofen   Nausea: no  A&O: yes  Sleep: most of the night    Patient encouraged to use orals, patient stated that in the AM she will try orals only in hopes to go home later in the day        Vital signs stable. /82 (BP Location: Left arm)   Pulse 53   Temp 97.9  F (36.6  C) (Oral)   Resp 16   Ht 1.6 m (5' 3\")   Wt 52.2 kg (115 lb)   LMP  (LMP Unknown)   SpO2 98%   BMI 20.37 kg/m      "

## 2021-08-19 NOTE — PLAN OF CARE
Problem: Adult Inpatient Plan of Care  Goal: Plan of Care Review  Outcome: Adequate for Discharge  Goal: Patient-Specific Goal (Individualized)  Outcome: Adequate for Discharge  Goal: Absence of Hospital-Acquired Illness or Injury  Outcome: Adequate for Discharge  Intervention: Identify and Manage Fall Risk  Recent Flowsheet Documentation  Taken 8/19/2021 0917 by Juan Dempsey RN  Safety Promotion/Fall Prevention:    lighting adjusted    nonskid shoes/slippers when out of bed    patient and family education  Intervention: Prevent Skin Injury  Recent Flowsheet Documentation  Taken 8/19/2021 0917 by Juan Dempsey RN  Body Position: position changed independently  Taken 8/19/2021 0809 by Juan Dempsey RN  Body Position: position changed independently  Intervention: Prevent and Manage VTE (Venous Thromboembolism) Risk  Recent Flowsheet Documentation  Taken 8/19/2021 0917 by Juan Dempsey RN  VTE Prevention/Management: ambulation promoted  Goal: Optimal Comfort and Wellbeing  Outcome: Adequate for Discharge  Goal: Readiness for Transition of Care  Outcome: Adequate for Discharge     Problem: Nausea and Vomiting  Goal: Fluid and Electrolyte Balance  Outcome: Adequate for Discharge     Problem: Alcohol Withdrawal  Goal: Alcohol Withdrawal Symptom Control  Outcome: Adequate for Discharge     Problem: Acute Neurologic Deterioration (Alcohol Withdrawal)  Goal: Optimal Neurologic Function  Outcome: Adequate for Discharge     Problem: Substance Misuse (Alcohol Withdrawal)  Goal: Readiness for Change Identified  Outcome: Adequate for Discharge     Problem: Pain Acute  Goal: Acceptable Pain Control and Functional Ability  Outcome: Adequate for Discharge     Problem: Discharge Planning  Goal: Discharge Planning (Adult, OB, Behavioral, Peds)  Outcome: Adequate for Discharge    Discharge instructions and follow up appointments reviewed with pt.  All belongings are packed and will sent with pt.  Peripheral iv removed.  Family  will  pt.

## 2021-08-19 NOTE — PROGRESS NOTES
Addiction Medicine Progress Note  8/19/2021      Tele-Visit Details     Type of service:  Video Visit  Video Start Time (time video started): 0934    Video End Time (time video stopped): 1007  Originating Location (pt. Location): [facility], patient's room  Distant Location (provider location): writer onsite at [facility]   Reason for Televisit: COVID 19   Mode of Communication:  Video Conference via polycom  Physician has received verbal consent for a video visit from the patient? 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 Patient continues to insist that she martini not want to go to residential treatment. She does plan on going back to intensive outpatient and is working with her parents to arrange for this.       Recommendations:  1. Agree with follow up with GI as outpatient  2. Complete abstinence  3. Continue gabapentin 900 mg 3 times daily at discharge  4. Patient is not a candidate for naltrexone because of the worsening LFTs. Could consider in future if these normalize as naltrexone may benefit her more because of her extreme cravings.   5. An appt has been scheduled to see myself in the outpatient addiction medicine clinic at [facility] on Monday, Aug 23 at 0845.  6. Discussed with hospitalist that I do not recommend benzodiazepines even in small numbers for severe anxiety and panic.  Discussed with patient other options for mindfulness meditation and relaxation.  She is resistant as in the past she thinks they have not been effective.  We discussed that utilizing benzodiazepines even short-term, put her at higher risk for  relapsing to alcohol.  7. Discussed with patient that she should follow-up with her psychotherapist within the next week  8. Discharge patient on oral thiamine  9. Avoid opiates at discharge for headache     Alcohol withdrawal seizure-reviewed the neurology recommendations which include not adding in antiepileptic which is appropriate.        For questions and concerns, please page the [facility] addiction medicine provider.      Subjective  Patient reports that she is feeling much better and is glad to be going home.  She continues to have anxiety and is wishing to have a small prescription for Xanax or Ativan upon discharge.  She has done DBT in the past and has tried mindfulness meditation exercises numerous times without any success.  She does not think that anything will work for her to reduce her anxiety.    Tolerating p.o. without difficulty  Tremor resolved  Patient is confused by her liver function test worsening despite not drinking in the last few days.  We discussed the reasoning for this    ROS:    No nausea or vomiting  No fever    Objective    Vital signs in last 24 hours  Temp:  [97.6  F (36.4  C)-98  F (36.7  C)] 97.6  F (36.4  C)  Pulse:  [53-72] 56  Resp:  [16] 16  BP: ()/(65-94) 131/94  SpO2:  [98 %-100 %] 100 %    Physical Exam    Notes of previous providers and nursing staff reviewed for the purpose of the this telehealth visit.    Gen: Awake and alert. In no acute distress. Pleasant and cooperative  Neuro: speech is normal  Tremor: none  Eyes: EOMI. There is no scleral icterus.  Skin: no jaundice, diaphoresis, goose bumps  Respiratory: no cough, breathing is non-labored    Psychiatric Mental Status Examination:  Orientation: person, place, date, time  Appearance: The patient appears stated age, in hospital gown  Reliability:  appears to be an adequate historian.    Behavior: makes good eye contact, cooperative and engaged in the interview.   There is no evidence of responding to  "hallucinations or flashbacks.  Speech: spontaneous and coherent, with a normal rate, rhythm and tone.  Associations: connected, intact.  Language:There are no difficulties with expressive or receptive language as observed throughout the interview.    Mood: Described as \" anxious\"    Affect:  Guarded   Judgement: Able to make basic decision regarding safety.  Insight: Good, intact.   Thought process: Logical   Thought content: No evidence of delusions or paranoia.    Fund of knowledge: Average, intact.   Attention / Concentration: Able to remain focused during the interview with minimal distractibility or need for redirection.  Short Term Memory: Intact  Long Term Memory: Intact  Cognitive Function: Intact      Pertinent Labs   Lab Results: I have personally reviewed the labs.  \    Yohana Santos MD  Addiction Medicine                "

## 2021-08-20 ENCOUNTER — PATIENT OUTREACH (OUTPATIENT)
Dept: CARE COORDINATION | Facility: CLINIC | Age: 36
End: 2021-08-20

## 2021-08-20 DIAGNOSIS — Z71.89 OTHER SPECIFIED COUNSELING: ICD-10-CM

## 2021-08-20 LAB
AFP SERPL-MCNC: 5.1 NG/ML
ANA SER QL IF: NEGATIVE
MITOCHONDRIA M2 IGG SER-ACNC: 1.1 U/ML

## 2021-08-20 NOTE — PROGRESS NOTES
Clinic Care Coordination Contact  [facility]/Voicemail       Clinical Data: Care Coordinator Outreach  Outreach attempted x 1.  Left message on patient's voicemail with call back information and requested return call.  Plan: Care Coordinator will try to reach patient again in 1-2 business days.    GLENROY Valdes  [phone]  [facility]

## 2021-08-21 NOTE — PROGRESS NOTES
Clinic Care Coordination Contact  [facility]/Voicemail       Clinical Data: Care Coordinator Outreach  Outreach attempted x 2.  Left message on patient's voicemail with call back information and requested return call.   Care Coordinator will do no further outreaches at this time.      Rachel Mishra

## 2021-09-13 ENCOUNTER — APPOINTMENT (OUTPATIENT)
Dept: CT IMAGING | Facility: HOSPITAL | Age: 36
End: 2021-09-13
Attending: EMERGENCY MEDICINE
Payer: COMMERCIAL

## 2021-09-13 ENCOUNTER — HOSPITAL ENCOUNTER (OUTPATIENT)
Facility: HOSPITAL | Age: 36
Setting detail: OBSERVATION
Discharge: PSYCHIATRIC OR SUBSTANCE USE FACILITY | End: 2021-09-13
Attending: EMERGENCY MEDICINE | Admitting: EMERGENCY MEDICINE
Payer: COMMERCIAL

## 2021-09-13 VITALS
RESPIRATION RATE: 20 BRPM | WEIGHT: 117 LBS | OXYGEN SATURATION: 95 % | BODY MASS INDEX: 20.73 KG/M2 | SYSTOLIC BLOOD PRESSURE: 140 MMHG | HEART RATE: 84 BPM | TEMPERATURE: 97.8 F | DIASTOLIC BLOOD PRESSURE: 97 MMHG

## 2021-09-13 DIAGNOSIS — K70.10 ALCOHOLIC HEPATITIS WITHOUT ASCITES (H): ICD-10-CM

## 2021-09-13 DIAGNOSIS — F41.1 GENERALIZED ANXIETY DISORDER: ICD-10-CM

## 2021-09-13 DIAGNOSIS — F10.20 ALCOHOL USE DISORDER, SEVERE, DEPENDENCE (H): ICD-10-CM

## 2021-09-13 DIAGNOSIS — F10.220 ACUTE ALCOHOLIC INTOXICATION IN ALCOHOLISM WITHOUT COMPLICATION (H): ICD-10-CM

## 2021-09-13 DIAGNOSIS — R25.1 SPELLS OF TREMBLING: ICD-10-CM

## 2021-09-13 DIAGNOSIS — F32.A DEPRESSION, UNSPECIFIED DEPRESSION TYPE: ICD-10-CM

## 2021-09-13 DIAGNOSIS — F10.20 CHRONIC ALCOHOLISM (H): ICD-10-CM

## 2021-09-13 PROBLEM — G40.909 SEIZURE DISORDER (H): Status: ACTIVE | Noted: 2021-09-13

## 2021-09-13 LAB
ANION GAP SERPL CALCULATED.3IONS-SCNC: 17 MMOL/L (ref 5–18)
BUN SERPL-MCNC: 7 MG/DL (ref 8–22)
CALCIUM SERPL-MCNC: 9 MG/DL (ref 8.5–10.5)
CHLORIDE BLD-SCNC: 101 MMOL/L (ref 98–107)
CO2 SERPL-SCNC: 21 MMOL/L (ref 22–31)
CREAT SERPL-MCNC: 0.59 MG/DL (ref 0.6–1.1)
ERYTHROCYTE [DISTWIDTH] IN BLOOD BY AUTOMATED COUNT: 13 % (ref 10–15)
ETHANOL SERPL-MCNC: 312 MG/DL
GFR SERPL CREATININE-BSD FRML MDRD: >90 ML/MIN/1.73M2
GLUCOSE BLD-MCNC: 77 MG/DL (ref 70–125)
HCG SERPL QL: NEGATIVE
HCT VFR BLD AUTO: 36.7 % (ref 35–47)
HGB BLD-MCNC: 12.7 G/DL (ref 11.7–15.7)
MCH RBC QN AUTO: 33.4 PG (ref 26.5–33)
MCHC RBC AUTO-ENTMCNC: 34.6 G/DL (ref 31.5–36.5)
MCV RBC AUTO: 97 FL (ref 78–100)
PLATELET # BLD AUTO: 43 10E3/UL (ref 150–450)
POTASSIUM BLD-SCNC: 4.3 MMOL/L (ref 3.5–5)
RBC # BLD AUTO: 3.8 10E6/UL (ref 3.8–5.2)
SARS-COV-2 RNA RESP QL NAA+PROBE: NEGATIVE
SODIUM SERPL-SCNC: 139 MMOL/L (ref 136–145)
WBC # BLD AUTO: 2.3 10E3/UL (ref 4–11)

## 2021-09-13 PROCEDURE — 72125 CT NECK SPINE W/O DYE: CPT

## 2021-09-13 PROCEDURE — 87635 SARS-COV-2 COVID-19 AMP PRB: CPT | Performed by: EMERGENCY MEDICINE

## 2021-09-13 PROCEDURE — 70450 CT HEAD/BRAIN W/O DYE: CPT

## 2021-09-13 PROCEDURE — 96365 THER/PROPH/DIAG IV INF INIT: CPT

## 2021-09-13 PROCEDURE — 258N000003 HC RX IP 258 OP 636: Performed by: EMERGENCY MEDICINE

## 2021-09-13 PROCEDURE — 96376 TX/PRO/DX INJ SAME DRUG ADON: CPT

## 2021-09-13 PROCEDURE — 82077 ASSAY SPEC XCP UR&BREATH IA: CPT | Performed by: EMERGENCY MEDICINE

## 2021-09-13 PROCEDURE — 250N000009 HC RX 250: Performed by: EMERGENCY MEDICINE

## 2021-09-13 PROCEDURE — 96366 THER/PROPH/DIAG IV INF ADDON: CPT

## 2021-09-13 PROCEDURE — 250N000011 HC RX IP 250 OP 636: Performed by: EMERGENCY MEDICINE

## 2021-09-13 PROCEDURE — 84703 CHORIONIC GONADOTROPIN ASSAY: CPT | Performed by: EMERGENCY MEDICINE

## 2021-09-13 PROCEDURE — 85027 COMPLETE CBC AUTOMATED: CPT | Performed by: EMERGENCY MEDICINE

## 2021-09-13 PROCEDURE — 36415 COLL VENOUS BLD VENIPUNCTURE: CPT | Performed by: EMERGENCY MEDICINE

## 2021-09-13 PROCEDURE — C9803 HOPD COVID-19 SPEC COLLECT: HCPCS

## 2021-09-13 PROCEDURE — 82374 ASSAY BLOOD CARBON DIOXIDE: CPT | Performed by: EMERGENCY MEDICINE

## 2021-09-13 PROCEDURE — 250N000013 HC RX MED GY IP 250 OP 250 PS 637: Performed by: EMERGENCY MEDICINE

## 2021-09-13 PROCEDURE — G0378 HOSPITAL OBSERVATION PER HR: HCPCS

## 2021-09-13 PROCEDURE — 96375 TX/PRO/DX INJ NEW DRUG ADDON: CPT

## 2021-09-13 PROCEDURE — 99285 EMERGENCY DEPT VISIT HI MDM: CPT | Mod: 25

## 2021-09-13 RX ORDER — LEVETIRACETAM 500 MG/1
500 TABLET ORAL 2 TIMES DAILY
Qty: 60 TABLET | Refills: 0 | Status: SHIPPED | OUTPATIENT
Start: 2021-09-13

## 2021-09-13 RX ORDER — HYDROXYZINE HYDROCHLORIDE 50 MG/1
50 TABLET, FILM COATED ORAL EVERY 6 HOURS PRN
Qty: 12 TABLET | Refills: 0 | Status: SHIPPED | OUTPATIENT
Start: 2021-09-13 | End: 2021-09-16

## 2021-09-13 RX ORDER — ONDANSETRON 2 MG/ML
4 INJECTION INTRAMUSCULAR; INTRAVENOUS ONCE
Status: COMPLETED | OUTPATIENT
Start: 2021-09-13 | End: 2021-09-13

## 2021-09-13 RX ORDER — ACAMPROSATE CALCIUM 333 MG/1
666 TABLET, DELAYED RELEASE ORAL 3 TIMES DAILY
Qty: 18 TABLET | Refills: 0 | Status: SHIPPED | OUTPATIENT
Start: 2021-09-13 | End: 2021-09-16

## 2021-09-13 RX ORDER — PANTOPRAZOLE SODIUM 20 MG/1
40 TABLET, DELAYED RELEASE ORAL ONCE
Status: COMPLETED | OUTPATIENT
Start: 2021-09-13 | End: 2021-09-13

## 2021-09-13 RX ORDER — MORPHINE SULFATE 4 MG/ML
4 INJECTION, SOLUTION INTRAMUSCULAR; INTRAVENOUS ONCE
Status: COMPLETED | OUTPATIENT
Start: 2021-09-13 | End: 2021-09-13

## 2021-09-13 RX ORDER — TRAZODONE HYDROCHLORIDE 50 MG/1
50 TABLET, FILM COATED ORAL ONCE
Status: DISCONTINUED | OUTPATIENT
Start: 2021-09-13 | End: 2021-09-13 | Stop reason: HOSPADM

## 2021-09-13 RX ORDER — TRAZODONE HYDROCHLORIDE 50 MG/1
50 TABLET, FILM COATED ORAL
Qty: 3 TABLET | Refills: 0 | Status: SHIPPED | OUTPATIENT
Start: 2021-09-13 | End: 2021-09-16

## 2021-09-13 RX ORDER — GABAPENTIN 300 MG/1
900 CAPSULE ORAL ONCE
Status: COMPLETED | OUTPATIENT
Start: 2021-09-13 | End: 2021-09-13

## 2021-09-13 RX ORDER — SUCRALFATE 1 G/1
1 TABLET ORAL ONCE
Status: COMPLETED | OUTPATIENT
Start: 2021-09-13 | End: 2021-09-13

## 2021-09-13 RX ORDER — GABAPENTIN 300 MG/1
900 CAPSULE ORAL 2 TIMES DAILY
Qty: 18 CAPSULE | Refills: 0 | Status: SHIPPED | OUTPATIENT
Start: 2021-09-13 | End: 2021-09-16

## 2021-09-13 RX ORDER — TETRAHYDROZOLINE HCL 0.05 %
1 DROPS OPHTHALMIC (EYE) 3 TIMES DAILY PRN
COMMUNITY

## 2021-09-13 RX ORDER — DIAZEPAM 5 MG
10 TABLET ORAL ONCE
Status: COMPLETED | OUTPATIENT
Start: 2021-09-13 | End: 2021-09-13

## 2021-09-13 RX ADMIN — LEVETIRACETAM 1000 MG: 100 INJECTION, SOLUTION INTRAVENOUS at 12:44

## 2021-09-13 RX ADMIN — PANTOPRAZOLE SODIUM 40 MG: 20 TABLET, DELAYED RELEASE ORAL at 17:49

## 2021-09-13 RX ADMIN — ONDANSETRON 4 MG: 2 INJECTION INTRAMUSCULAR; INTRAVENOUS at 12:32

## 2021-09-13 RX ADMIN — SUCRALFATE 1 G: 1 TABLET ORAL at 17:49

## 2021-09-13 RX ADMIN — GABAPENTIN 900 MG: 300 CAPSULE ORAL at 19:09

## 2021-09-13 RX ADMIN — MORPHINE SULFATE 4 MG: 4 INJECTION INTRAVENOUS at 12:32

## 2021-09-13 RX ADMIN — LIDOCAINE HYDROCHLORIDE 30 ML: 20 SOLUTION ORAL; TOPICAL at 16:29

## 2021-09-13 RX ADMIN — DIAZEPAM 10 MG: 5 TABLET ORAL at 17:49

## 2021-09-13 RX ADMIN — MORPHINE SULFATE 4 MG: 4 INJECTION INTRAVENOUS at 15:22

## 2021-09-13 ASSESSMENT — ENCOUNTER SYMPTOMS
NECK PAIN: 1
HEADACHES: 1
BACK PAIN: 0
ROS GI COMMENTS: NEGATIVE FOR LOSS OF BOWEL CONTROL.
ABDOMINAL PAIN: 0

## 2021-09-13 NOTE — ED PROVIDER NOTES
ED SIGNOUT  Date/Time:9/13/2021 2:20 PM    Patient signed out to me by my colleague, Vito Regalado MD. Please see their note for complete history and physical. Plan to follow up on Ntele bed placement.     The creation of this record is based on the scribe s observations of the work being performed by Diamond Del Rio MD and the provider s statements to them. It was created on their behalf by Skyler Winkler, a trained medical scribe. This document has been checked and approved by the attending provider.      REMAINING ED WORKUP:    Vitals:  /83   Pulse 86   Temp 97.8  F (36.6  C) (Tympanic)   Resp 20   Wt 53.1 kg (117 lb)   LMP 09/06/2021   SpO2 97%   BMI 20.73 kg/m        Pertinent labs results reviewed   Results for orders placed or performed during the hospital encounter of 09/13/21   Head CT w/o contrast    Impression    CONCLUSION:  HEAD CT:  1.  No acute intracranial abnormality.  2.  No acute calvarial fracture or scalp hematoma.    CERVICAL SPINE CT:  1.  No acute fracture or traumatic subluxation of the cervical spine by CT imaging.  2.  No high-grade spinal canal or neural foraminal stenosis.   Cervical spine CT w/o contrast    Impression    CONCLUSION:  HEAD CT:  1.  No acute intracranial abnormality.  2.  No acute calvarial fracture or scalp hematoma.    CERVICAL SPINE CT:  1.  No acute fracture or traumatic subluxation of the cervical spine by CT imaging.  2.  No high-grade spinal canal or neural foraminal stenosis.   HCG QUALitative pregnancy (blood)   Result Value Ref Range    hCG Serum Qualitative Negative Negative   CBC (+ platelets, no diff)   Result Value Ref Range    WBC Count 2.3 (L) 4.0 - 11.0 10e3/uL    RBC Count 3.80 3.80 - 5.20 10e6/uL    Hemoglobin 12.7 11.7 - 15.7 g/dL    Hematocrit 36.7 35.0 - 47.0 %    MCV 97 78 - 100 fL    MCH 33.4 (H) 26.5 - 33.0 pg    MCHC 34.6 31.5 - 36.5 g/dL    RDW 13.0 10.0 - 15.0 %    Platelet Count 43 (LL) 150 - 450 10e3/uL   Basic metabolic panel    Result Value Ref Range    Sodium 139 136 - 145 mmol/L    Potassium 4.3 3.5 - 5.0 mmol/L    Chloride 101 98 - 107 mmol/L    Carbon Dioxide (CO2) 21 (L) 22 - 31 mmol/L    Anion Gap 17 5 - 18 mmol/L    Urea Nitrogen 7 (L) 8 - 22 mg/dL    Creatinine 0.59 (L) 0.60 - 1.10 mg/dL    Calcium 9.0 8.5 - 10.5 mg/dL    Glucose 77 70 - 125 mg/dL    GFR Estimate >90 >60 mL/min/1.73m2   Alcohol level blood   Result Value Ref Range    Alcohol, Blood 312 (H) None detected mg/dL   Asymptomatic COVID-19 Virus (Coronavirus) by PCR Nasopharyngeal    Specimen: Nasopharyngeal; Swab   Result Value Ref Range    SARS CoV2 PCR Negative Negative       Pertinent imaging reviewed   Please see official radiology report.  Cervical spine CT w/o contrast   Final Result   CONCLUSION:   HEAD CT:   1.  No acute intracranial abnormality.   2.  No acute calvarial fracture or scalp hematoma.      CERVICAL SPINE CT:   1.  No acute fracture or traumatic subluxation of the cervical spine by CT imaging.   2.  No high-grade spinal canal or neural foraminal stenosis.      Head CT w/o contrast   Final Result   CONCLUSION:   HEAD CT:   1.  No acute intracranial abnormality.   2.  No acute calvarial fracture or scalp hematoma.      CERVICAL SPINE CT:   1.  No acute fracture or traumatic subluxation of the cervical spine by CT imaging.   2.  No high-grade spinal canal or neural foraminal stenosis.           Interventions  Medications   diazepam (VALIUM) tablet 10 mg (has no administration in time range)   sucralfate (CARAFATE) tablet 1 g (has no administration in time range)   pantoprazole (PROTONIX) EC tablet 40 mg (has no administration in time range)   levETIRAcetam (KEPPRA) 1,000 mg in sodium chloride 0.9 % 100 mL intermittent infusion (0 mg Intravenous Stopped 9/13/21 1450)   ondansetron (ZOFRAN) injection 4 mg (4 mg Intravenous Given 9/13/21 1232)   morphine (PF) injection 4 mg (4 mg Intravenous Given 9/13/21 1232)   morphine (PF) injection 4 mg (4 mg  Intravenous Given 9/13/21 1522)   lidocaine (XYLOCAINE) 2 % 15 mL, alum & mag hydroxide-simethicone (MAALOX) 15 mL GI Cocktail (30 mLs Oral Given 9/13/21 1629)        ED Course/MDM:  2:20 PM Signout accepted from Vito Regalado MD.  Prior records were reviewed.  Diagnostics from this visit are reviewed.  2:21 PM I introduced myself to the patient. Plan to board until bed assignment reviewed with patient.  4:50 PM I spoke to [facility] neurologist Dr. García. Patient does not meet admission criteria.  4:52 PM I updated patient with plan. From what I can gather patient has self-reported possible seizure at home 2 weeks ago and again today. She is heavily intoxicated. Unclear whether these are genuine seizures or not. She is already been started on antiepileptic. Our ER physician and neurologist initially felt like she was safe for discharge to home. Neurology at [facility] also does not think patient warrants admission and they to have very scarce limited beds and do not accept her. There are no neuro telemetry beds in the [facility]. Patient feels uncomfortable going home because she does not have any friends or family that can be with her tonight. Again she has been very stable and I do think she would be safe to be discharged home. I am happy to oblige calling [facility] and [facility] to see if they would have potential hospital beds for neuro telemetry observation. However thereafter if we run out of options I do think patient does need to be discharged home. We currently have 20 patients in triage that have not yet been evaluated to make sure that they are stable for discharge, and she is. This was explained to patient, and though frustrated with the above she does understand.  5:07 PM [facility] has no Ntele beds. Will try [facility].  5:11 PM [facility] has no Ntele beds.  Patient updated.  Again I think it is irresponsible from a system perspective to keep patient in the emergency department.  She is frustrated because she states that  she does not feel safe going home and is worried about having a withdrawal seizure.  Patient now complaining of some epigastric discomfort.  Given PPI, Carafate.  Worried that she is going to withdraw.  Given oral Valium.  I have offered her to potentially go to detox so that she would feel safe from a detox perspective tonight and she is agreeable to do that.  5:33 PM [facility] detox does not have beds but [facility] detox does have beds and will accept the patient.  Will discharge with 3 days of meds.               1. Chronic alcoholism (H)    2. Acute alcoholic intoxication in alcoholism without complication (H)    3. Spells of trembling    4. Alcohol use disorder, severe, dependence (H)    5. Alcoholic hepatitis without ascites    6. Generalized anxiety disorder    7. Depression, unspecified depression type          Diamond Del Rio MD  [facility] Emergency Department          Diamond Del Rio MD  09/13/21 1734       Diamond Del Rio MD  09/13/21 173

## 2021-09-13 NOTE — PHARMACY-ADMISSION MEDICATION HISTORY
Pharmacy Note - Admission Medication History     ______________________________________________________________________    Prior To Admission (PTA) med list completed and updated in EMR.       PTA Med List   Medication Sig Last Dose     acamprosate (CAMPRAL) 333 MG EC tablet Take 2 tablets (666 mg) by mouth 3 times daily 9/12/2021 at Unknown time     calcium carbonate (TUMS) 500 MG chewable tablet Take 1 tablet (500 mg) by mouth daily as needed for heartburn Unknown at Unknown time     gabapentin (NEURONTIN) 300 MG capsule Take 3 capsules (900 mg) by mouth 3 times daily (Patient taking differently: Take 900 mg by mouth 2 times daily ) 9/12/2021 at Unknown time     hydrOXYzine (ATARAX) 50 MG tablet Take 1 tablet (50 mg) by mouth every 6 hours as needed for anxiety Past Week at Unknown time     levETIRAcetam (KEPPRA) 500 MG tablet Take 1 tablet (500 mg) by mouth 2 times daily      melatonin 5 MG tablet Take 1 tablet (5 mg) by mouth every evening as needed for sleep Past Week at Unknown time     rizatriptan (MAXALT-MLT) 10 MG ODT Take 10 mg by mouth every 2 hours as needed for migraine Unknown at Unknown time     tetrahydrozoline (VISINE) 0.05 % ophthalmic solution Place 1 drop into both eyes 3 times daily as needed Unknown at Unknown time     thiamine (B-1) 100 MG tablet Take 1 tablet (100 mg) by mouth daily 9/12/2021 at Unknown time     traZODone (DESYREL) 50 MG tablet Take 1 tablet (50 mg) by mouth nightly as needed for sleep Past Week at Unknown time       Information source(s): Patient and CareEverywhere/[facility]  Method of interview communication: in-person    Summary of Changes to PTA Med List  New: Visine  Discontinued: Maalox, famotidine, folic acid, MVI, Miralax,  Changed: gabapentin    Patient was asked about OTC/herbal products specifically.  PTA med list reflects this.    In the past week, patient estimated taking medication this percent of the time:  greater than 90%.    Allergies were reviewed,  assessed, and updated with the patient.      Patient does not anticipate needing any multi-use medications during admission.    The information provided in this note is only as accurate as the sources available at the time of the update(s).    Thank you for the opportunity to participate in the care of this patient.    Madhuri Guzman [facility]  9/13/2021 2:37 PM

## 2021-09-13 NOTE — ED PROVIDER NOTES
EMERGENCY DEPARTMENT ENCOUNTER      NAME: Fabiola Barrera  AGE: 36 year old female  YOB: 1985  MRN: [id number]  EVALUATION DATE & TIME: 2021 11:23 AM    PCP: Yohana Garcia    ED PROVIDER: Vito Regalado M.D.      Chief Complaint   Patient presents with     Seizures       FINAL IMPRESSION:  1.  Chronic alcoholism.  2.  Recurrent seizure.  3.  Alcohol intoxication.    ED COURSE & MEDICAL DECISION MAKIN:34 AM I met with the patient to gather history and to perform my initial exam. We discussed plans for the ED course, including diagnostic testing and treatment. PPE worn: cloth mask  Pertinent Labs & Imaging studies reviewed. (See chart for details)  36 year old female presents to the Emergency Department for evaluation of possible seizure.  Patient notes a seizure 2 weeks ago.  She had another seizure today.  She notes that she bit her tongue lightly but not bleeding.  She denies any loss of bowel or bladder control.  Patient did fall and hit the back of her head.  She is uncertain how long she was out of it.  She denies any neurological deficits at this time.  Patient is complaining of right-sided headache and some neck pain.  A cervical collar is placed.  Normal blood pressure and vital signs at this time.  No evidence for significant alcohol withdrawal at this time.  No hallucinations or other symptoms.  Seizure may have been irregular seizure or alcohol withdrawal seizure.  1:50 PM.  Head CT and cervical spine CT are negative.  Cervical collar was removed.  Chemistries unremarkable and bicarbonate of 21.  CBC with normal hemoglobin.  White count 2.3 and platelets 43,000.  Patient has a history of thrombocytopenia.  This is probably secondary to her drinking.  hCG and Covid testing were negative.  Blood alcohol is 0.312 despite patient saying she last drank yesterday.  She has a normal heart rate and blood pressure 128/96 and 81.  Given normal heart rate and blood pressure as well as  alcohol level this high I suspect that her seizure (if it was a seizure) represents a regular seizure rather than a withdrawal seizure.  We will page neurology and discussed this with them if they think she needs to be admitted or if they think she can go home.  Patient was loaded with Keppra.  Patient aware the findings and the plan so far.  2 PM.  I spoke with the neurologist on-call.  He also wonders whether this was possibly a regular seizure rather than a withdrawal seizure.  He agrees with me that given her normal heart rate and blood pressure and alcohol level over 300 the withdrawal seizure is less likely.  He agrees with starting her on Keppra with this being the second possible seizure.  He thinks the patient follow-up with her family doctor and/or neurology referral as well as referral for drug and alcohol treatment programs.  This was communicated to the patient.  I wrote a prescription for Keppra..  Patient will need a ride home.  2:15 PM.  I acquainted the patient with the neurology recommendation that she be discharged home on Keppra.  Patient notes that she is scared of going home and is refusing to go home at this time.  Accordingly the unit coordinator is looking for a neuro telemetry bed here or elsewhere in the [place].  Pending those results patient may need admission, transfer, or being placed on boarding status.  Patient signed out to the afternoon ED physician.    Critical care time exclusive of procedures: 30 minutes.    At the conclusion of the encounter I discussed the results of all of the tests and the disposition. The questions were answered. The patient or family acknowledged understanding and was agreeable with the care plan.       MEDICATIONS GIVEN IN THE EMERGENCY:  Medications   levETIRAcetam (KEPPRA) 1,000 mg in sodium chloride 0.9 % 100 mL intermittent infusion (1,000 mg Intravenous New Bag 9/13/21 1244)   ondansetron (ZOFRAN) injection 4 mg (4 mg Intravenous Given 9/13/21  "1232)   morphine (PF) injection 4 mg (4 mg Intravenous Given 9/13/21 1232)       NEW PRESCRIPTIONS STARTED AT TODAY'S ER VISIT  New Prescriptions    No medications on file     =================================================================    HPI    Patient information was obtained from: Patient    Use of : N/A        Fabiola Barrera is a 36 year old female with a pertinent history of severe alcohol use disorder, alcohol-induced pancreatitis, alcoholic hepatitis, alcohol withdrawal seizure, hypertension, and migraine, who presents to this ED by EMS for evaluation of suspected seizure.    Per chart review, patient was seen here at [facility] ED on 8/15/21 for evaluation of a seizure. Head CT was negative for acute cranial pathology. EKG was normal. Labs showed mild hyponatremia, hypomagnesemia, and pancytopenia, which were suspected to be related to alcohol dependence. She was admitted to the hospital for further evaluation. Brain MRI and EEG showed no acute findings. She was evaluated by neurology. She was discharged on 8/19/21 in stable condition with thiamine and gabapentin.    This morning prior to arrival to the ED, patient reports that she thinks she had a seizure that caused her to fall and hit the back of her head. States she thinks lost consciousness for about 10 minutes, but notes this episode was unwitnessed. Mentions she mildly bit her tongue, and endorses a headache and neck pain. She presents by EMS wearing a C-collar. She denies any loss of bowel or bladder control. She states she had a seizure about two weeks ago and came here for evaluation. See chart review.    She states that she drinks alcohol \"occasionally.\" Says she has a few drinks several times per week. Last reported drink was last night. States she is working on quitting smoking.     She denies any abdominal pain, back pian, or double vision. Denies any chance of pregnancy. Does not identify any waxing or waning symptoms " otherwise, exacerbating or alleviating features,associated symptoms except as mentioned. Denies any pain related complaints.    REVIEW OF SYSTEMS   Review of Systems   HENT:        Positive for head impact during fall.   Eyes: Negative for visual disturbance.   Gastrointestinal: Negative for abdominal pain.        Negative for loss of bowel control.   Genitourinary:        Negative for loss of bladder control.   Musculoskeletal: Positive for neck pain. Negative for back pain.   Neurological: Positive for headaches.   All other systems reviewed and are negative.      PAST MEDICAL HISTORY:  Past Medical History:   Diagnosis Date     Alcohol use disorder, severe, dependence (H) 2021     Depression      Depression      Migraine      Migraine      Pancreatitis      Pancreatitis        PAST SURGICAL HISTORY:  Past Surgical History:   Procedure Laterality Date      SECTION      x2       CURRENT MEDICATIONS:    acamprosate (CAMPRAL) 333 MG EC tablet  alum & mag hydroxide-simethicone (MAALOX) 200-200-20 MG/5ML SUSP suspension  calcium carbonate (TUMS) 500 MG chewable tablet  famotidine (PEPCID) 40 MG tablet  folic acid (FOLVITE) 1 MG tablet  gabapentin (NEURONTIN) 300 MG capsule  hydrOXYzine (ATARAX) 50 MG tablet  melatonin 5 MG tablet  multivitamin w/minerals (THERA-VIT-M) tablet  polyethylene glycol (MIRALAX) 17 GM/Dose powder  rizatriptan (MAXALT-MLT) 10 MG ODT  thiamine (B-1) 100 MG tablet  traZODone (DESYREL) 50 MG tablet      ALLERGIES:  Allergies   Allergen Reactions     Phenazopyridine Anaphylaxis     Oxycodone-Acetaminophen Rash     Tolerates hydrocodone-APAP       FAMILY HISTORY:  Family History   Problem Relation Age of Onset     Nephrolithiasis Maternal Grandfather        SOCIAL HISTORY:   Social History     Socioeconomic History     Marital status:      Spouse name: None     Number of children: None     Years of education: None     Highest education level: None   Occupational History      None   Tobacco Use     Smoking status: Current Some Day Smoker     Smokeless tobacco: Never Used     Tobacco comment: 1 cigarette every other week   Substance and Sexual Activity     Alcohol use: Yes     Alcohol/week: 0.0 - 10.0 standard drinks     Comment: Alcoholic Drinks/day: varies from none to several drinks a day, drinks bi weekly     Drug use: Never     Sexual activity: None   Other Topics Concern     None   Social History Narrative     None     Social Determinants of Health     Financial Resource Strain:      Difficulty of Paying Living Expenses:    Food Insecurity:      Worried About Running Out of Food in the Last Year:      Ran Out of Food in the Last Year:    Transportation Needs:      Lack of Transportation (Medical):      Lack of Transportation (Non-Medical):    Physical Activity:      Days of Exercise per Week:      Minutes of Exercise per Session:    Stress:      Feeling of Stress :    Social Connections:      Frequency of Communication with Friends and Family:      Frequency of Social Gatherings with Friends and Family:      Attends [employer] Services:      Active Member of Clubs or Organizations:      Attends Club or Organization Meetings:      Marital Status:    Intimate Partner Violence:      Fear of Current or Ex-Partner:      Emotionally Abused:      Physically Abused:      Sexually Abused:    Tobacco and alcohol abuse.  Denies drugs.    VITALS:  /83   Pulse 79   Temp 97.8  F (36.6  C) (Tympanic)   Resp 20   Wt 53.1 kg (117 lb)   LMP 09/06/2021   SpO2 94%   BMI 20.73 kg/m      PHYSICAL EXAM    Vital Signs:  /83   Pulse 79   Temp 97.8  F (36.6  C) (Tympanic)   Resp 20   Wt 53.1 kg (117 lb)   LMP 09/06/2021   SpO2 94%   BMI 20.73 kg/m    General:  On entering the room she is in no apparent distress.    Neck:  Neck supple with full range of motion and nontender.    Back:  Back and spine are nontender.  No costovertebral angle tenderness.    HEENT:  Oropharynx clear with  moist mucous membranes.  HEENT unremarkable.  Small lateral tongue bite.  Pulmonary:  Chest clear to auscultation without rhonchi rales or wheezing.    Cardiovascular:  Cardiac regular rate and rhythm without murmurs rubs or gallops.    Abdomen:  Abdomen soft nontender.  There is no rebound or guarding.    Muskuloskeletal:  she moves all 4 without any difficulty and has normal neurovascular exams.  Extremities without clubbing, cyanosis, or edema.  Legs and calves are nontender.    Neuro:  she is alert and oriented ×3 and moves all extremities symmetrically.  Pupils equal reactive to light.  Extraocular limbs intact.  Trace horizontal nystagmus bilaterally.  Cranial nerves II through XII intact equal bilaterally.  Strength 5/5 in all 4 extremities.  Sensation intact in all 4 extremities.  Knee deep tendon reflexes 1/3 bilaterally.  Cerebellar function by finger-to-nose testing good and equal bilaterally.  Psych:  Normal affect.    Skin:  Unremarkable and warm and dry.       LAB:  All pertinent labs reviewed and interpreted.  Labs Ordered and Resulted from Time of ED Arrival Up to the Time of Departure from the ED   CBC WITH PLATELETS - Abnormal; Notable for the following components:       Result Value    WBC Count 2.3 (*)     MCH 33.4 (*)     Platelet Count 43 (*)     All other components within normal limits   BASIC METABOLIC PANEL - Abnormal; Notable for the following components:    Carbon Dioxide (CO2) 21 (*)     Urea Nitrogen 7 (*)     Creatinine 0.59 (*)     All other components within normal limits   ETHYL ALCOHOL LEVEL - Abnormal; Notable for the following components:    Alcohol, Blood 312 (*)     All other components within normal limits   HCG QUALITATIVE PREGNANCY - Normal   COVID-19 VIRUS (CORONAVIRUS) BY PCR - Normal    Narrative:     Testing was performed using the [employer]abila  SARS-CoV-2 & Influenza A/B Assay on the nabila  Sumi  System.  This test should be ordered for the detection of SARS-COV-2 in  individuals who meet SARS-CoV-2 clinical and/or epidemiological criteria. Test performance is unknown in asymptomatic patients.  This test is for in vitro diagnostic use under the FDA EUA for laboratories certified under CLIA to perform moderate and/or high complexity testing. This test has not been FDA cleared or approved.  A negative test does not rule out the presence of PCR inhibitors in the specimen or target RNA in concentration below the limit of detection for the assay. The possibility of a false negative should be considered if the patient's recent exposure or clinical presentation suggests COVID-19.  [facility] are certified under the Clinical Laboratory Improvement Amendments of 1988 (CLIA-88) as qualified to perform moderate and/or high complexity laboratory testing.   MAY SALINE LOCK IV   CALL       RADIOLOGY:  Reviewed all pertinent imaging. Please see official radiology report.  Cervical spine CT w/o contrast   Final Result   CONCLUSION:   HEAD CT:   1.  No acute intracranial abnormality.   2.  No acute calvarial fracture or scalp hematoma.      CERVICAL SPINE CT:   1.  No acute fracture or traumatic subluxation of the cervical spine by CT imaging.   2.  No high-grade spinal canal or neural foraminal stenosis.      Head CT w/o contrast   Final Result   CONCLUSION:   HEAD CT:   1.  No acute intracranial abnormality.   2.  No acute calvarial fracture or scalp hematoma.      CERVICAL SPINE CT:   1.  No acute fracture or traumatic subluxation of the cervical spine by CT imaging.   2.  No high-grade spinal canal or neural foraminal stenosis.          PROCEDURES:   None.    I, Viral Mancera, am serving as a scribe to document services personally performed by Dr. Regalado based on my observation and the provider's statements to me. I, Vito Regalado MD attest that Viral Mancera is acting in a scribe capacity, has observed my performance of the services and has documented them in accordance  with my direction.    Vito Regalado M.D.  Emergency Medicine  [facility] EMERGENCY DEPARTMENT  [facility] [facility] [id number] [phone]  Dept: [phone]     Vito Regalado MD  09/13/21 1409       Vito Regalado MD  09/13/21 5741

## 2021-09-13 NOTE — DISCHARGE INSTRUCTIONS
Encourage fluids.  Keppra 500 mg twice a day (1 in the morning and 1 in the evening).  Avoid sleep deprivation.  Follow-up with your doctor this week.  They may wish to refer you to neurology for follow-up.  Contact [facility] at [phone].  Meet with them to obtain approval for drug and alcohol treatment programs.  They will give you referrals to various treatment centers that you should then follow-up with.  See your doctor or return if worse or problems or repeat seizure.    Blood alcohol level 0.312 today.  You must have a sober  to take you home.

## 2021-09-13 NOTE — ED NOTES
Pt thinks she had a seizure. Fell to ground and unresponsive for about 10 min. Pt a/o cabrera, no loss of b/b. C/o headache to right posterior head.

## 2021-09-13 NOTE — ED TRIAGE NOTES
Arrival EMS from home pt believes she  had seizure. Was standing fell to ground hitting back of head. Was unresponsive for about 10 min. Then able to call ems. Had a seizure about 2 weeks ago, unknown cause. C-collar in place by EMS. Denies thinners.

## 2021-09-18 ENCOUNTER — HOSPITAL ENCOUNTER (EMERGENCY)
Facility: CLINIC | Age: 36
Discharge: HOME | End: 2021-09-18
Attending: EMERGENCY MEDICINE | Admitting: EMERGENCY MEDICINE
Payer: COMMERCIAL

## 2021-09-18 VITALS
HEART RATE: 98 BPM | OXYGEN SATURATION: 98 % | RESPIRATION RATE: 20 BRPM | SYSTOLIC BLOOD PRESSURE: 120 MMHG | TEMPERATURE: 97.4 F | DIASTOLIC BLOOD PRESSURE: 91 MMHG

## 2021-09-18 DIAGNOSIS — F10.10 ALCOHOL ABUSE: ICD-10-CM

## 2021-09-18 DIAGNOSIS — R51.9 ACUTE NONINTRACTABLE HEADACHE, UNSPECIFIED HEADACHE TYPE: ICD-10-CM

## 2021-09-18 LAB
ANION GAP SERPL CALCULATED.3IONS-SCNC: 7 MMOL/L (ref 3–14)
BASOPHILS # BLD AUTO: 0 10E3/UL (ref 0–0.2)
BASOPHILS NFR BLD AUTO: 0 %
BUN SERPL-MCNC: 5 MG/DL (ref 7–30)
CALCIUM SERPL-MCNC: 8.7 MG/DL (ref 8.5–10.1)
CHLORIDE BLD-SCNC: 111 MMOL/L (ref 94–109)
CO2 SERPL-SCNC: 26 MMOL/L (ref 20–32)
CREAT SERPL-MCNC: 0.53 MG/DL (ref 0.52–1.04)
EOSINOPHIL # BLD AUTO: 0 10E3/UL (ref 0–0.7)
EOSINOPHIL NFR BLD AUTO: 1 %
ERYTHROCYTE [DISTWIDTH] IN BLOOD BY AUTOMATED COUNT: 12.9 % (ref 10–15)
ETHANOL SERPL-MCNC: 0.33 G/DL
GFR SERPL CREATININE-BSD FRML MDRD: >90 ML/MIN/1.73M2
GLUCOSE BLD-MCNC: 85 MG/DL (ref 70–99)
HCT VFR BLD AUTO: 35.1 % (ref 35–47)
HGB BLD-MCNC: 11.7 G/DL (ref 11.7–15.7)
HOLD SPECIMEN: NORMAL
IMM GRANULOCYTES # BLD: 0 10E3/UL
IMM GRANULOCYTES NFR BLD: 0 %
LIPASE SERPL-CCNC: 112 U/L (ref 73–393)
LYMPHOCYTES # BLD AUTO: 1.4 10E3/UL (ref 0.8–5.3)
LYMPHOCYTES NFR BLD AUTO: 42 %
MCH RBC QN AUTO: 32.7 PG (ref 26.5–33)
MCHC RBC AUTO-ENTMCNC: 33.3 G/DL (ref 31.5–36.5)
MCV RBC AUTO: 98 FL (ref 78–100)
MONOCYTES # BLD AUTO: 0.4 10E3/UL (ref 0–1.3)
MONOCYTES NFR BLD AUTO: 11 %
NEUTROPHILS # BLD AUTO: 1.6 10E3/UL (ref 1.6–8.3)
NEUTROPHILS NFR BLD AUTO: 46 %
NRBC # BLD AUTO: 0 10E3/UL
NRBC BLD AUTO-RTO: 0 /100
PLATELET # BLD AUTO: 96 10E3/UL (ref 150–450)
POTASSIUM BLD-SCNC: 4 MMOL/L (ref 3.4–5.3)
RBC # BLD AUTO: 3.58 10E6/UL (ref 3.8–5.2)
SODIUM SERPL-SCNC: 144 MMOL/L (ref 133–144)
WBC # BLD AUTO: 3.3 10E3/UL (ref 4–11)

## 2021-09-18 PROCEDURE — 85025 COMPLETE CBC W/AUTO DIFF WBC: CPT | Performed by: EMERGENCY MEDICINE

## 2021-09-18 PROCEDURE — 250N000011 HC RX IP 250 OP 636: Performed by: EMERGENCY MEDICINE

## 2021-09-18 PROCEDURE — 96361 HYDRATE IV INFUSION ADD-ON: CPT

## 2021-09-18 PROCEDURE — 258N000003 HC RX IP 258 OP 636: Performed by: EMERGENCY MEDICINE

## 2021-09-18 PROCEDURE — 80177 DRUG SCRN QUAN LEVETIRACETAM: CPT | Performed by: EMERGENCY MEDICINE

## 2021-09-18 PROCEDURE — 96374 THER/PROPH/DIAG INJ IV PUSH: CPT

## 2021-09-18 PROCEDURE — 82077 ASSAY SPEC XCP UR&BREATH IA: CPT | Performed by: EMERGENCY MEDICINE

## 2021-09-18 PROCEDURE — 96375 TX/PRO/DX INJ NEW DRUG ADDON: CPT

## 2021-09-18 PROCEDURE — 80048 BASIC METABOLIC PNL TOTAL CA: CPT | Performed by: EMERGENCY MEDICINE

## 2021-09-18 PROCEDURE — 83690 ASSAY OF LIPASE: CPT | Performed by: EMERGENCY MEDICINE

## 2021-09-18 PROCEDURE — 99285 EMERGENCY DEPT VISIT HI MDM: CPT | Mod: 25

## 2021-09-18 PROCEDURE — 36415 COLL VENOUS BLD VENIPUNCTURE: CPT | Performed by: EMERGENCY MEDICINE

## 2021-09-18 RX ORDER — ONDANSETRON 2 MG/ML
4 INJECTION INTRAMUSCULAR; INTRAVENOUS ONCE
Status: COMPLETED | OUTPATIENT
Start: 2021-09-18 | End: 2021-09-18

## 2021-09-18 RX ORDER — ACETAMINOPHEN 325 MG/1
325 TABLET ORAL ONCE
Status: DISCONTINUED | OUTPATIENT
Start: 2021-09-18 | End: 2021-09-18 | Stop reason: HOSPADM

## 2021-09-18 RX ORDER — LORAZEPAM 2 MG/ML
0.5 INJECTION INTRAMUSCULAR ONCE
Status: COMPLETED | OUTPATIENT
Start: 2021-09-18 | End: 2021-09-18

## 2021-09-18 RX ORDER — LIDOCAINE 40 MG/G
CREAM TOPICAL
Status: DISCONTINUED | OUTPATIENT
Start: 2021-09-18 | End: 2021-09-18 | Stop reason: HOSPADM

## 2021-09-18 RX ORDER — KETOROLAC TROMETHAMINE 15 MG/ML
15 INJECTION, SOLUTION INTRAMUSCULAR; INTRAVENOUS ONCE
Status: COMPLETED | OUTPATIENT
Start: 2021-09-18 | End: 2021-09-18

## 2021-09-18 RX ADMIN — SODIUM CHLORIDE 1000 ML: 9 INJECTION, SOLUTION INTRAVENOUS at 02:56

## 2021-09-18 RX ADMIN — KETOROLAC TROMETHAMINE 15 MG: 15 INJECTION, SOLUTION INTRAMUSCULAR; INTRAVENOUS at 03:17

## 2021-09-18 RX ADMIN — ONDANSETRON 4 MG: 2 INJECTION INTRAMUSCULAR; INTRAVENOUS at 02:55

## 2021-09-18 RX ADMIN — LORAZEPAM 0.5 MG: 2 INJECTION INTRAMUSCULAR; INTRAVENOUS at 03:16

## 2021-09-18 ASSESSMENT — ENCOUNTER SYMPTOMS
NAUSEA: 1
ABDOMINAL PAIN: 1
COUGH: 0
HEADACHES: 1
FEVER: 0

## 2021-09-18 NOTE — ED TRIAGE NOTES
Pt aox4, ABCs intact. Pt c/o alcohol withdrawal symptoms. Pt states that she had her last drink around 2300 tonight. Pt now feeling shaky, nauseated and has a headache. Hx of withdrawal seizures, last seizure 3 weeks ago.

## 2021-09-18 NOTE — ED PROVIDER NOTES
History     Chief Complaint:  Withdrawal    The history is provided by the patient.      Fabiola Barrera is a 36 year old female with a history of alcohol abuse with alcohol withdrawal seizure, pancreatitis, alcoholic hepatitis and hypertension who presents with withdrawal. The patient states that she is in the process of getting into rehab for alcohol abuse. The patient reports her last drink at 2300 and had about 2 bottles of wine. She states that since then she has started to experience headache, shakiness, RUQ abdominal pain and naseua The She reports a history of seizure and she is concerned about having another seizure. She denies any fever, cough. She denies any chance of pregnancy.     Review of Systems   Constitutional: Negative for fever.   Respiratory: Negative for cough.    Gastrointestinal: Positive for abdominal pain and nausea.   Neurological: Positive for headaches.        Shakiness   All other systems reviewed and are negative.        Allergies:  Phenazopyridine  Oxycodone-Acetaminophen      Medications:    Gabapentin   Keppra   Rizatriptan   Trazodone  Zoloft   Atarax   Xanax     Past Medical History:    Alcohol abuse   Depression   Pancreatitis   Seizure disorder   Hypertension   Anxiety   Alcoholic hepatitis      Past Surgical History:    C section X2   D&C     Family History:    Alcohol abuse- brother       Social History:  Alcohol abuse  Current everyday smoker     Physical Exam     Patient Vitals for the past 24 hrs:   BP Temp Temp src Pulse Resp SpO2   09/18/21 0445 (!) 106/90 -- -- 120 -- 97 %   09/18/21 0440 -- -- -- -- -- 97 %   09/18/21 0435 -- -- -- -- -- 97 %   09/18/21 0430 -- -- -- -- -- 97 %   09/18/21 0425 -- -- -- -- -- 97 %   09/18/21 0420 -- -- -- -- -- 99 %   09/18/21 0415 (!) 130/97 -- -- 119 -- 98 %   09/18/21 0410 -- -- -- -- -- 99 %   09/18/21 0405 -- -- -- -- -- 100 %   09/18/21 0400 (!) 161/104 -- -- (!) 121 -- 97 %   09/18/21 0355 -- -- -- -- -- 95 %   09/18/21 0350 --  -- -- -- -- 99 %   09/18/21 0345 (!) 153/119 -- -- 116 -- 100 %   09/18/21 0340 -- -- -- -- -- 99 %   09/18/21 0335 -- -- -- -- -- 99 %   09/18/21 0330 (!) 96/90 -- -- 110 -- 100 %   09/18/21 0325 -- -- -- -- -- 99 %   09/18/21 0320 -- -- -- -- -- 97 %   09/18/21 0315 (!) 119/90 -- -- 103 -- 97 %   09/18/21 0310 -- -- -- -- -- 98 %   09/18/21 0305 -- -- -- -- -- 99 %   09/18/21 0300 (!) 125/95 -- -- 102 -- 98 %   09/18/21 0255 -- -- -- -- -- 97 %   09/18/21 0250 -- -- -- -- -- 98 %   09/18/21 0245 (!) 121/94 -- -- 109 -- 98 %   09/18/21 0244 -- 97.4  F (36.3  C) -- -- -- --   09/18/21 0243 (!) 130/100 -- Oral 108 20 98 %   09/18/21 0240 (!) 130/100 -- -- 116 -- 97 %       Physical Exam  General: The patient is alert, in no respiratory distress.    HENT: Masked     Cardiovascular: Regular rate and rhythm. Good pulses in all four extremities. Normal capillary refill and skin turgor.     Respiratory: Lungs are clear. No nasal flaring. No retractions. No wheezing, no crackles.    Gastrointestinal: Abdomen soft. No guarding, no rebound. No palpable hernias.     Musculoskeletal: No gross deformity.     Skin: No rashes or petechiae.     Neurologic: The patient is alert and oriented x3. GCS 15. No testable cranial nerve deficit. Follows commands with clear and appropriate speech. Gives appropriate answers. Good strength in all extremities. No gross neurologic deficit. Gross sensation intact. Pupils are round and reactive. No meningismus. Intentional tremor     Lymphatic: No cervical adenopathy. No lower extremity swelling.    Psychiatric: The patient is non-tearful.    Emergency Department Course     Laboratory:    BMP: Cl 111 (H) urea nitrogen 5 (L) o/w WNL (Creatinine 0.53)     Keppra level: pending     Alcohol ethyl: 0.33 (HH)     CBC: WBC 3.3(L), HGB 11.7, PLT 96(L)     Lipase: 112     Procedures:    Emergency Department Course:    Reviewed:  I reviewed nursing notes, vitals, past history and care  everywhere    Assessments:  0257 I obtained history and examined the patient as noted above.     0505 I rechecked the patient and explained findings.     Interventions:  0255 Zofran 4 mg IV     0256 NS, 1 L, IV    0316 Ativan 0.5 mg IV     0317 Toradol 15 mg IV     Disposition:  The patient was signed out to my oncoming partner waiting either sobriety or a ride    Impression & Plan        Medical Decision Making:  The patient presents complaining of concern for withdrawal symptoms however in checking her alcohol level it is 0.33.  I did review her most recent ER visit at which point she had a seizure despite a high alcohol level.  Per the records at that time it was felt that he may she may have a seizure disorder and on Keppra I did check a Keppra level here with that level is still pending.  The patient hears complain of mild headache after both with Toradol and Tylenol but I felt likely is secondary to heavy use of alcohol.  Her tremor is only intentional otherwise she is quite calm and not hypertensive.  The patient was treated with Ativan before and her alcohol level.  At this point discussed her going to detox but she says she is out arranging outpatient treatment at [facility] and wants to go home with a friend who can stay with her.  The patient was signed out pending either sobriety or a sober ride.    Diagnosis:    ICD-10-CM    1. Alcohol abuse  F10.10    2. Acute nonintractable headache, unspecified headache type  R51.9        Discharge Medications:  New Prescriptions    No medications on file       Scribe Disclosure:  I, Jasmin Baron, am serving as a scribe at 2:48 AM on 9/18/2021 to document services personally performed by Arsh Alicia MD based on my observations and the provider's statements to me.      Arsh Alicia MD  09/18/21 0600

## 2021-09-18 NOTE — ED NOTES
Patients significant other here for discharge. Pt given discharge instructions, verbalized understanding. Pt steady on her feet.

## 2021-09-18 NOTE — ED NOTES
Patient requesting to go home, pt states that she will get a lyft. Patient educated that she needs a sober ride home that will make sure she gets inside her home due to the level of her intoxication. MD Alicia notified.

## 2021-09-18 NOTE — DISCHARGE INSTRUCTIONS
Discharge Instructions  Alcohol Intoxication    You have been seen today with alcohol intoxication. This means that you have enough alcohol in your system to impair your ability to mentally and physically function. When you are intoxicated, we are not allowed to release you without a sober adult to be with you. You may not drive, operate dangerous equipment, or do anything else dangerous until you are sober.    You may have come to the Emergency Department because of your intoxication, or for another reason, such as because of an injury. No matter what the case is, this visit is a  red flag  regarding alcohol use, and you should consider whether your drinking pattern is a problem for you.     You may be at risk for alcohol-related problems if:    Men: you drink more than 14 drinks per week, or more than 4 drinks per occasion.    Women: you drink more than 7 drinks per week or more than 3 drinks per occasion.    You have black-outs.  You do things you regret while drinking.  You have legal problems because of drinking (DUI).  You have job problems because of drinking (you call in sick to work because of drinking).    CAGE Questions  Have you ever felt you should cut down on your drinking?  Have people annoyed you by criticizing your drinking?  Have you ever felt bad or guilty about your drinking?  Have you ever had a drink first thing in the morning to steady your nerves or get rid of a hangover (eye opener)?    If you answer yes to any of the CAGE questions, you may have a problem with alcohol.      Return to the Emergency Department if:  You become shaky or tremble when you try to stop drinking.    You have a seizure or pass out.    You throw up (vomit) blood. This may be bright red or it may look like black coffee grounds.    You have blood in the stool. This may be bright red or appear as a black, tarry, bad smelling stool.    You become lightheaded or faint.      For further help, contact:   Your caregiver.     Alcoholics Anonymous (AA).    A drug or alcohol rehabilitation program.    You can get information on alcohol resources and groups by calling the number 211 or 1-861.366.4127 on any phone.       Seek medical care if:  You have persistent vomiting.    You have persistent pain in any part of your body.    You do not feel better after a few days.    If you were given a prescription for medicine here today, be sure to read all of the information (including the package insert) that comes with your prescription.  This will include important information about the medicine, its side effects, and any warnings that you need to know about.  The pharmacist who fills the prescription can provide more information and answer questions you may have about the medicine.  If you have questions or concerns that the pharmacist cannot address, please call or return to the Emergency Department.   Remember that you can always come back to the Emergency Department if you are not able to see your regular doctor in the amount of time listed above, if you get any new symptoms, or if there is anything that worries you.

## 2021-09-19 LAB — LEVETIRACETAM SERPL-MCNC: <2 UG/ML
